# Patient Record
Sex: FEMALE | Race: WHITE | NOT HISPANIC OR LATINO | Employment: FULL TIME | URBAN - METROPOLITAN AREA
[De-identification: names, ages, dates, MRNs, and addresses within clinical notes are randomized per-mention and may not be internally consistent; named-entity substitution may affect disease eponyms.]

---

## 2017-02-03 ENCOUNTER — TRANSCRIBE ORDERS (OUTPATIENT)
Dept: LAB | Facility: CLINIC | Age: 27
End: 2017-02-03

## 2017-02-03 ENCOUNTER — APPOINTMENT (OUTPATIENT)
Dept: LAB | Facility: CLINIC | Age: 27
End: 2017-02-03
Payer: COMMERCIAL

## 2017-02-03 DIAGNOSIS — M79.7 RHEUMATISM, UNSPECIFIED AND FIBROSITIS: ICD-10-CM

## 2017-02-03 DIAGNOSIS — M79.0 RHEUMATISM, UNSPECIFIED AND FIBROSITIS: ICD-10-CM

## 2017-02-03 DIAGNOSIS — M06.4 UNSPECIFIED INFLAMMATORY POLYARTHROPATHY: ICD-10-CM

## 2017-02-03 DIAGNOSIS — R10.9 ABDOMINAL PAIN, UNSPECIFIED SITE: ICD-10-CM

## 2017-02-03 DIAGNOSIS — M54.5 LOW BACK PAIN, UNSPECIFIED BACK PAIN LATERALITY, UNSPECIFIED CHRONICITY, WITH SCIATICA PRESENCE UNSPECIFIED: Primary | ICD-10-CM

## 2017-02-03 DIAGNOSIS — M54.5 LOW BACK PAIN, UNSPECIFIED BACK PAIN LATERALITY, UNSPECIFIED CHRONICITY, WITH SCIATICA PRESENCE UNSPECIFIED: ICD-10-CM

## 2017-02-03 LAB
ALBUMIN SERPL BCP-MCNC: 4.5 G/DL (ref 3.5–5)
ALP SERPL-CCNC: 72 U/L (ref 46–116)
ALT SERPL W P-5'-P-CCNC: 44 U/L (ref 12–78)
ANION GAP SERPL CALCULATED.3IONS-SCNC: 8 MMOL/L (ref 4–13)
AST SERPL W P-5'-P-CCNC: 25 U/L (ref 5–45)
BASOPHILS # BLD AUTO: 0.01 THOUSANDS/ΜL (ref 0–0.1)
BASOPHILS NFR BLD AUTO: 0 % (ref 0–1)
BILIRUB SERPL-MCNC: 0.3 MG/DL (ref 0.2–1)
BUN SERPL-MCNC: 11 MG/DL (ref 5–25)
CALCIUM SERPL-MCNC: 9.4 MG/DL (ref 8.3–10.1)
CHLORIDE SERPL-SCNC: 102 MMOL/L (ref 100–108)
CO2 SERPL-SCNC: 27 MMOL/L (ref 21–32)
CREAT SERPL-MCNC: 0.6 MG/DL (ref 0.6–1.3)
CRP SERPL QL: 35.3 MG/L
EOSINOPHIL # BLD AUTO: 0.03 THOUSAND/ΜL (ref 0–0.61)
EOSINOPHIL NFR BLD AUTO: 1 % (ref 0–6)
ERYTHROCYTE [DISTWIDTH] IN BLOOD BY AUTOMATED COUNT: 13.3 % (ref 11.6–15.1)
ERYTHROCYTE [SEDIMENTATION RATE] IN BLOOD: 3 MM/HOUR (ref 0–20)
GFR SERPL CREATININE-BSD FRML MDRD: >60 ML/MIN/1.73SQ M
GLUCOSE SERPL-MCNC: 103 MG/DL (ref 65–140)
HCT VFR BLD AUTO: 43.3 % (ref 34.8–46.1)
HGB BLD-MCNC: 14.4 G/DL (ref 11.5–15.4)
LYMPHOCYTES # BLD AUTO: 1.43 THOUSANDS/ΜL (ref 0.6–4.47)
LYMPHOCYTES NFR BLD AUTO: 23 % (ref 14–44)
MCH RBC QN AUTO: 28.1 PG (ref 26.8–34.3)
MCHC RBC AUTO-ENTMCNC: 33.3 G/DL (ref 31.4–37.4)
MCV RBC AUTO: 84 FL (ref 82–98)
MONOCYTES # BLD AUTO: 0.59 THOUSAND/ΜL (ref 0.17–1.22)
MONOCYTES NFR BLD AUTO: 10 % (ref 4–12)
NEUTROPHILS # BLD AUTO: 4.08 THOUSANDS/ΜL (ref 1.85–7.62)
NEUTS SEG NFR BLD AUTO: 66 % (ref 43–75)
PLATELET # BLD AUTO: 237 THOUSANDS/UL (ref 149–390)
PMV BLD AUTO: 11.5 FL (ref 8.9–12.7)
POTASSIUM SERPL-SCNC: 3.7 MMOL/L (ref 3.5–5.3)
PROT SERPL-MCNC: 8 G/DL (ref 6.4–8.2)
RBC # BLD AUTO: 5.13 MILLION/UL (ref 3.81–5.12)
SODIUM SERPL-SCNC: 137 MMOL/L (ref 136–145)
WBC # BLD AUTO: 6.14 THOUSAND/UL (ref 4.31–10.16)

## 2017-02-03 PROCEDURE — 85652 RBC SED RATE AUTOMATED: CPT

## 2017-02-03 PROCEDURE — 36415 COLL VENOUS BLD VENIPUNCTURE: CPT

## 2017-02-03 PROCEDURE — 80053 COMPREHEN METABOLIC PANEL: CPT

## 2017-02-03 PROCEDURE — 86140 C-REACTIVE PROTEIN: CPT

## 2017-02-03 PROCEDURE — 85025 COMPLETE CBC W/AUTO DIFF WBC: CPT

## 2017-02-25 ENCOUNTER — APPOINTMENT (OUTPATIENT)
Dept: LAB | Facility: CLINIC | Age: 27
End: 2017-02-25
Payer: COMMERCIAL

## 2017-02-25 DIAGNOSIS — M06.4 UNSPECIFIED INFLAMMATORY POLYARTHROPATHY: ICD-10-CM

## 2017-02-25 DIAGNOSIS — R10.9 ABDOMINAL PAIN, UNSPECIFIED SITE: ICD-10-CM

## 2017-02-25 DIAGNOSIS — M79.0 RHEUMATISM, UNSPECIFIED AND FIBROSITIS: ICD-10-CM

## 2017-02-25 DIAGNOSIS — M79.7 RHEUMATISM, UNSPECIFIED AND FIBROSITIS: ICD-10-CM

## 2017-02-25 DIAGNOSIS — M54.5 LOW BACK PAIN, UNSPECIFIED BACK PAIN LATERALITY, UNSPECIFIED CHRONICITY, WITH SCIATICA PRESENCE UNSPECIFIED: ICD-10-CM

## 2017-02-25 LAB — CRP SERPL QL: <3 MG/L

## 2017-02-25 PROCEDURE — 86140 C-REACTIVE PROTEIN: CPT

## 2017-02-25 PROCEDURE — 36415 COLL VENOUS BLD VENIPUNCTURE: CPT

## 2017-03-06 ENCOUNTER — ALLSCRIPTS OFFICE VISIT (OUTPATIENT)
Dept: OTHER | Facility: OTHER | Age: 27
End: 2017-03-06

## 2017-03-09 ENCOUNTER — TRANSCRIBE ORDERS (OUTPATIENT)
Dept: ADMINISTRATIVE | Facility: HOSPITAL | Age: 27
End: 2017-03-09

## 2017-03-09 DIAGNOSIS — R20.2 PARESTHESIA: Primary | ICD-10-CM

## 2017-04-24 ENCOUNTER — ALLSCRIPTS OFFICE VISIT (OUTPATIENT)
Dept: OTHER | Facility: OTHER | Age: 27
End: 2017-04-24

## 2017-04-28 ENCOUNTER — ALLSCRIPTS OFFICE VISIT (OUTPATIENT)
Dept: OTHER | Facility: OTHER | Age: 27
End: 2017-04-28

## 2017-04-28 DIAGNOSIS — F32.1 MAJOR DEPRESSIVE DISORDER, SINGLE EPISODE, MODERATE (HCC): ICD-10-CM

## 2017-05-01 ENCOUNTER — GENERIC CONVERSION - ENCOUNTER (OUTPATIENT)
Dept: OTHER | Facility: OTHER | Age: 27
End: 2017-05-01

## 2017-05-03 ENCOUNTER — GENERIC CONVERSION - ENCOUNTER (OUTPATIENT)
Dept: OTHER | Facility: OTHER | Age: 27
End: 2017-05-03

## 2017-05-03 ENCOUNTER — TRANSCRIBE ORDERS (OUTPATIENT)
Dept: LAB | Facility: CLINIC | Age: 27
End: 2017-05-03

## 2017-05-03 ENCOUNTER — APPOINTMENT (OUTPATIENT)
Dept: LAB | Facility: CLINIC | Age: 27
End: 2017-05-03
Payer: COMMERCIAL

## 2017-05-03 ENCOUNTER — TRANSCRIBE ORDERS (OUTPATIENT)
Dept: ADMINISTRATIVE | Facility: HOSPITAL | Age: 27
End: 2017-05-03

## 2017-05-03 DIAGNOSIS — M79.0 RHEUMATISM, UNSPECIFIED AND FIBROSITIS: ICD-10-CM

## 2017-05-03 DIAGNOSIS — M89.8X1 PAIN OF RIGHT SCAPULA: Primary | ICD-10-CM

## 2017-05-03 DIAGNOSIS — M06.4 INFLAMMATORY POLYARTHROPATHY (HCC): Primary | ICD-10-CM

## 2017-05-03 DIAGNOSIS — F32.1 MAJOR DEPRESSIVE DISORDER, SINGLE EPISODE, MODERATE (HCC): ICD-10-CM

## 2017-05-03 DIAGNOSIS — M79.7 RHEUMATISM, UNSPECIFIED AND FIBROSITIS: ICD-10-CM

## 2017-05-03 DIAGNOSIS — M06.4 INFLAMMATORY POLYARTHROPATHY (HCC): ICD-10-CM

## 2017-05-03 DIAGNOSIS — R10.0 ACUTE ABDOMINAL PAIN SYNDROME: ICD-10-CM

## 2017-05-03 DIAGNOSIS — M54.5 LOW BACK PAIN, UNSPECIFIED BACK PAIN LATERALITY, UNSPECIFIED CHRONICITY, WITH SCIATICA PRESENCE UNSPECIFIED: ICD-10-CM

## 2017-05-03 LAB
ALBUMIN SERPL BCP-MCNC: 4 G/DL (ref 3.5–5)
ALP SERPL-CCNC: 61 U/L (ref 46–116)
ALT SERPL W P-5'-P-CCNC: 22 U/L (ref 12–78)
ANION GAP SERPL CALCULATED.3IONS-SCNC: 8 MMOL/L (ref 4–13)
AST SERPL W P-5'-P-CCNC: 15 U/L (ref 5–45)
BASOPHILS # BLD AUTO: 0.03 THOUSANDS/ΜL (ref 0–0.1)
BASOPHILS NFR BLD AUTO: 0 % (ref 0–1)
BILIRUB SERPL-MCNC: 0.3 MG/DL (ref 0.2–1)
BUN SERPL-MCNC: 10 MG/DL (ref 5–25)
CALCIUM SERPL-MCNC: 8.8 MG/DL (ref 8.3–10.1)
CHLORIDE SERPL-SCNC: 103 MMOL/L (ref 100–108)
CO2 SERPL-SCNC: 28 MMOL/L (ref 21–32)
CREAT SERPL-MCNC: 0.53 MG/DL (ref 0.6–1.3)
CRP SERPL QL: <3 MG/L
EOSINOPHIL # BLD AUTO: 0.04 THOUSAND/ΜL (ref 0–0.61)
EOSINOPHIL NFR BLD AUTO: 0 % (ref 0–6)
ERYTHROCYTE [DISTWIDTH] IN BLOOD BY AUTOMATED COUNT: 14.3 % (ref 11.6–15.1)
ERYTHROCYTE [SEDIMENTATION RATE] IN BLOOD: 1 MM/HOUR (ref 0–20)
GFR SERPL CREATININE-BSD FRML MDRD: >60 ML/MIN/1.73SQ M
GLUCOSE P FAST SERPL-MCNC: 90 MG/DL (ref 65–99)
HCT VFR BLD AUTO: 44.2 % (ref 34.8–46.1)
HGB BLD-MCNC: 14.6 G/DL (ref 11.5–15.4)
LYMPHOCYTES # BLD AUTO: 3.75 THOUSANDS/ΜL (ref 0.6–4.47)
LYMPHOCYTES NFR BLD AUTO: 28 % (ref 14–44)
MCH RBC QN AUTO: 28 PG (ref 26.8–34.3)
MCHC RBC AUTO-ENTMCNC: 33 G/DL (ref 31.4–37.4)
MCV RBC AUTO: 85 FL (ref 82–98)
MONOCYTES # BLD AUTO: 1.01 THOUSAND/ΜL (ref 0.17–1.22)
MONOCYTES NFR BLD AUTO: 8 % (ref 4–12)
NEUTROPHILS # BLD AUTO: 8.39 THOUSANDS/ΜL (ref 1.85–7.62)
NEUTS SEG NFR BLD AUTO: 64 % (ref 43–75)
PLATELET # BLD AUTO: 308 THOUSANDS/UL (ref 149–390)
PMV BLD AUTO: 11.6 FL (ref 8.9–12.7)
POTASSIUM SERPL-SCNC: 4.3 MMOL/L (ref 3.5–5.3)
PROT SERPL-MCNC: 7.3 G/DL (ref 6.4–8.2)
RBC # BLD AUTO: 5.22 MILLION/UL (ref 3.81–5.12)
SODIUM SERPL-SCNC: 139 MMOL/L (ref 136–145)
TSH SERPL DL<=0.05 MIU/L-ACNC: 1.17 UIU/ML (ref 0.36–3.74)
WBC # BLD AUTO: 13.22 THOUSAND/UL (ref 4.31–10.16)

## 2017-05-03 PROCEDURE — 36415 COLL VENOUS BLD VENIPUNCTURE: CPT

## 2017-05-03 PROCEDURE — 85652 RBC SED RATE AUTOMATED: CPT

## 2017-05-03 PROCEDURE — 80053 COMPREHEN METABOLIC PANEL: CPT

## 2017-05-03 PROCEDURE — 85025 COMPLETE CBC W/AUTO DIFF WBC: CPT

## 2017-05-03 PROCEDURE — 84443 ASSAY THYROID STIM HORMONE: CPT

## 2017-05-03 PROCEDURE — 86140 C-REACTIVE PROTEIN: CPT

## 2017-05-18 ENCOUNTER — HOSPITAL ENCOUNTER (OUTPATIENT)
Dept: NUCLEAR MEDICINE | Facility: HOSPITAL | Age: 27
Discharge: HOME | End: 2017-05-18
Attending: PHYSICAL MEDICINE & REHABILITATION
Payer: OTHER MISCELLANEOUS

## 2017-05-18 DIAGNOSIS — M89.8X1 PAIN OF RIGHT SCAPULA: ICD-10-CM

## 2017-05-18 PROCEDURE — A9503 TC99M MEDRONATE: HCPCS

## 2017-05-18 PROCEDURE — 78320 HB BONE IMAGING (3D): CPT

## 2017-05-23 ENCOUNTER — GENERIC CONVERSION - ENCOUNTER (OUTPATIENT)
Dept: OTHER | Facility: OTHER | Age: 27
End: 2017-05-23

## 2017-05-30 ENCOUNTER — ALLSCRIPTS OFFICE VISIT (OUTPATIENT)
Dept: OTHER | Facility: OTHER | Age: 27
End: 2017-05-30

## 2017-06-13 ENCOUNTER — ALLSCRIPTS OFFICE VISIT (OUTPATIENT)
Dept: OTHER | Facility: OTHER | Age: 27
End: 2017-06-13

## 2017-06-20 ENCOUNTER — GENERIC CONVERSION - ENCOUNTER (OUTPATIENT)
Dept: OTHER | Facility: OTHER | Age: 27
End: 2017-06-20

## 2017-06-20 ENCOUNTER — TRANSCRIBE ORDERS (OUTPATIENT)
Dept: ADMINISTRATIVE | Facility: HOSPITAL | Age: 27
End: 2017-06-20

## 2017-06-20 DIAGNOSIS — M06.4 INFLAMMATORY POLYARTHRITIS (HCC): Primary | ICD-10-CM

## 2017-06-21 ENCOUNTER — HOSPITAL ENCOUNTER (OUTPATIENT)
Dept: RADIOLOGY | Facility: HOSPITAL | Age: 27
Discharge: HOME | End: 2017-06-21
Payer: COMMERCIAL

## 2017-06-21 ENCOUNTER — TRANSCRIBE ORDERS (OUTPATIENT)
Dept: ADMINISTRATIVE | Facility: HOSPITAL | Age: 27
End: 2017-06-21

## 2017-06-21 DIAGNOSIS — M79.7 FIBROMYALGIA: ICD-10-CM

## 2017-06-21 DIAGNOSIS — M06.4 INFLAMMATORY POLYARTHROPATHY (HCC): Primary | ICD-10-CM

## 2017-06-21 PROCEDURE — 73130 X-RAY EXAM OF HAND: CPT

## 2017-06-21 PROCEDURE — 73110 X-RAY EXAM OF WRIST: CPT

## 2017-06-21 PROCEDURE — 73630 X-RAY EXAM OF FOOT: CPT

## 2017-06-23 ENCOUNTER — HOSPITAL ENCOUNTER (OUTPATIENT)
Dept: ULTRASOUND IMAGING | Facility: HOSPITAL | Age: 27
Discharge: HOME | End: 2017-06-23
Payer: COMMERCIAL

## 2017-06-23 DIAGNOSIS — M06.4 INFLAMMATORY POLYARTHRITIS (HCC): ICD-10-CM

## 2017-06-23 PROCEDURE — 76536 US EXAM OF HEAD AND NECK: CPT

## 2017-07-05 ENCOUNTER — APPOINTMENT (OUTPATIENT)
Dept: LAB | Facility: CLINIC | Age: 27
End: 2017-07-05
Payer: COMMERCIAL

## 2017-07-05 DIAGNOSIS — M06.4 INFLAMMATORY POLYARTHROPATHY (HCC): ICD-10-CM

## 2017-07-05 LAB
BASOPHILS # BLD AUTO: 0.02 THOUSANDS/ΜL (ref 0–0.1)
BASOPHILS NFR BLD AUTO: 0 % (ref 0–1)
C3 SERPL-MCNC: 100 MG/DL (ref 90–180)
C4 SERPL-MCNC: 25 MG/DL (ref 10–40)
CRP SERPL QL: 3.5 MG/L
EOSINOPHIL # BLD AUTO: 0.07 THOUSAND/ΜL (ref 0–0.61)
EOSINOPHIL NFR BLD AUTO: 1 % (ref 0–6)
ERYTHROCYTE [DISTWIDTH] IN BLOOD BY AUTOMATED COUNT: 13 % (ref 11.6–15.1)
ERYTHROCYTE [SEDIMENTATION RATE] IN BLOOD: 7 MM/HOUR (ref 0–20)
HCT VFR BLD AUTO: 38.5 % (ref 34.8–46.1)
HGB BLD-MCNC: 12.9 G/DL (ref 11.5–15.4)
LYMPHOCYTES # BLD AUTO: 2.7 THOUSANDS/ΜL (ref 0.6–4.47)
LYMPHOCYTES NFR BLD AUTO: 31 % (ref 14–44)
MCH RBC QN AUTO: 28.4 PG (ref 26.8–34.3)
MCHC RBC AUTO-ENTMCNC: 33.5 G/DL (ref 31.4–37.4)
MCV RBC AUTO: 85 FL (ref 82–98)
MONOCYTES # BLD AUTO: 0.51 THOUSAND/ΜL (ref 0.17–1.22)
MONOCYTES NFR BLD AUTO: 6 % (ref 4–12)
NEUTROPHILS # BLD AUTO: 5.45 THOUSANDS/ΜL (ref 1.85–7.62)
NEUTS SEG NFR BLD AUTO: 62 % (ref 43–75)
PLATELET # BLD AUTO: 232 THOUSANDS/UL (ref 149–390)
PMV BLD AUTO: 11.6 FL (ref 8.9–12.7)
RBC # BLD AUTO: 4.54 MILLION/UL (ref 3.81–5.12)
T4 FREE SERPL-MCNC: 0.81 NG/DL (ref 0.76–1.46)
TSH SERPL DL<=0.05 MIU/L-ACNC: 1.34 UIU/ML (ref 0.36–3.74)
WBC # BLD AUTO: 8.75 THOUSAND/UL (ref 4.31–10.16)

## 2017-07-05 PROCEDURE — 86200 CCP ANTIBODY: CPT

## 2017-07-05 PROCEDURE — 84443 ASSAY THYROID STIM HORMONE: CPT

## 2017-07-05 PROCEDURE — 86225 DNA ANTIBODY NATIVE: CPT

## 2017-07-05 PROCEDURE — 80074 ACUTE HEPATITIS PANEL: CPT

## 2017-07-05 PROCEDURE — 86235 NUCLEAR ANTIGEN ANTIBODY: CPT

## 2017-07-05 PROCEDURE — 85025 COMPLETE CBC W/AUTO DIFF WBC: CPT

## 2017-07-05 PROCEDURE — 36415 COLL VENOUS BLD VENIPUNCTURE: CPT

## 2017-07-05 PROCEDURE — 86038 ANTINUCLEAR ANTIBODIES: CPT

## 2017-07-05 PROCEDURE — 86376 MICROSOMAL ANTIBODY EACH: CPT

## 2017-07-05 PROCEDURE — 81374 HLA I TYPING 1 ANTIGEN LR: CPT

## 2017-07-05 PROCEDURE — 86430 RHEUMATOID FACTOR TEST QUAL: CPT

## 2017-07-05 PROCEDURE — 86160 COMPLEMENT ANTIGEN: CPT

## 2017-07-05 PROCEDURE — 85652 RBC SED RATE AUTOMATED: CPT

## 2017-07-05 PROCEDURE — 86140 C-REACTIVE PROTEIN: CPT

## 2017-07-05 PROCEDURE — 86800 THYROGLOBULIN ANTIBODY: CPT

## 2017-07-05 PROCEDURE — 84439 ASSAY OF FREE THYROXINE: CPT

## 2017-07-05 PROCEDURE — 82164 ANGIOTENSIN I ENZYME TEST: CPT

## 2017-07-06 LAB
ACE SERPL-CCNC: 34 U/L (ref 14–82)
DSDNA AB SER-ACNC: <1 IU/ML (ref 0–9)
ENA RNP AB SER-ACNC: <0.2 AI (ref 0–0.9)
ENA SM AB SER-ACNC: <0.2 AI (ref 0–0.9)
ENA SS-A AB SER-ACNC: <0.2 AI (ref 0–0.9)
ENA SS-B AB SER-ACNC: <0.2 AI (ref 0–0.9)
HAV IGM SER QL: NORMAL
HBV CORE IGM SER QL: NORMAL
HBV SURFACE AG SER QL: NORMAL
HCV AB SER QL: NORMAL
RHEUMATOID FACT SER QL LA: NEGATIVE
THYROGLOB AB SERPL-ACNC: <1 IU/ML (ref 0–0.9)
THYROPEROXIDASE AB SERPL-ACNC: 14 IU/ML (ref 0–34)

## 2017-07-07 LAB
CCP IGA+IGG SERPL IA-ACNC: 9 UNITS (ref 0–19)
RYE IGE QN: NEGATIVE

## 2017-07-11 LAB — HLA-B27 QL NAA+PROBE: NEGATIVE

## 2017-07-20 ENCOUNTER — ALLSCRIPTS OFFICE VISIT (OUTPATIENT)
Dept: OTHER | Facility: OTHER | Age: 27
End: 2017-07-20

## 2017-07-20 DIAGNOSIS — N39.3 STRESS INCONTINENCE: ICD-10-CM

## 2017-07-20 DIAGNOSIS — M54.12 RADICULOPATHY OF CERVICAL REGION: ICD-10-CM

## 2017-07-20 DIAGNOSIS — N83.201 CYST OF RIGHT OVARY: ICD-10-CM

## 2017-07-20 DIAGNOSIS — R10.9 ABDOMINAL PAIN: ICD-10-CM

## 2017-07-20 DIAGNOSIS — R20.2 PARESTHESIA OF SKIN: ICD-10-CM

## 2017-07-24 ENCOUNTER — ALLSCRIPTS OFFICE VISIT (OUTPATIENT)
Dept: OTHER | Facility: OTHER | Age: 27
End: 2017-07-24

## 2017-07-24 ENCOUNTER — TRANSCRIBE ORDERS (OUTPATIENT)
Dept: ADMINISTRATIVE | Facility: HOSPITAL | Age: 27
End: 2017-07-24

## 2017-07-24 DIAGNOSIS — M54.12 BRACHIAL NEURITIS OR RADICULITIS NOS: Primary | ICD-10-CM

## 2017-07-31 ENCOUNTER — TRANSCRIBE ORDERS (OUTPATIENT)
Dept: LAB | Facility: CLINIC | Age: 27
End: 2017-07-31

## 2017-07-31 ENCOUNTER — APPOINTMENT (OUTPATIENT)
Dept: LAB | Facility: CLINIC | Age: 27
End: 2017-07-31
Payer: COMMERCIAL

## 2017-07-31 DIAGNOSIS — M06.4 INFLAMMATORY POLYARTHROPATHY (HCC): ICD-10-CM

## 2017-07-31 DIAGNOSIS — M06.4 INFLAMMATORY POLYARTHROPATHY (HCC): Primary | ICD-10-CM

## 2017-07-31 LAB
CRP SERPL QL: 3.8 MG/L
ERYTHROCYTE [SEDIMENTATION RATE] IN BLOOD: 3 MM/HOUR (ref 0–20)

## 2017-07-31 PROCEDURE — 85652 RBC SED RATE AUTOMATED: CPT

## 2017-07-31 PROCEDURE — 84165 PROTEIN E-PHORESIS SERUM: CPT

## 2017-07-31 PROCEDURE — 86140 C-REACTIVE PROTEIN: CPT

## 2017-07-31 PROCEDURE — 36415 COLL VENOUS BLD VENIPUNCTURE: CPT

## 2017-08-03 LAB
ALBUMIN SERPL ELPH-MCNC: 4.15 G/DL (ref 3.5–5)
ALBUMIN SERPL ELPH-MCNC: 63.8 % (ref 52–65)
ALPHA1 GLOB SERPL ELPH-MCNC: 0.29 G/DL (ref 0.1–0.4)
ALPHA1 GLOB SERPL ELPH-MCNC: 4.5 % (ref 2.5–5)
ALPHA2 GLOB SERPL ELPH-MCNC: 0.57 G/DL (ref 0.4–1.2)
ALPHA2 GLOB SERPL ELPH-MCNC: 8.8 % (ref 7–13)
BETA GLOB ABNORMAL SERPL ELPH-MCNC: 0.47 G/DL (ref 0.4–0.8)
BETA1 GLOB SERPL ELPH-MCNC: 7.3 % (ref 5–13)
BETA2 GLOB SERPL ELPH-MCNC: 4.9 % (ref 2–8)
BETA2+GAMMA GLOB SERPL ELPH-MCNC: 0.32 G/DL (ref 0.2–0.5)
GAMMA GLOB ABNORMAL SERPL ELPH-MCNC: 0.7 G/DL (ref 0.5–1.6)
GAMMA GLOB SERPL ELPH-MCNC: 10.7 % (ref 12–22)
IGG/ALB SER: 1.76 {RATIO} (ref 1.1–1.8)
PROT PATTERN SERPL ELPH-IMP: ABNORMAL
PROT SERPL-MCNC: 6.5 G/DL (ref 6.4–8.2)

## 2017-08-09 ENCOUNTER — HOSPITAL ENCOUNTER (OUTPATIENT)
Dept: MRI IMAGING | Facility: HOSPITAL | Age: 27
Discharge: HOME | End: 2017-08-09
Attending: PSYCHIATRY & NEUROLOGY
Payer: OTHER MISCELLANEOUS

## 2017-08-09 DIAGNOSIS — M54.12 BRACHIAL NEURITIS OR RADICULITIS NOS: ICD-10-CM

## 2017-08-09 PROCEDURE — 72141 MRI NECK SPINE W/O DYE: CPT

## 2017-08-10 ENCOUNTER — HOSPITAL ENCOUNTER (EMERGENCY)
Facility: HOSPITAL | Age: 27
Discharge: HOME | End: 2017-08-11
Attending: EMERGENCY MEDICINE | Admitting: EMERGENCY MEDICINE
Payer: COMMERCIAL

## 2017-08-10 ENCOUNTER — APPOINTMENT (OUTPATIENT)
Dept: LAB | Facility: CLINIC | Age: 27
End: 2017-08-10
Payer: COMMERCIAL

## 2017-08-10 ENCOUNTER — ALLSCRIPTS OFFICE VISIT (OUTPATIENT)
Dept: OTHER | Facility: OTHER | Age: 27
End: 2017-08-10

## 2017-08-10 ENCOUNTER — TRANSCRIBE ORDERS (OUTPATIENT)
Dept: ADMINISTRATIVE | Facility: HOSPITAL | Age: 27
End: 2017-08-10

## 2017-08-10 DIAGNOSIS — R10.9 ABDOMINAL PAIN: ICD-10-CM

## 2017-08-10 DIAGNOSIS — N83.209 RUPTURED OVARIAN CYST: Primary | ICD-10-CM

## 2017-08-10 DIAGNOSIS — R10.31 RIGHT LOWER QUADRANT PAIN: Primary | ICD-10-CM

## 2017-08-10 DIAGNOSIS — R19.7 ACUTE DIARRHEA: ICD-10-CM

## 2017-08-10 LAB
ALBUMIN SERPL BCP-MCNC: 3.7 G/DL (ref 3.5–5)
ALP SERPL-CCNC: 60 U/L (ref 46–116)
ALT SERPL W P-5'-P-CCNC: 31 U/L (ref 12–78)
ANION GAP SERPL CALCULATED.3IONS-SCNC: 7 MMOL/L (ref 4–13)
AST SERPL W P-5'-P-CCNC: 20 U/L (ref 5–45)
BACTERIA UR QL AUTO: ABNORMAL /HPF
BASOPHILS # BLD AUTO: 0.02 THOUSANDS/ΜL (ref 0–0.1)
BASOPHILS NFR BLD AUTO: 0 % (ref 0–1)
BILIRUB SERPL-MCNC: 0.3 MG/DL (ref 0.2–1)
BILIRUB UR QL STRIP: NEGATIVE
BUN SERPL-MCNC: 12 MG/DL (ref 5–25)
CALCIUM SERPL-MCNC: 8.6 MG/DL (ref 8.3–10.1)
CHLORIDE SERPL-SCNC: 105 MMOL/L (ref 100–108)
CLARITY UR: CLEAR
CO2 SERPL-SCNC: 28 MMOL/L (ref 21–32)
COLOR UR: YELLOW
CREAT SERPL-MCNC: 0.57 MG/DL (ref 0.6–1.3)
EOSINOPHIL # BLD AUTO: 0.06 THOUSAND/ΜL (ref 0–0.61)
EOSINOPHIL NFR BLD AUTO: 1 % (ref 0–6)
ERYTHROCYTE [DISTWIDTH] IN BLOOD BY AUTOMATED COUNT: 12.9 % (ref 11.6–15.1)
GFR SERPL CREATININE-BSD FRML MDRD: 128 ML/MIN/1.73SQ M
GLUCOSE SERPL-MCNC: 96 MG/DL (ref 65–140)
GLUCOSE UR STRIP-MCNC: NEGATIVE MG/DL
HCG UR QL: NEGATIVE
HCT VFR BLD AUTO: 35.9 % (ref 34.8–46.1)
HGB BLD-MCNC: 11.8 G/DL (ref 11.5–15.4)
HGB UR QL STRIP.AUTO: ABNORMAL
KETONES UR STRIP-MCNC: NEGATIVE MG/DL
LEUKOCYTE ESTERASE UR QL STRIP: NEGATIVE
LYMPHOCYTES # BLD AUTO: 1.57 THOUSANDS/ΜL (ref 0.6–4.47)
LYMPHOCYTES NFR BLD AUTO: 22 % (ref 14–44)
MCH RBC QN AUTO: 28 PG (ref 26.8–34.3)
MCHC RBC AUTO-ENTMCNC: 32.9 G/DL (ref 31.4–37.4)
MCV RBC AUTO: 85 FL (ref 82–98)
MONOCYTES # BLD AUTO: 0.64 THOUSAND/ΜL (ref 0.17–1.22)
MONOCYTES NFR BLD AUTO: 9 % (ref 4–12)
NEUTROPHILS # BLD AUTO: 4.99 THOUSANDS/ΜL (ref 1.85–7.62)
NEUTS SEG NFR BLD AUTO: 68 % (ref 43–75)
NITRITE UR QL STRIP: NEGATIVE
NON-SQ EPI CELLS URNS QL MICRO: ABNORMAL /HPF
PH UR STRIP.AUTO: 5.5 [PH] (ref 4.5–8)
PLATELET # BLD AUTO: 215 THOUSANDS/UL (ref 149–390)
PMV BLD AUTO: 11.2 FL (ref 8.9–12.7)
POTASSIUM SERPL-SCNC: 4 MMOL/L (ref 3.5–5.3)
PROT SERPL-MCNC: 6.9 G/DL (ref 6.4–8.2)
PROT UR STRIP-MCNC: NEGATIVE MG/DL
RBC # BLD AUTO: 4.21 MILLION/UL (ref 3.81–5.12)
RBC #/AREA URNS AUTO: ABNORMAL /HPF
SODIUM SERPL-SCNC: 140 MMOL/L (ref 136–145)
SP GR UR STRIP.AUTO: 1.01 (ref 1–1.03)
UROBILINOGEN UR QL STRIP.AUTO: 0.2 E.U./DL
WBC # BLD AUTO: 7.28 THOUSAND/UL (ref 4.31–10.16)
WBC #/AREA URNS AUTO: ABNORMAL /HPF

## 2017-08-10 PROCEDURE — 80053 COMPREHEN METABOLIC PANEL: CPT

## 2017-08-10 PROCEDURE — 96375 TX/PRO/DX INJ NEW DRUG ADDON: CPT

## 2017-08-10 PROCEDURE — 85025 COMPLETE CBC W/AUTO DIFF WBC: CPT

## 2017-08-10 PROCEDURE — 81001 URINALYSIS AUTO W/SCOPE: CPT

## 2017-08-10 PROCEDURE — 36415 COLL VENOUS BLD VENIPUNCTURE: CPT

## 2017-08-10 PROCEDURE — 96374 THER/PROPH/DIAG INJ IV PUSH: CPT

## 2017-08-10 PROCEDURE — 96361 HYDRATE IV INFUSION ADD-ON: CPT

## 2017-08-10 PROCEDURE — 81025 URINE PREGNANCY TEST: CPT | Performed by: EMERGENCY MEDICINE

## 2017-08-10 RX ORDER — PREGABALIN 150 MG/1
50 CAPSULE ORAL 2 TIMES DAILY
COMMUNITY
End: 2018-06-29 | Stop reason: SDUPTHER

## 2017-08-10 RX ORDER — ONDANSETRON 2 MG/ML
4 INJECTION INTRAMUSCULAR; INTRAVENOUS ONCE
Status: COMPLETED | OUTPATIENT
Start: 2017-08-10 | End: 2017-08-10

## 2017-08-10 RX ORDER — BUPROPION HYDROCHLORIDE 150 MG/1
150 TABLET ORAL DAILY
COMMUNITY
End: 2018-02-12

## 2017-08-10 RX ORDER — MORPHINE SULFATE 4 MG/ML
4 INJECTION, SOLUTION INTRAMUSCULAR; INTRAVENOUS ONCE
Status: COMPLETED | OUTPATIENT
Start: 2017-08-10 | End: 2017-08-10

## 2017-08-10 RX ORDER — ERGOCALCIFEROL 1.25 MG/1
50000 CAPSULE ORAL WEEKLY
COMMUNITY
End: 2019-03-04

## 2017-08-10 RX ADMIN — ONDANSETRON 4 MG: 2 INJECTION INTRAMUSCULAR; INTRAVENOUS at 23:56

## 2017-08-10 RX ADMIN — SODIUM CHLORIDE 1000 ML: 0.9 INJECTION, SOLUTION INTRAVENOUS at 23:56

## 2017-08-10 RX ADMIN — MORPHINE SULFATE 4 MG: 4 INJECTION, SOLUTION INTRAMUSCULAR; INTRAVENOUS at 23:59

## 2017-08-11 ENCOUNTER — APPOINTMENT (EMERGENCY)
Dept: CT IMAGING | Facility: HOSPITAL | Age: 27
End: 2017-08-11
Payer: COMMERCIAL

## 2017-08-11 VITALS
SYSTOLIC BLOOD PRESSURE: 110 MMHG | OXYGEN SATURATION: 99 % | HEART RATE: 76 BPM | WEIGHT: 185 LBS | TEMPERATURE: 97.9 F | BODY MASS INDEX: 31.58 KG/M2 | RESPIRATION RATE: 16 BRPM | DIASTOLIC BLOOD PRESSURE: 78 MMHG | HEIGHT: 64 IN

## 2017-08-11 PROCEDURE — 96361 HYDRATE IV INFUSION ADD-ON: CPT

## 2017-08-11 PROCEDURE — 96375 TX/PRO/DX INJ NEW DRUG ADDON: CPT

## 2017-08-11 PROCEDURE — 74177 CT ABD & PELVIS W/CONTRAST: CPT

## 2017-08-11 PROCEDURE — 99284 EMERGENCY DEPT VISIT MOD MDM: CPT

## 2017-08-11 RX ORDER — KETOROLAC TROMETHAMINE 30 MG/ML
15 INJECTION, SOLUTION INTRAMUSCULAR; INTRAVENOUS ONCE
Status: COMPLETED | OUTPATIENT
Start: 2017-08-11 | End: 2017-08-11

## 2017-08-11 RX ORDER — IBUPROFEN 800 MG/1
800 TABLET ORAL EVERY 8 HOURS PRN
Qty: 21 TABLET | Refills: 0 | Status: SHIPPED | OUTPATIENT
Start: 2017-08-11 | End: 2018-03-14

## 2017-08-11 RX ORDER — HYDROCODONE BITARTRATE AND ACETAMINOPHEN 5; 325 MG/1; MG/1
1 TABLET ORAL EVERY 6 HOURS PRN
Qty: 15 TABLET | Refills: 0 | Status: SHIPPED | OUTPATIENT
Start: 2017-08-11 | End: 2017-08-21

## 2017-08-11 RX ADMIN — KETOROLAC TROMETHAMINE 15 MG: 30 INJECTION, SOLUTION INTRAMUSCULAR at 01:34

## 2017-08-11 RX ADMIN — IOHEXOL 100 ML: 350 INJECTION, SOLUTION INTRAVENOUS at 00:19

## 2017-08-14 ENCOUNTER — ALLSCRIPTS OFFICE VISIT (OUTPATIENT)
Dept: OTHER | Facility: OTHER | Age: 27
End: 2017-08-14

## 2017-08-17 ENCOUNTER — GENERIC CONVERSION - ENCOUNTER (OUTPATIENT)
Dept: OTHER | Facility: OTHER | Age: 27
End: 2017-08-17

## 2017-08-17 ENCOUNTER — HOSPITAL ENCOUNTER (OUTPATIENT)
Dept: NEUROLOGY | Facility: CLINIC | Age: 27
Discharge: HOME | End: 2017-08-17
Payer: OTHER MISCELLANEOUS

## 2017-08-17 DIAGNOSIS — R20.2 PARESTHESIAS: ICD-10-CM

## 2017-08-17 DIAGNOSIS — M54.12 BRACHIAL NEURITIS OR RADICULITIS NOS: ICD-10-CM

## 2017-08-17 PROCEDURE — 95886 MUSC TEST DONE W/N TEST COMP: CPT

## 2017-08-17 PROCEDURE — 95911 NRV CNDJ TEST 9-10 STUDIES: CPT

## 2017-08-18 ENCOUNTER — HOSPITAL ENCOUNTER (OUTPATIENT)
Dept: ULTRASOUND IMAGING | Facility: HOSPITAL | Age: 27
Discharge: HOME | End: 2017-08-18
Attending: OBSTETRICS & GYNECOLOGY
Payer: COMMERCIAL

## 2017-08-18 DIAGNOSIS — N83.201 CYST OF RIGHT OVARY: ICD-10-CM

## 2017-08-18 PROCEDURE — 76856 US EXAM PELVIC COMPLETE: CPT

## 2017-08-18 PROCEDURE — 76830 TRANSVAGINAL US NON-OB: CPT

## 2017-08-22 ENCOUNTER — APPOINTMENT (OUTPATIENT)
Dept: PHYSICAL THERAPY | Facility: CLINIC | Age: 27
End: 2017-08-22
Payer: OTHER MISCELLANEOUS

## 2017-08-22 DIAGNOSIS — R20.2 PARESTHESIA OF SKIN: ICD-10-CM

## 2017-08-22 DIAGNOSIS — M54.12 RADICULOPATHY OF CERVICAL REGION: ICD-10-CM

## 2017-08-22 PROCEDURE — 97110 THERAPEUTIC EXERCISES: CPT

## 2017-08-22 PROCEDURE — 97162 PT EVAL MOD COMPLEX 30 MIN: CPT

## 2017-08-23 ENCOUNTER — GENERIC CONVERSION - ENCOUNTER (OUTPATIENT)
Dept: OTHER | Facility: OTHER | Age: 27
End: 2017-08-23

## 2017-08-24 ENCOUNTER — ALLSCRIPTS OFFICE VISIT (OUTPATIENT)
Dept: OTHER | Facility: OTHER | Age: 27
End: 2017-08-24

## 2017-08-29 ENCOUNTER — APPOINTMENT (OUTPATIENT)
Dept: PHYSICAL THERAPY | Facility: CLINIC | Age: 27
End: 2017-08-29
Payer: OTHER MISCELLANEOUS

## 2017-08-29 PROCEDURE — 97010 HOT OR COLD PACKS THERAPY: CPT

## 2017-08-29 PROCEDURE — 97110 THERAPEUTIC EXERCISES: CPT

## 2017-08-30 ENCOUNTER — GENERIC CONVERSION - ENCOUNTER (OUTPATIENT)
Dept: OTHER | Facility: OTHER | Age: 27
End: 2017-08-30

## 2017-09-01 DIAGNOSIS — R31.29 OTHER MICROSCOPIC HEMATURIA: ICD-10-CM

## 2017-09-12 ENCOUNTER — APPOINTMENT (OUTPATIENT)
Dept: PHYSICAL THERAPY | Facility: CLINIC | Age: 27
End: 2017-09-12
Payer: OTHER MISCELLANEOUS

## 2017-09-12 PROCEDURE — 97110 THERAPEUTIC EXERCISES: CPT

## 2017-09-12 PROCEDURE — 97010 HOT OR COLD PACKS THERAPY: CPT

## 2017-09-19 ENCOUNTER — APPOINTMENT (OUTPATIENT)
Dept: PHYSICAL THERAPY | Facility: CLINIC | Age: 27
End: 2017-09-19
Payer: OTHER MISCELLANEOUS

## 2017-09-19 PROCEDURE — 97010 HOT OR COLD PACKS THERAPY: CPT

## 2017-09-19 PROCEDURE — G8991 OTHER PT/OT GOAL STATUS: HCPCS

## 2017-09-19 PROCEDURE — 97014 ELECTRIC STIMULATION THERAPY: CPT

## 2017-09-19 PROCEDURE — 97110 THERAPEUTIC EXERCISES: CPT

## 2017-09-19 PROCEDURE — G8990 OTHER PT/OT CURRENT STATUS: HCPCS

## 2017-09-20 ENCOUNTER — GENERIC CONVERSION - ENCOUNTER (OUTPATIENT)
Dept: OTHER | Facility: OTHER | Age: 27
End: 2017-09-20

## 2017-09-21 ENCOUNTER — APPOINTMENT (OUTPATIENT)
Dept: PHYSICAL THERAPY | Facility: CLINIC | Age: 27
End: 2017-09-21
Payer: OTHER MISCELLANEOUS

## 2017-09-25 ENCOUNTER — ALLSCRIPTS OFFICE VISIT (OUTPATIENT)
Dept: OTHER | Facility: OTHER | Age: 27
End: 2017-09-25

## 2017-09-26 ENCOUNTER — GENERIC CONVERSION - ENCOUNTER (OUTPATIENT)
Dept: OTHER | Facility: OTHER | Age: 27
End: 2017-09-26

## 2017-09-26 ENCOUNTER — APPOINTMENT (OUTPATIENT)
Dept: PHYSICAL THERAPY | Facility: CLINIC | Age: 27
End: 2017-09-26
Payer: OTHER MISCELLANEOUS

## 2017-09-26 PROCEDURE — 97014 ELECTRIC STIMULATION THERAPY: CPT

## 2017-09-26 PROCEDURE — 97010 HOT OR COLD PACKS THERAPY: CPT

## 2017-09-26 PROCEDURE — 97110 THERAPEUTIC EXERCISES: CPT

## 2017-09-28 ENCOUNTER — APPOINTMENT (OUTPATIENT)
Dept: PHYSICAL THERAPY | Facility: CLINIC | Age: 27
End: 2017-09-28
Payer: OTHER MISCELLANEOUS

## 2017-09-28 PROCEDURE — 97110 THERAPEUTIC EXERCISES: CPT

## 2017-09-29 ENCOUNTER — ALLSCRIPTS OFFICE VISIT (OUTPATIENT)
Dept: OTHER | Facility: OTHER | Age: 27
End: 2017-09-29

## 2017-10-01 ENCOUNTER — GENERIC CONVERSION - ENCOUNTER (OUTPATIENT)
Dept: OTHER | Facility: OTHER | Age: 27
End: 2017-10-01

## 2017-10-02 ENCOUNTER — APPOINTMENT (OUTPATIENT)
Dept: PHYSICAL THERAPY | Facility: CLINIC | Age: 27
End: 2017-10-02
Payer: OTHER MISCELLANEOUS

## 2017-10-02 PROCEDURE — 97110 THERAPEUTIC EXERCISES: CPT

## 2017-10-03 ENCOUNTER — APPOINTMENT (OUTPATIENT)
Dept: PHYSICAL THERAPY | Facility: CLINIC | Age: 27
End: 2017-10-03
Payer: OTHER MISCELLANEOUS

## 2017-10-03 PROCEDURE — 97110 THERAPEUTIC EXERCISES: CPT

## 2017-10-03 PROCEDURE — 97010 HOT OR COLD PACKS THERAPY: CPT

## 2017-10-09 ENCOUNTER — ALLSCRIPTS OFFICE VISIT (OUTPATIENT)
Dept: OTHER | Facility: OTHER | Age: 27
End: 2017-10-09

## 2017-10-09 ENCOUNTER — APPOINTMENT (OUTPATIENT)
Dept: PHYSICAL THERAPY | Facility: CLINIC | Age: 27
End: 2017-10-09
Payer: OTHER MISCELLANEOUS

## 2017-10-09 ENCOUNTER — GENERIC CONVERSION - ENCOUNTER (OUTPATIENT)
Dept: OTHER | Facility: OTHER | Age: 27
End: 2017-10-09

## 2017-10-09 PROCEDURE — 97110 THERAPEUTIC EXERCISES: CPT

## 2017-10-10 ENCOUNTER — APPOINTMENT (OUTPATIENT)
Dept: PHYSICAL THERAPY | Facility: CLINIC | Age: 27
End: 2017-10-10
Payer: OTHER MISCELLANEOUS

## 2017-10-10 ENCOUNTER — GENERIC CONVERSION - ENCOUNTER (OUTPATIENT)
Dept: OTHER | Facility: OTHER | Age: 27
End: 2017-10-10

## 2017-10-10 PROCEDURE — 97110 THERAPEUTIC EXERCISES: CPT

## 2017-10-16 ENCOUNTER — APPOINTMENT (OUTPATIENT)
Dept: PHYSICAL THERAPY | Facility: CLINIC | Age: 27
End: 2017-10-16
Payer: OTHER MISCELLANEOUS

## 2017-10-16 PROCEDURE — 97110 THERAPEUTIC EXERCISES: CPT

## 2017-10-17 ENCOUNTER — APPOINTMENT (OUTPATIENT)
Dept: PHYSICAL THERAPY | Facility: CLINIC | Age: 27
End: 2017-10-17
Payer: OTHER MISCELLANEOUS

## 2017-10-17 PROCEDURE — 97110 THERAPEUTIC EXERCISES: CPT

## 2017-10-17 PROCEDURE — G8992 OTHER PT/OT  D/C STATUS: HCPCS

## 2017-10-17 PROCEDURE — G8991 OTHER PT/OT GOAL STATUS: HCPCS

## 2017-10-18 ENCOUNTER — GENERIC CONVERSION - ENCOUNTER (OUTPATIENT)
Dept: OTHER | Facility: OTHER | Age: 27
End: 2017-10-18

## 2017-10-23 ENCOUNTER — ALLSCRIPTS OFFICE VISIT (OUTPATIENT)
Dept: OTHER | Facility: OTHER | Age: 27
End: 2017-10-23

## 2017-10-24 ENCOUNTER — GENERIC CONVERSION - ENCOUNTER (OUTPATIENT)
Dept: OTHER | Facility: OTHER | Age: 27
End: 2017-10-24

## 2017-10-24 ENCOUNTER — APPOINTMENT (OUTPATIENT)
Dept: PHYSICAL THERAPY | Facility: CLINIC | Age: 27
End: 2017-10-24
Payer: OTHER MISCELLANEOUS

## 2017-10-26 ENCOUNTER — GENERIC CONVERSION - ENCOUNTER (OUTPATIENT)
Dept: OTHER | Facility: OTHER | Age: 27
End: 2017-10-26
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
## 2017-10-30 ENCOUNTER — APPOINTMENT (OUTPATIENT)
Dept: PHYSICAL THERAPY | Facility: CLINIC | Age: 27
End: 2017-10-30
Payer: OTHER MISCELLANEOUS

## 2017-10-31 ENCOUNTER — APPOINTMENT (OUTPATIENT)
Dept: PHYSICAL THERAPY | Facility: CLINIC | Age: 27
End: 2017-10-31
Payer: OTHER MISCELLANEOUS

## 2017-11-03 ENCOUNTER — GENERIC CONVERSION - ENCOUNTER (OUTPATIENT)
Dept: OTHER | Facility: OTHER | Age: 27
End: 2017-11-03

## 2017-11-06 ENCOUNTER — ALLSCRIPTS OFFICE VISIT (OUTPATIENT)
Dept: OTHER | Facility: OTHER | Age: 27
End: 2017-11-06

## 2017-11-20 ENCOUNTER — ALLSCRIPTS OFFICE VISIT (OUTPATIENT)
Dept: OTHER | Facility: OTHER | Age: 27
End: 2017-11-20

## 2017-11-21 ENCOUNTER — ALLSCRIPTS OFFICE VISIT (OUTPATIENT)
Dept: OTHER | Facility: OTHER | Age: 27
End: 2017-11-21

## 2017-11-22 NOTE — PROGRESS NOTES
Assessment    1  Sinus headache (784 0) (R51)   2  Feeling tired (780 79) (R53 83)   3  Moderate depressive episode (296 22) (F32 1)    Plan  Moderate depressive episode    · BuPROPion HCl ER (XL) 150 MG Oral Tablet Extended Release 24 Hour; TAKE 1TABLET BY MOUTH EVERY DAY  Myofascial pain    · From  Lyrica 150 MG Oral Capsule TAKE 1 CAPSULE TWICE DAILY To [id number] MG Oral Capsule TAKE ONE CAPSULE IN MORNING AND TWO CAPSULES INEVENING  Sinus headache    · Azelastine HCl - 0 1 % Nasal Solution; USE 1 SPRAY IN EACH NOSTRIL TWICEDAILY    Discussion/Summary    1  REDUCE BUPROPION TO 150MG ONCE A DAYDRINK MORE FLUIDS THROUGHOUT THE DAYMELATONIN 2-3 MG AT BEDTIME FOR SLEEPRETURN IN 3 WEEKSGO BACK AND SEE NEUROLOGISTLYRICA REFILLED ONLY UNTIL SEEING NEW RHEUMATOLOGISTADD 2-3 CUPS OF FLUIDS PER DAY  The patient was counseled regarding instructions for management,-- patient and family education,-- impressions,-- risks and benefits of treatment options        Provider Comments  Provider Comments:   treat symptoms and get adequate rest, hydration  given should sx not improve -> call PCP or neuro office or on-call provider or if worsening/new sx develop ->go to ER      Chief Complaint  PT COMPLAINS OF SINUS PAIN AND PRESSURE THAT GOES INTO BOTH EARS X 2 DAYS      History of Present Illness  HPI: 32 y/o F here with c/o as above  been having sinus pressure/headache for last 2 daysabove - denies skipping meals or poor PO intakeand studying master's degree  neurology for migraines and medications adjustedmaxalt for migraine headaches with relief, but this sx does not feel like migrainesleeping well at night, waking up in middle of nightfall or head injuryto lose weight recently, seeing new rheumatologist in 2 mos and needs refill of lyrica which she re-started  mildly positive with dizziness/similar sx on changing positionsany other complaints   Hospital Based Practices Required Assessment:   Readiness To Learn: Receptive-- and-- sleepy  Barriers To Learning: except as above  Education Completed: disease/condition,-- medications,-- further treatment/follow-up-- and-- treatment/procedure  Teaching Method: verbal-- and-- written  Person Taught: patient  Evaluation Of Learning: verbalized/demonstrated understanding      Review of Systems   Constitutional: no fever--   The patient presents with complaints of moderate feeling tired  ENT: no sore throat-- and-- no nasal discharge  Cardiovascular: no chest pain  Respiratory: no shortness of breath  Gastrointestinal: no abdominal pain  Genitourinary: no dysuria  Musculoskeletal: no arthralgias  Integumentary: no rashes  Neurological: headache-- and-- dizziness, but-- as noted in HPI  ROS reviewed  Active Problems    1  Abdominal pain (789 00) (R10 9)   2  Acute bacterial conjunctivitis of both eyes (372 03) (H10 33)   3  Adalimumab (Humira) long-term use (V58 69) (Z79 899)   4  Ankylosing spondylitis (720 0) (M45 9)   5  History of Birth Control   6  Cervical radiculopathy (723 4) (M54 12)   7  Concussion (850 9) (S06 0X9A)   8  Encounter to discuss test results (V65 49) (Z71 89)   9  Extensor tendinitis of foot (727 06) (M77 50)   10  Fatigue (780 79) (R53 83)   11  Headache, unspecified headache type (784 0) (R51)   12  Methotrexate, long term, current use (V58 69) (Z79 899)   13  Microscopic hematuria (599 72) (R31 29)   14  Migraine headache (346 90) (G43 909)   15  Moderate depressive episode (296 22) (F32 1)   16  Mood disorder (296 90) (F39)   17  Myofascial pain (729 1) (M79 1)   18  History of Need for HPV vaccination (V04 89) (Z23)   19  History of Need for prophylactic vaccination and inoculation against influenza (V04 81)  (Z23)   20  Numbness and tingling (782 0) (R20 0,R20 2)   21  Paresthesias (782 0) (R20 2)   22  Psoriasis (696 1) (L40 9)   23  Psoriatic arthropathy (696 0) (L40 50)   24   Rheumatoid arthritis of multiple sites with negative rheumatoid factor (714 0) (M06 09)   25  Right ankle pain (719 47) (M25 571)   26  Right ovarian cyst (620 2) (N83 201)   27  Screening for STD (sexually transmitted disease) (V74 5) (Z11 3)   28  HORTENCIA (stress urinary incontinence, female) (625 6) (N39 3)   29  Vitamin D deficiency (268 9) (E55 9)   30  Well female exam with routine gynecological exam (V72 31) (Z01 419)    Past Medical History  Active Problems And Past Medical History Reviewed: The active problems and past medical history were reviewed and updated today  Social History     · Alcohol Use (History)   · 1-2 drinks every week   · History of Birth Control   · History of Currently In School   · graduated 2015   · Daily Coffee Consumption (1  Cups/Day)   · Employed   · Teacher   · Never smoker   · No drug use   · Denied: History of Tobacco Use  The social history was reviewed and updated today  Current Meds   1  BuPROPion HCl ER (XL) 300 MG Oral Tablet Extended Release 24 Hour; TAKE ONE TABLET BY MOUTH ONCE DAILY AS DIRECTED; Therapy: 69KJX2770 to (Evaluate:20Nov2017)  Requested for: 21Sep2017; Last Rx:21Sep2017 Ordered   2  Lidocaine 5 % External Patch; 3 to affected area  12 hr on/ 12 hr off , max 3 per day; Therapy: 86KMD2027 to (Evaluate:20Jan2018)  Requested for: 10Bbm9789; Last Rx:94Avl5408 Ordered   3  Lyrica 150 MG Oral Capsule; TAKE 1 CAPSULE TWICE DAILY; Therapy: 98JHL4277 to (Evaluate:39Fhv9479); Last Rx:17Jun2016 Ordered   4  Mirena (52 MG) 20 MCG/24HR Intrauterine Intrauterine Device; Therapy: (Recorded:10Aug2016) to Recorded   5  Prochlorperazine Maleate 10 MG Oral Tablet; take 1 tablet by mouth every 8 hours as needed for nausea; Therapy: 25TVI1285 to (Evaluate:54Onp0392)  Requested for: 04HFR3049; Last Rx:68Msa7745 Ordered   6  Rizatriptan Benzoate 10 MG Oral Tablet; TAKE 1 TABLET AT ONSET OF HEADACHE  MAY REPEAT EVERY 2 HOURS AS NEEDED  MAXIMUM 3 TABLETS IN 24 HOURS;  Therapy: 13UUN9098 to [phone])  Requested for: 26AKR8308; Last Rx: 12NKK9504 Ordered   7  Topiramate 25 MG Oral Tablet; 2 po  bid; Therapy: 11DGP4299 to (Victorina Valentin)  Requested for: 24HOO1902; Last Rx:03Nov2017 Ordered    The medication list was reviewed and updated today  Allergies  1  No Known Drug Allergies    2  Other    Vitals   ** Printed in Appendix #1 below  Physical Exam   Constitutional  General appearance: Abnormal   overweight-- and-- acutely exhausted  Head and Face  Palpation of the face and sinuses: Abnormal   Examination of the Sinuses: right frontal tenderness-- and-- left frontal tenderness, but-- non-tender right maxillary-- and-- non-tender left maxillary  Eyes  Pupils and irises: Equal, round, reactive to light  Ears, Nose, Mouth, and Throat  Otoscopic examination: Tympanic membranes translucent with normal light reflex  Canals patent without erythema  Nasal mucosa, septum, and turbinates: Normal without edema or erythema  Oropharynx: Normal with no erythema, edema, exudate or lesions  Pulmonary  Respiratory effort: No increased work of breathing or signs of respiratory distress  Auscultation of lungs: Clear to auscultation  Cardiovascular  Auscultation of heart: Normal rate and rhythm, normal S1 and S2, no murmurs  Examination of extremities for edema and/or varicosities: Normal    Abdomen  Abdomen: Non-tender, no masses  Lymphatic  Palpation of lymph nodes in neck: No lymphadenopathy  Psychiatric  Judgment and insight: Normal    Mood and affect: Normal   Mood and Affect: calm  -- & falling asleep during appt        Future Appointments    Date/Time Provider Specialty Site   12/04/2017 05:00 PM Alia Sims [facility] Psychiatry [facility]   12/12/2017 05:00 PM Alia Sims LCSW Psychiatry [facility]   12/19/2017 05:00 PM Alia Sims [facility]   01/09/2018 04:00 PM Alia Sims [facility] Psychiatry [facility] 2018 05:00 PM FILIPE Garcia Psychiatry [facility]   2018 12:00 PM Syed Low [facility] Psychiatry [facility]   2018 05:00 PM Syed Low, [facility] Psychiatry [facility]   2018 05:00 PM Syed Low, [facility] Psychiatry [facility]   01/15/2018 11:15 AM Michel Patton [facility] Neurology [facility]  KALYN   2017 10:00 AM Emy Arvizu DO Psychiatry [facility]   2017 11:15 AM Deysi Iyer MD Pain Management [facility] SPINE   2017 04:30 PM Emeka Soto DO Internal Medicine [facility] INTERNAL MED       Signatures   Electronically signed by : Jason Mckinney DO; 2017 10:09PM EST                       (Author)    Appendix #1  Patient: Alessio Clements; : 1990; MRN: [id number]   Recorded: 14YGE4492 06:44PM Recorded: 06Wkg0579 06:43PM Recorded: 70DKH5177 06:01PM   Temperature    97 7 F   Heart Rate 82 88 76 80   Respiration    16   Systolic 176, RUE, Standing 104, RUE, Sitting 116, RUE, Supine 660   Diastolic 80, RUE, Standing 80, RUE, Sitting 78, RUE, Supine 80   Height    5 ft 4 in   Weight    177 lb 4 oz   BMI Calculated    30 43   BSA Calculated    1 86   O2 Saturation    98

## 2017-12-04 ENCOUNTER — ALLSCRIPTS OFFICE VISIT (OUTPATIENT)
Dept: OTHER | Facility: OTHER | Age: 27
End: 2017-12-04

## 2017-12-11 ENCOUNTER — ALLSCRIPTS OFFICE VISIT (OUTPATIENT)
Dept: OTHER | Facility: OTHER | Age: 27
End: 2017-12-11

## 2017-12-19 ENCOUNTER — ALLSCRIPTS OFFICE VISIT (OUTPATIENT)
Dept: OTHER | Facility: OTHER | Age: 27
End: 2017-12-19

## 2017-12-27 ENCOUNTER — GENERIC CONVERSION - ENCOUNTER (OUTPATIENT)
Dept: OTHER | Facility: OTHER | Age: 27
End: 2017-12-27

## 2017-12-28 ENCOUNTER — GENERIC CONVERSION - ENCOUNTER (OUTPATIENT)
Dept: OTHER | Facility: OTHER | Age: 27
End: 2017-12-28

## 2018-01-02 ENCOUNTER — ALLSCRIPTS OFFICE VISIT (OUTPATIENT)
Dept: OTHER | Facility: OTHER | Age: 28
End: 2018-01-02

## 2018-01-02 ENCOUNTER — TRANSCRIBE ORDERS (OUTPATIENT)
Dept: LAB | Facility: AMBULARY SURGERY CENTER | Age: 28
End: 2018-01-02

## 2018-01-02 ENCOUNTER — APPOINTMENT (OUTPATIENT)
Dept: LAB | Facility: AMBULARY SURGERY CENTER | Age: 28
End: 2018-01-02
Attending: OBSTETRICS & GYNECOLOGY
Payer: COMMERCIAL

## 2018-01-02 DIAGNOSIS — Z20.828 CONTACT WITH AND (SUSPECTED) EXPOSURE TO OTHER VIRAL COMMUNICABLE DISEASES: ICD-10-CM

## 2018-01-02 DIAGNOSIS — G43.709 CHRONIC MIGRAINE WITHOUT AURA WITHOUT STATUS MIGRAINOSUS, NOT INTRACTABLE: ICD-10-CM

## 2018-01-02 DIAGNOSIS — S06.0X9A CONCUSSION WITH LOSS OF CONSCIOUSNESS: ICD-10-CM

## 2018-01-02 DIAGNOSIS — M25.511 PAIN IN RIGHT SHOULDER: ICD-10-CM

## 2018-01-02 PROCEDURE — 36415 COLL VENOUS BLD VENIPUNCTURE: CPT

## 2018-01-02 PROCEDURE — 86695 HERPES SIMPLEX TYPE 1 TEST: CPT

## 2018-01-02 PROCEDURE — 86694 HERPES SIMPLEX NES ANTBDY: CPT

## 2018-01-02 PROCEDURE — 86696 HERPES SIMPLEX TYPE 2 TEST: CPT

## 2018-01-03 ENCOUNTER — GENERIC CONVERSION - ENCOUNTER (OUTPATIENT)
Dept: OTHER | Facility: OTHER | Age: 28
End: 2018-01-03

## 2018-01-03 LAB
HSV1 IGG SER IA-ACNC: <0.91 INDEX (ref 0–0.9)
HSV1+2 IGM SER IA-ACNC: <0.91 RATIO (ref 0–0.9)
HSV2 IGG SER IA-ACNC: <0.91 INDEX (ref 0–0.9)

## 2018-01-05 ENCOUNTER — GENERIC CONVERSION - ENCOUNTER (OUTPATIENT)
Dept: OTHER | Facility: OTHER | Age: 28
End: 2018-01-05

## 2018-01-05 ENCOUNTER — TRANSCRIBE ORDERS (OUTPATIENT)
Dept: ADMINISTRATIVE | Facility: HOSPITAL | Age: 28
End: 2018-01-05

## 2018-01-05 DIAGNOSIS — S49.91XA SHOULDER INJURY, RIGHT, INITIAL ENCOUNTER: Primary | ICD-10-CM

## 2018-01-09 ENCOUNTER — ALLSCRIPTS OFFICE VISIT (OUTPATIENT)
Dept: OTHER | Facility: OTHER | Age: 28
End: 2018-01-09

## 2018-01-09 NOTE — PSYCH
Progress Note  Psychotherapy Provided [facility]: Individual Psychotherapy 50 minutes provided today  Goals addressed in session:   Addressed goal 1 - 3 of initial plan    D: Met with Catalina ALATORRE; 'It's been a rough two weeks ' Yelitza Galeano explained she is crying frequently sometimes 'for no reason'  Session focused upon discussion with boyfriend; intimacy, becoming triggered during relations, communication with Weston Keen has worked at times, and others he 'jokes'  Further discussion regarding memories of father, historic trauma and use of mindfulness exercises  Denied SI     A: Yelitza Galeano presented as focused and engaged though she also utilized frequent laughing as a defense mechanism while talking about difficult and uncomfortable topics  Demonstrating progress in that she is choosing to bring these difficult topics for exploration  P: Continue individual therapy  Ongoing discussion regarding grief and relationships  Pain Scale and Suicide Risk [facility]: Current Pain Assessment: no pain   Current suicide risk is low   Behavioral Health Treatment Plan [facility]: Diagnosis and Treatment Plan explained to patient, patient relates understanding diagnosis and is agreeable to Treatment Plan            Signatures   Electronically signed by : Alta Howard LCSW; Nov 6 2017  5:57PM EST                       (Author)

## 2018-01-10 NOTE — PSYCH
Progress Note  Psychotherapy Provided [facility]: Individual Psychotherapy 50 minutes provided today  Goals addressed in session:   D: Met with Gabby Lovelace individually  ROS; "feeling very emotional'  States sleeping is very poor  This has been occurring for past 3-4 months  Session focused upon completion of initial treatment plan  Long term goals will focus upon grief, historic trauma and self esteem  Additional discussion regarding disagreement with boyfriend, triggers turning into a panic attack and finding a PCP 'whom she can trust and feel they listen to her ' Denied SI      A: Gabby Lovelace presented with appropriate mood and affect during session  Her frequent mood swings are cause judgemental thoughts of herself as her frustration tolerance becomes low and she ''gets angry inside and cries'  P: Continue individual therapy  Begin to process treatment goals  Pain Scale and Suicide Risk [facility]: Current Pain Assessment: moderate to severe   On a scale of 0 to 10, the patient rates current pain at 5   Current suicide risk is low   Behavioral Health Treatment Plan [address] [place] [address] [address]: Diagnosis and Treatment Plan explained to patient, patient relates understanding diagnosis and is agreeable to Treatment Plan  Results/Data  PHQ-9 Adult Depression Screening [id number] 04:54PM Hector Pedraza     Test Name Result Flag Reference   PHQ-9 Adult Depression Score 12     Over the last two weeks, how often have you been bothered by any of the following problems?   Little interest or pleasure in doing things: Several days - 1  Feeling down, depressed, or hopeless: More than half the days - 2  Trouble falling or staying asleep, or sleeping too much: Nearly every day - 3  Feeling tired or having little energy: Nearly every day - 3  Poor appetite or over eating: Not at all - 0  Feeling bad about yourself - or that you are a failure or have let yourself or your family down: More than half the days - 2  Trouble concentrating on things, such as reading the newspaper or watching television: Several days - 1  Moving or speaking so slowly that other people could have noticed  Or the opposite -  being so fidgety or restless that you have been moving around a lot more than usual: Not at all - 0  Thoughts that you would be better off dead, or of hurting yourself in some way: Not at all - 0   PHQ-9 Adult Depression Screening Positive     PHQ-9 Difficulty Level Extremely difficult     PHQ-9 Severity Moderate Depression         Assessment    1   Moderate depressive episode (296 22) (F32 1)    Signatures   Electronically signed by : Adamaris Briscoe LCSW; Aug 24 2017  5:05PM EST                       (Author)    Electronically signed by : Adamaris Briscoe LCSW; Aug 24 2017  5:06PM EST                       (Author)

## 2018-01-11 NOTE — PSYCH
Progress Note  Psychotherapy Provided [facility]: Individual Psychotherapy 50 minutes provided today  Goals addressed in session:   Addressed goal 1 of initial plan    D: Met with [facility] individually  ROS; 'not great  I'm kind of blah ' Session focused upon a recent lantern festival that may have triggered thoughts related to father's death as she sent one up in memory of him  Further discussion regarding current relationship, school responsibilities and changes where boyfriend may be attending school several hours away  Denied current SI     A: [facility] presented as focused and engaged during session  Her affect was appropriate and she was able to begin to sit with her feelings more as we discussed her father's death  Demonstrating progress in that she is maintaining a positive attitude as often as positive  P: Continue individual therapy  Ongoing discussion regarding grief and relationships  Pain Scale and Suicide Risk [facility]: Current Pain Assessment: no pain   Current suicide risk is low   Behavioral Health Treatment Plan [facility]: Diagnosis and Treatment Plan explained to patient, patient relates understanding diagnosis and is agreeable to Treatment Plan  Results/Data  PHQ-9 Adult Depression Screening [id number] 05:48PM [facility]     Test Name Result Flag Reference   PHQ-9 Adult Depression Score 9     Over the last two weeks, how often have you been bothered by any of the following problems?   Little interest or pleasure in doing things: Several days - 1  Feeling down, depressed, or hopeless: Several days - 1  Trouble falling or staying asleep, or sleeping too much: More than half the days - 2  Feeling tired or having little energy: More than half the days - 2  Poor appetite or over eating: Not at all - 0  Feeling bad about yourself - or that you are a failure or have let yourself or your family down: Several days - 1  Trouble concentrating on things, such as reading the newspaper or watching television: More than half the days - 2  Moving or speaking so slowly that other people could have noticed  Or the opposite -  being so fidgety or restless that you have been moving around a lot more than usual: Not at all - 0  Thoughts that you would be better off dead, or of hurting yourself in some way: Not at all - 0   PHQ-9 Adult Depression Screening Negative     PHQ-9 Difficulty Level Somewhat difficult     PHQ-9 Severity Mild Depression         Assessment    1   Moderate depressive episode (296 22) (F32 1)    Signatures   Electronically signed by : Elmira Castro LCSW; Oct 10 2017  4:11PM EST                       (Author)

## 2018-01-12 VITALS
OXYGEN SATURATION: 98 % | HEART RATE: 86 BPM | SYSTOLIC BLOOD PRESSURE: 110 MMHG | HEIGHT: 64 IN | RESPIRATION RATE: 18 BRPM | WEIGHT: 183.38 LBS | BODY MASS INDEX: 31.31 KG/M2 | TEMPERATURE: 97.7 F | DIASTOLIC BLOOD PRESSURE: 70 MMHG

## 2018-01-12 VITALS
WEIGHT: 187 LBS | DIASTOLIC BLOOD PRESSURE: 60 MMHG | SYSTOLIC BLOOD PRESSURE: 114 MMHG | BODY MASS INDEX: 31.92 KG/M2 | HEART RATE: 90 BPM | RESPIRATION RATE: 18 BRPM | HEIGHT: 64 IN
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
## 2018-01-12 NOTE — PSYCH
Behavioral Health Outpatient Intake    Referred By: DR CLAIRE  Intake Questions: there are no developmental disabilities  the patient does not have a hearing impairment  the patient does not have an ICM or CTT  patient is not taking injectable psychiatric medications  Employment: The patient is employed full time at [employer]  Emergency Contact Information:   Emergency Contact: LINUS Hernandez   Relationship to Patient: MOTHER   Phone Number: [phone]   Previous Psychiatric Treatment: She has not been previously seen by a psychiatrist     She has not been previously seen by a therapist     History: no  service  She has not had combat service  She was not activated into federal active duty as a member of the [employer] or [employer]  Insurance Subscriber: ELLY   Primary Insurance: [employer]         Presenting Problem (in patient's words): DEPRESSION  Substance Abuse: NONE  Previous Treatment: The patient has not been seen here in the past      Accepted as Patient   TERESA LINDA 6/13/17 @ 9AM     Primary Care Physician: DR Savanna Romero   Electronically signed by : Elizabeth Beach, ; May  1 2017  9:58AM EST                       (Author)

## 2018-01-13 VITALS
DIASTOLIC BLOOD PRESSURE: 76 MMHG | HEART RATE: 84 BPM | SYSTOLIC BLOOD PRESSURE: 114 MMHG | HEIGHT: 64 IN | WEIGHT: 184 LBS | BODY MASS INDEX: 31.41 KG/M2

## 2018-01-13 VITALS
OXYGEN SATURATION: 97 % | BODY MASS INDEX: 30.73 KG/M2 | DIASTOLIC BLOOD PRESSURE: 72 MMHG | SYSTOLIC BLOOD PRESSURE: 118 MMHG | RESPIRATION RATE: 16 BRPM | HEART RATE: 80 BPM | TEMPERATURE: 97.2 F | HEIGHT: 64 IN | WEIGHT: 180 LBS

## 2018-01-13 VITALS
BODY MASS INDEX: 31.76 KG/M2 | SYSTOLIC BLOOD PRESSURE: 114 MMHG | HEIGHT: 64 IN | WEIGHT: 186 LBS | DIASTOLIC BLOOD PRESSURE: 64 MMHG | HEART RATE: 93 BPM

## 2018-01-13 VITALS
RESPIRATION RATE: 18 BRPM | BODY MASS INDEX: 31.67 KG/M2 | HEART RATE: 76 BPM | TEMPERATURE: 97.8 F | DIASTOLIC BLOOD PRESSURE: 70 MMHG | HEIGHT: 64 IN | SYSTOLIC BLOOD PRESSURE: 108 MMHG | WEIGHT: 185.5 LBS | OXYGEN SATURATION: 97 %

## 2018-01-13 NOTE — RESULT NOTES
Verified Results  (1) THIN PREP PAP WITH IMAGING [id number] 98:19YU Omar Johnston     Test Name Result Flag Reference   LAB AP CASE REPORT (Report)     Gynecologic Cytology Report            Case: [id number]                  Authorizing Provider: Jeronimo Gale DO     Collected:      07/14/2016 1132        First Screen:     PAMELA Horan Received:      07/18/2016 1132        Specimen:  LIQUID-BASED PAP, SCREENING, Cervix   LAB AP GYN PRIMARY INTERPRETATION      Negative for intraepithelial lesion or malignancy  Electronically signed by PAMELA Horan on 7/21/2016 at 2:35 PM   LAB AP GYN SPECIMEN ADEQUACY      Satisfactory for evaluation  Endocervical/transformation zone component present  LAB AP GYN ADDITIONAL INFORMATION (Report)     [employer] FDA approved ,  and ThinPrep Imaging System are   utilized with strict adherence to the 's instruction manual to   prepare gynecologic and non-gynecologic cytology specimens for the   production of ThinPrep slides as well as for gynecologic ThinPrep imaging  These processes have been validated by our laboratory and/or by the     The Pap test is not a diagnostic procedure and should not be used as the   sole means to detect cervical cancer  It is only a screening procedure to   aid in the detection of cervical cancer and its precursors  Both   false-negative and false-positive results have been experienced  Your   patient's test result should be interpreted in this context together with   the history and clinical findings

## 2018-01-14 VITALS
BODY MASS INDEX: 29.13 KG/M2 | WEIGHT: 170.63 LBS | SYSTOLIC BLOOD PRESSURE: 120 MMHG | HEIGHT: 64 IN | DIASTOLIC BLOOD PRESSURE: 81 MMHG | HEART RATE: 76 BPM

## 2018-01-14 VITALS
HEIGHT: 64 IN | SYSTOLIC BLOOD PRESSURE: 104 MMHG | WEIGHT: 187 LBS | BODY MASS INDEX: 31.92 KG/M2 | RESPIRATION RATE: 16 BRPM | HEART RATE: 80 BPM | DIASTOLIC BLOOD PRESSURE: 72 MMHG

## 2018-01-14 VITALS
HEART RATE: 61 BPM | SYSTOLIC BLOOD PRESSURE: 117 MMHG | BODY MASS INDEX: 30.73 KG/M2 | DIASTOLIC BLOOD PRESSURE: 80 MMHG | WEIGHT: 180 LBS | HEIGHT: 64 IN

## 2018-01-14 VITALS
SYSTOLIC BLOOD PRESSURE: 100 MMHG | RESPIRATION RATE: 16 BRPM | HEIGHT: 64 IN | TEMPERATURE: 97.7 F | HEART RATE: 82 BPM | OXYGEN SATURATION: 98 % | DIASTOLIC BLOOD PRESSURE: 80 MMHG | BODY MASS INDEX: 30.26 KG/M2 | WEIGHT: 177.25 LBS

## 2018-01-14 NOTE — RESULT NOTES
Verified Results  (1) HERPES I/II ANTIBODIES, IGG [id number] 05:58PM DianaernieDiana     Test Name Result Flag Reference   HSV I BENIGNO IGG <0 91 index  0 00 - 0 90   Negative        <0 91                                   Equivocal 0 91 - 1 09                                   Positive        >1 09   Note: Negative indicates no antibodies detected to   HSV-1  Equivocal may suggest early infection  If   clinically appropriate, retest at later date  Positive   indicates antibodies detected to HSV-1    HSV II BENIGNO IGG <0 91 index  0 00 - 0 90   Negative        <0 91                                   Equivocal 0 91 - 1 09                                   Positive        >1 09   Note: Negative indicates no antibodies detected to   HSV-2  Equivocal may suggest early infection  If   clinically appropriate, retest at later date  Positive   indicates antibodies detected to HSV-2    Performed at:  [facility] [place] [address]  [id number]  : Royce Rodríguez MD, Phone:  [phone]       Signatures   Electronically signed by : SILVESTRE Bermudez ; Sep  1 2016  3:07PM EST                       (Author)

## 2018-01-15 ENCOUNTER — GENERIC CONVERSION - ENCOUNTER (OUTPATIENT)
Dept: OTHER | Facility: OTHER | Age: 28
End: 2018-01-15

## 2018-01-15 ENCOUNTER — HOSPITAL ENCOUNTER (OUTPATIENT)
Dept: RADIOLOGY | Age: 28
Discharge: HOME | End: 2018-01-15
Attending: ANESTHESIOLOGY
Payer: OTHER MISCELLANEOUS

## 2018-01-15 ENCOUNTER — ALLSCRIPTS OFFICE VISIT (OUTPATIENT)
Dept: OTHER | Facility: OTHER | Age: 28
End: 2018-01-15

## 2018-01-15 DIAGNOSIS — S49.91XA SHOULDER INJURY, RIGHT, INITIAL ENCOUNTER: ICD-10-CM

## 2018-01-15 PROCEDURE — 73221 MRI JOINT UPR EXTREM W/O DYE: CPT

## 2018-01-15 NOTE — RESULT NOTES
Verified Results  (1) TSH WITH FT4 REFLEX [id number] 09:08AM Mitzi SANCHEZ Order Number: [id number]     Test Name Result Flag Reference   TSH 1 174 uIU/mL  0 358-3 740   Patients undergoing fluorescein dye angiography may retain small amounts of fluorescein in the body for 48-72 hours post procedure  Samples containing fluorescein can produce falsely depressed TSH values  If the patient had this procedure,a specimen should be resubmitted post fluorescein clearance            The recommended reference ranges for TSH during pregnancy are as follows:  First trimester 0 1 to 2 5 uIU/mL  Second trimester  0 2 to 3 0 uIU/mL  Third trimester 0 3 to 3 0 uIU/m       Signatures   Electronically signed by : Andres Burgos DO; May  3 2017 10:36AM EST                       (Author)

## 2018-01-15 NOTE — PSYCH
Treatment Plan Tracking    #1 Treatment Plan not completed within required time limits due to: Client did not schedule an appointment within 15 days of initial assessment            Signatures   Electronically signed by : Aleshia Dave LCSW; Jun 13 2017  1:07PM EST                       (Author)

## 2018-01-15 NOTE — MISCELLANEOUS
Message   Recorded as Task   Date: 08/17/2017 04:08 PM, Created By: Jeanne Boles   Task Name: Follow Up   Assigned To: Ton Martines   Regarding Patient: Torres Hernandez, Status: Active   Comment:    Zeinab Carballo - 17 Aug 2017 4:08 PM     TASK CREATED  Caller: DORON, Parent; Other  MOM WANTS TO SWITCH TO A [address] RHEUMATOLOGIST - DR DAWSON [facility]  THEY SAID PT  WILL NEED A DOCTOR TO DOCTOR REFERRAL          Plan  Adalimumab (Humira) long-term use, Ankylosing spondylitis, Methotrexate, long term,  current use, Psoriatic arthropathy    · 3 - Denise Delcid DO  (Rheumatology) Co-Management  32 y/o F requests referral for  inflammatory arthritis    evaluate and treat  Status: Hold For - Scheduling  Requested for: [id number]  are Referring to a non- Preferred Provider : Patient refused suggestion for      recommended provider  Care Summary provided  : Yes    Signatures   Electronically signed by : Maryjo Hernandez DO; Aug 17 2017  8:50PM EST                       (Author)

## 2018-01-16 ENCOUNTER — GENERIC CONVERSION - ENCOUNTER (OUTPATIENT)
Dept: OTHER | Facility: OTHER | Age: 28
End: 2018-01-16
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
## 2018-01-16 NOTE — MISCELLANEOUS
Message   Recorded as Task   Date: 10/25/2017 08:03 AM, Created By: System   Task Name: Schedule Appointment   Assigned To: [facility],Team   Regarding Patient: Ina Shaffer, Status: In Progress   Comment:    System - 25 Oct 2017 8:03 AM     Preferred Communication: Mail  Ordering Site: [facility] [place]    Referred To: Esther Pacheco MD, Han LYNN  (Pain Management)  To Be Done: 25 Oct 2017   Radha Casey - 25 Oct 2017 8:25 AM     TASK REASSIGNED: Previously Assigned To Pretty Heller  please contact the patient for a consult   Maritza Villaseñor - 25 Oct 2017 1:36 PM     TASK EDITED  [facility]  PT RETURNED CALL --  CONTACTED -- Nereyda Clifford  APPT SCHEDULED WITH DR Srinivasan Ha  INTAKE UPDATED AND SENT TO [place] FOR DR Srinivasan Ha  NP PAPERWORK SENT TO PT  Maritza Villaseñor - 25 Oct 2017 1:36 PM     TASK COMPLETED   Cortney Bee - 25 Oct 2017 1:37 PM     TASK IN PROGRESS        Active Problems    1  Abdominal pain (789 00) (R10 9)   2  Acute bacterial conjunctivitis of both eyes (372 03) (H10 33)   3  Adalimumab (Humira) long-term use (V58 69) (Z79 899)   4  Ankylosing spondylitis (720 0) (M45 9)   5  History of Birth Control   6  Cervical radiculopathy (723 4) (M54 12)   7  Concussion (850 9) (S06 0X9A)   8  Encounter to discuss test results (V65 49) (Z71 89)   9  Extensor tendinitis of foot (727 06) (M77 50)   10  Fatigue (780 79) (R53 83)   11  Headache, unspecified headache type (784 0) (R51)   12  Methotrexate, long term, current use (V58 69) (Z79 899)   13  Microscopic hematuria (599 72) (R31 29)   14  Migraine headache (346 90) (G43 909)   15  Moderate depressive episode (296 22) (F32 1)   16  Myofascial pain (729 1) (M79 1)   17  History of Need for HPV vaccination (V04 89) (Z23)   18  History of Need for prophylactic vaccination and inoculation against influenza (V04 81)    (Z23)   19  Numbness and tingling (782 0) (R20 0,R20 2)   20   Paresthesias (782 0) (R20 2) 21  Psoriasis (696 1) (L40 9)   22  Psoriatic arthropathy (696 0) (L40 50)   23  Rheumatoid arthritis of multiple sites with negative rheumatoid factor (714 0) (M06 09)   24  Right ankle pain (719 47) (M25 571)   25  Right ovarian cyst (620 2) (N83 201)   26  Screening for STD (sexually transmitted disease) (V74 5) (Z11 3)   27  HORTENCIA (stress urinary incontinence, female) (625 6) (N39 3)   28  Vitamin D deficiency (268 9) (E55 9)   29  Well female exam with routine gynecological exam (V72 31) (Z01 419)    Current Meds   1  BuPROPion HCl ER (XL) 300 MG Oral Tablet Extended Release 24 Hour; TAKE ONE   TABLET BY MOUTH ONCE DAILY AS DIRECTED; Therapy: 74QHX2310 to (Evaluate:20Nov2017)  Requested for: 21Sep2017; Last   Rx:21Sep2017 Ordered   2  Ciprofloxacin HCl - 0 3 % Ophthalmic Solution; INSTILL 1 DROP IN BOTH EYES EVERY   4 HOURS DAILY; Therapy: 83LXN4151 to (Complete:30Oct2017)  Requested for: 49FBH8960; Last   Rx:20Oct2017 Ordered   3  Lidocaine 5 % External Patch; 3 to affected area  12 hr on/ 12 hr off , max 3 per   day; Therapy: 11WCG5851 to (Evaluate:20Jan2018)  Requested for: 52Gsp8158; Last   Rx:32Yor8725 Ordered   4  Lyrica 150 MG Oral Capsule; TAKE 1 CAPSULE TWICE DAILY; Therapy: 39AQZ2158 to (Evaluate:34Xuk8239); Last Rx:17Jun2016 Ordered   5  Mirena (52 MG) 20 MCG/24HR Intrauterine Intrauterine Device; Therapy: (Recorded:10Aug2016) to Recorded   6  Prochlorperazine Maleate 10 MG Oral Tablet; take 1 tablet by mouth every 8 hours as   needed for nausea; Therapy: 21UKF4233 to (Evaluate:96Rlc1305)  Requested for: 34IMV7674; Last   Rx:29Sep2017 Ordered   7  Rizatriptan Benzoate 10 MG Oral Tablet; TAKE 1 TABLET AT ONSET OF HEADACHE  MAY REPEAT EVERY 2 HOURS AS NEEDED  MAXIMUM 3 TABLETS IN 24 HOURS; Therapy: 02NJA2726 to ([facility]:41CCW5646)  Requested for: 40EOR8615; Last   Rx:35Bgx5099 Ordered   8  Topiramate 25 MG Oral Tablet[facility];    Therapy: 38AJK2972 to (Alan Wagner)  Requested for: 91GJK0051; Last   Rx:92Pmt4709 Ordered    Allergies    1  No Known Drug Allergies    2  Other    Signatures   Electronically signed by :  Tessy Valenzuela, ; Oct 26 2017  2:05PM EST                       (Author)

## 2018-01-16 NOTE — PROGRESS NOTES
Assessment    1  Ankylosing spondylitis (720 0) (M45 9)   2  Psoriasis (696 1) (L40 9)   3  Psoriatic arthropathy (696 0) (L40 50)   4  Rheumatoid arthritis of multiple sites with negative rheumatoid factor (714 0) (M06 09)   5  Vitamin D deficiency (268 9) (E55 9)   6  Myofascial pain (729 1) (M79 1)   7  Adalimumab (Humira) long-term use (V58 69) (Z79 899)   8  Methotrexate, long term, current use (V58 69) (Z79 899)    Plan    1  Nabumetone 750 MG Oral Tablet    2  TraMADol HCl - 50 MG Oral Tablet; Take 1 to 2 tablets every 4 to 6 hours prn pain   3  Follow-up PRN Evaluation and Treatment  Follow-up  Status: Complete  Done:   17IWA8166    4  Humira Pen 40 MG/0 8ML Subcutaneous Pen-injector Kit   5  Methotrexate 2 5 MG Oral Tablet   6  Lyrica 75 MG Oral Capsule; Take 1 capsule twice daily   7  Call [phone] if: The pain seems worse ; Status:Complete;   Done: 82Jyt5515   8  Call [phone] if: The symptoms seem worse ; Status:Complete;   Done:   97Thn3167   9  Call [phone] if: You have questions or concerns about your problem ;   Status:Complete;   Done: 61EFM7728    Discussion/Summary    Ms Lynda Champion has been feeling about the same since her last evaluation  She has not had any appreciable difference in her pain since increasing the methotrexate  She complains of pain in her bilateral elbows, which is constant  She also reports intermittent arthralgias in her lower back, hips, knees, and ankles  She has noted swelling in her ankles, knees, and elbows  She was evaluated by dermatology for the rash on her forearms, and they thought it might be due to possible dry skin  She does report some mild morning stiffness for 5-15 minutes in duration, as well as jelling throughout the day  She also reports difficulty sleeping, nonrestorative sleep, and fatigue which seems to be worsening   She would like to discontinue the methotrexate and the Humira at this point, as she does not find that they are helpful for her discomfort  She is also interested in discontinuing the nabumetone as well  On exam, there is mild synovitis of the PIPs of her hands, there is significant synovitis and tenderness of the bilateral elbows  There is also mild synovitis of the bilateral ankles  There is significant paraspinal spasm in the lumbar spine area  Review of laboratory studies revealed a normal CBC, CMP, and ESR  CRP was elevated at 7 2, which was increased from a prior level  TSH was within normal limits  At this time, her ankylosing spondylitis appears active and uncontrolled despite methotrexate and Humira  She does have some clinical features suggestive of a seronegative rheumatoid arthritis versus a psoriatic arthritis as well  We did discuss several treatment options, and we have opted to discontinue the Humira and the methotrexate at this time  I will also discontinue the nabumetone  She will continue the Lyrica, and I have provided her with a prescription for tramadol to use as needed for severe pain  She is interested in researching the possible cost of [place], [place] and [place]  We will perform and benefits investigation for each of these medications, and she will decide which one she wants to pursue based on the out-of-pocket cost  Based on her decision with regards to biologic therapy, we will schedule a follow-up after her decision is made  She will call in the interim if there are any questions or concerns  Counseling  Rheumatology Counseling Documentation: The patient and patient's family was counseled regarding diagnostic results, instructions for management, impressions and risks and benefits of treatment options  Chief Complaint  F/U AS   Patient is here today for follow up of chronic conditions described in HPI  History of Present Illness  Pt  returns for F/U for AS  Feeling about the same since the last visit  c/o pain in elbows, lower back, hips, knees, and ankles  Elbow pain is constant   Lower back pain is there most of the time  No additional pain medications taken  + swelling in ankles, knees, and elbows  + AM stiffness x 5-15 minutes  + gelling throughout day  + difficulty sleeping due to pain  + non-restorative sleep  + fatigue -> worsening since last visit  RAPID3: 16 8/30      Review of Systems    Constitutional: fatigue, but no fever, no recent weight gain, no chills, no recent weight loss and no anorexia  HEENT: dryness of the eyes, but no blurred vision, no double vision, no amaurosis fugax, no eye pain, no erythema eye(s), no dryness mouth, no mouth sores, not feeling congested and no sore throat  Cardiovascular: no chest pain or pressure, no dyspnea on exertion and no swelling in the arms or legs  Respiratory: no unusual or persistent cough, no shortness of breath and no pleurisy  Gastrointestinal: constipation, but no abdominal pain, no vomiting, no heartburn, no melena and no BRBPR    The patient presents with complaints of occasional episodes of nausea  The patient presents with complaints of diarrhea (intermittent with constipation)   Genitourinary: no foamy urine, but no dysuria and no hematuria  Musculoskeletal: as noted in HPI  Integumentary no rash, no Raynaud's, no alopecia, no nail changes and no photosensitivity  Endocrine no polyuria and no polydipsia  Hematologic/Lymphatic: a tendency for easy bruising, but no unusual bleeding  Neurological: headache, tingling and weakness, but no vertigo or dizziness  Psychiatric: insomnia and non-restorative sleep  Active Problems    1  Adalimumab (Humira) long-term use (V58 69) (Z79 899)   2  Ankylosing spondylitis (720 0) (M45 9)   3  Birth Control   4  Contraception management (V25 9) (Z30 9)   5  Extensor tendinitis of foot (727 06) (M77 50)   6  Fatigue (780 79) (R53 83)   7  Methotrexate, long term, current use (V58 69) (Z79 899)   8  Myofascial pain (729 1) (M79 1)   9   History of Need for HPV vaccination (V04 89) (Z23)   10  History of Need for prophylactic vaccination and inoculation against influenza (V04 81)    (Z23)   11  Paresthesias (782 0) (R20 2)   12  Psoriasis (696 1) (L40 9)   13  Right ankle pain (719 47) (M25 571)   14  Screening for STD (sexually transmitted disease) (V74 5) (Z11 3)   15  Vitamin D deficiency (268 9) (E55 9)    Past Medical History    1  History of Exposure to influenza (V01 79) (Z20 828)   2  History of Lateral epicondylitis, unspecified laterality (726 32) (M77 10)   3  History of Need for HPV vaccination (V04 89) (Z23)   4  History of Need for prophylactic vaccination and inoculation against influenza (V04 81)   (Z23)   5  History of Right ankle sprain (845 00) (S93 401A)   6  History of Spondylitis (720 9) (M46 90)    The active problems and past medical history were reviewed and updated today  Surgical History    1  History of Breast Surgery Reduction Procedure Bilateral   2  History of Jaw Surgery    The surgical history was reviewed and updated today  Family History    1  Family history of osteoporosis (V17 81) (Z82 62)   2  Family history of Fibromyalgia   3  Family history of Rheumatoid Arthritis   4  Family history of Ulcerative Colitis    5  Family history of Adenocarcinoma    6  Denied: Family history of Crohn's disease   7  Denied: Family history of psoriatic arthritis   8  Denied: Family history of rheumatoid arthritis   9  Denied: Family history of systemic lupus erythematosus   10  Family history of Hypertension (V17 49)   11  Family history of Peptic Ulcer   12  Family history of Reported Family History Of Kidney Disease    The family history was reviewed and updated today  Social History    · Alcohol Use (History)   · Birth Control   · History of Currently In School   · Daily Coffee Consumption (1  Cups/Day)   · Employed   · Never smoker   · No drug use   · Denied: History of Tobacco Use  The social history was reviewed and updated today   The social history was reviewed and is unchanged  Current Meds   1  Ergocalciferol 77201 UNIT Oral Capsule; TAKE 1 CAPSULE ONCE WEEKLY; Therapy: 91Kby1082 to (Evaluate:2016); Last Rx:20Uey2990 Ordered   2  Folic Acid 1 MG Oral Tablet; Take 1 tablet daily; Therapy: (Recorded:53Oia9958) to Recorded   3  Humira Pen 40 MG/0 8ML Subcutaneous Pen-injector Kit; Inject 1 pen SC Q other week   as directed; Therapy: 72Hdj9246 to (Evaluate:2016); Last Rx:55Bqg9072 Ordered   4  Kariva 0 15-0 02/0 01 MG () Oral Tablet; Take 1 tablet daily; Therapy: 88IOV4967 to Recorded   5  Kimidess 0 15-0 02/0 01 MG () Oral Tablet; TAKE ONE TABLET BY MOUTH ONCE   DAILY; Therapy: 94CBZ5629 to (Evaluate:2016); Last Rx:04Ukw7989 Ordered   6  Lyrica 75 MG Oral Capsule; Take 1 capsule twice daily; Therapy: 78OOY9722 to (Evaluate:2016); Last Rx:46Asc2651 Ordered   7  Methotrexate 2 5 MG Oral Tablet; TAKE 8 TABLET Weekly  Requested for: 93BXH5371;   Last Rx:12Vdv1757; Status: ACTIVE - Renewal Denied Ordered   8  Nabumetone 750 MG Oral Tablet; TAKE 1 TABLET Twice daily PRN PAIN WITH FOOD; Therapy: 14EIU1264 to (Evaluate:48Guj0442)  Requested for: 39VQJ0753; Last   Rx:45Dhg8234 Ordered    The medication list was reviewed and updated today  Immunizations  DTP/DTaP --- Maria Isabel Gayk: 56DBY8779 (4M); Ktay [id number] Haus: [id number] Brownie: [id number]   Hepatitis B --- Maria Isabel Gayk: 79QSP7906 (11M); Katy Hoppin86RHS7375 (14M); [facility]: 98HGV6808 (18M)   HPV --- Maria Isabeladrianne Nash: 94VUN1695WexhhErnestina Segal: 60JSO5648; [facility]: 75DLF7398   Influenza --- Series1: 61JEA6339   Meningococcal --- Series1: 22IKJ9424 (18Y); Katy Hoppin24VVA5571   MMR --- Maria Isabeladrianne Rashidk: 81BKN5014 (18M); Katy Hoppin49UNW7848 Edel Bless)   Polio --- Maria Isabeladrianne Rashidk: 91AIM2555 (4M); Katy Hoppin57ZEF2584 (8M); Felix Giraldo: 54YOJ1961 (21M)   PPD --- Maria Isabel Rashidk: 69ODB3334 (2Y); Katy Hoppin18FBP9883 (18Y); Felix Giraldo: 05HQO8132   Td/DT --- Maria Isabel Rashidk: 64DDG6496 (8Y);  Series2: 45JLK9798 (17Y)   Varicella --- Andrew Cristobal: 33VTG6417 (5Y)     Allergies    1  No Known Drug Allergies    Physical Exam    Constitutional   General appearance: Abnormal   overweight  Eyes   Conjunctiva and lids: No swelling, erythema or discharge  Pupils and irises: Equal, round and reactive to light  Ears, Nose, Mouth, and Throat   External inspection of ears and nose: Normal     Oropharynx: Normal with no erythema, edema, exudate lesions, or ulcers  Pulmonary   Respiratory effort: No increased work of breathing or signs of respiratory distress  Auscultation of lungs: Clear to auscultation  Cardiovascular   Auscultation of heart: Normal rate and rhythm, normal S1 and S2, without murmurs  Examination of extremities for edema and/or varicosities: Normal     Lymphatic   Palpation of lymph nodes in neck: No lymphadenopathy  Psychiatric   Orientation to person, place, and time: Normal     Mood and affect: Normal         Right elbow tenderness and swelling  Left Elbow tenderness and swelling  Right ankle tenderness  Right hip tenderness  Left ankle tenderness  Left hip tenderness  Skin - Skin and subcutaneous tissue: Abnormal  Clinical impression: Psoriasis on both forearms  Neurologic - Sensation: Normal      The patient has tenderness in all PIP and DIP joints of the right hand  The patient has tenderness in all PIP and DIP joints of the left hand  The patient has swelling of all PIP joints of the left hand  Results/Data  Results   (1) CBC/PLT/DIFF 31KPU2058 05:27PM Eladia Shruthi    Order Number: [id number]     Order Number: [id number]     Test Name Result Flag Reference   WBC COUNT 8 37 Thousand/uL  4 31-10 16   RBC COUNT 4 31 Million/uL  3 81-5 12   HEMOGLOBIN 11 9 g/dL  11 5-15 4   HEMATOCRIT 37 0 %  34 8-46  1   MCV 86 fL  82-98   MCH 27 6 pg  26 8-34 3   MCHC 32 2 g/dL  31 4-37 4   RDW 13 7 %  11 6-15 1   MPV 11 6 fL  8 9-12 7   PLATELET COUNT 042 Thousands/uL  149-390   NEUTROPHILS RELATIVE PERCENT 48 %  43-75   LYMPHOCYTES RELATIVE PERCENT 43 %  14-44   MONOCYTES RELATIVE PERCENT 8 %  4-12   EOSINOPHILS RELATIVE PERCENT 1 %  0-6   BASOPHILS RELATIVE PERCENT 0 %  0-1   NEUTROPHILS ABSOLUTE COUNT 3 98 Thousands/µL  1 85-7 62   LYMPHOCYTES ABSOLUTE COUNT 3 62 Thousands/µL  0 60-4 47   MONOCYTES ABSOLUTE COUNT 0 69 Thousand/µL  0 17-1 22   EOSINOPHILS ABSOLUTE COUNT 0 06 Thousand/µL  0 00-0 61   BASOPHILS ABSOLUTE COUNT 0 02 Thousands/µL  0 00-0 10     (1) COMPREHENSIVE METABOLIC PANEL 31QEP3783 05:01HQ [employer] Order Number: [id number]      National Kidney Disease Education Program recommendations are as follows:  GFR calculation is accurate only with a steady state creatinine  Chronic Kidney disease less than 60 ml/min/1 73 sq  meters  Kidney failure less than 15 ml/min/1 73 sq  meters  Test Name Result Flag Reference   GLUCOSE,RANDM 116 mg/dL     If the patient is fasting, the ADA then defines impaired fasting glucose as > 100 mg/dL and diabetes as > or equal to 123 mg/dL     SODIUM 139 mmol/L  136-145   POTASSIUM 3 6 mmol/L  3 5-5 3   CHLORIDE 103 mmol/L  100-108   CARBON DIOXIDE 26 mmol/L  21-32   ANION GAP (CALC) 10 mmol/L  4-13   BLOOD UREA NITROGEN 11 mg/dL  5-25   CREATININE 0 58 mg/dL L 0 60-1 30   Standardized to IDMS reference method   CALCIUM 8 9 mg/dL  8 3-10 1   BILI, TOTAL 0 36 mg/dL  0 20-1 00   ALK PHOSPHATAS 39 U/L L    ALT (SGPT) 25 U/L  12-78   AST(SGOT) 19 U/L  5-45   ALBUMIN 3 8 g/dL  3 5-5 0   TOTAL PROTEIN 7 3 g/dL  6 4-8 2   eGFR Non-African American      >60 0 ml/min/1 73sq m     (1) C-REACTIVE PROTEIN 91LAK9485 05:27PM [employer] Order Number: [id number]     Test Name Result Flag Reference   C-REACT PROTEIN 7 2 mg/L H <3 0     (1) SED RATE 21QJB9464 05:27PM [employer] Order Number: [id number]     Test Name Result Flag Reference   SED RATE 6 mm/hour  0-20     (1) TSH WITH FT4 REFLEX 59QOH0165 04:20PM Alverto Spivey Test Name Result Flag Reference   TSH, 3RD GEN W/REFLEX TO FT4 1 754 uIU/ML  0 358-3 740   *Patients undergoing fluorescein dye angiography may retain small  amounts of fluorescein in the body for 48-72 hours post procedure  Samples containing fluorescein can produce falsely depressed TSH   values  If the patient had this procedure, a specimen should be  resubmitted post fluorescein clearance  Signatures   Electronically signed by : Andi Rodriguez DO;  Apr 7 2016  9:21PM EST                       (Author)

## 2018-01-16 NOTE — PSYCH
Progress Note  Psychotherapy Provided [facility]: Individual Psychotherapy 50 minutes provided today  Goals addressed in session:   Addressed goal 1 of initial plan    D: Met with Aashish Romero individually  ROS; 'I have my good and bad days'  Expressed feelings periods of stress at work however concentrates upon 'staying positive' as she enjoys her work  Session focused upon Catalina's father's death; his illness, hospice and how Unayanique Zuletaal has addressed her grief  Stated she often has memories of dad and will think 'I want my dad' when she is upset or experiencing anger  Since last session, Aashish Songal has 'dug her nails in her hand' due to feeling angry about herself  Denied current SI     A: Kiramili Romero presented as focused and engaged during session  He affect was appropriate but I feel her frequent giggles was a defense mechanism as we discussed her father's death  Demonstrating progress in that she is maintaining a positive attitude as often as positive  P: Continue individual therapy  Ongoing discussion regarding grief and relationships  Pain Scale and Suicide Risk [facility]: Current Pain Assessment: no pain   Current suicide risk is low   Behavioral Health Treatment Plan [facility]: Diagnosis and Treatment Plan explained to patient, patient relates understanding diagnosis and is agreeable to Treatment Plan  Assessment    1   Moderate depressive episode (296 22) (F32 1)    Signatures   Electronically signed by : Laly Esquivel LCSW; Sep 25 2017  6:09PM EST                       (Author)

## 2018-01-17 NOTE — RESULT NOTES
Verified Results  * US PELVIS COMPLETE [facility] [place] AND TRANSVAGINAL) 14YJF2217 03:53PM Vangie Nesbitt Order Number: [id number]    - Patient Instructions: To schedule this appointment, please contact Central Scheduling at [phone]  Test Name Result Flag Reference   US PELVIS COMPLETE (TRANSABDOMINAL AND TRANSVAGINAL) (Report)     PELVIC ULTRASOUND, COMPLETE     INDICATION: Follow-up of right ovarian cyst           COMPARISON: CT of the abdomen/pelvis dated 8/11/2017  TECHNIQUE:  Transabdominal pelvic ultrasound was performed in sagittal and transverse planes with a curvilinear transducer  Additional transvaginal imaging was performed to better evaluate the endometrium and ovaries  Imaging included volumetric    sweeps as well as traditional still imaging technique  FINDINGS:     UTERUS:   The uterus is anteflexed in position, measuring 8 3 cm  Contour and echotexture appear normal      The cervix shows no suspicious abnormality  ENDOMETRIUM:    Appropriately positioned IUD at the fundus largely diffuse endometrial stripe  No thickening, though  OVARIES/ADNEXA:   Right ovary: 15 mL (normal is 3 to 20 mL)   3 cm cystic lesion with areas of lacelike internal septum patient's and hypoechoic nodular eccentric focus most likely retracting clot  No color flow demonstrated within the lesion  Doppler flow within normal limits  Normal arterial waveform  Left ovary: 7 mL   No suspicious left ovarian abnormality  Doppler flow within normal limits  Normal arterial waveform  No suspicious adnexal mass or loculated collections  Trace free fluid around the right ovary  IMPRESSION:      1  Right cystic lesion is almost certainly a hemorrhagic cyst  If the patient has persistent right pelvic pain then repeat ultrasound in no sooner than 6 weeks can be considered to document resolution  2  Normal left ovary       3  Appropriately positioned IUD in the normal-appearing uterus         Workstation performed: [id number]     Signed by:   Silvestre Howard MD   8/21/17
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
## 2018-01-22 VITALS
WEIGHT: 177.5 LBS | HEART RATE: 88 BPM | BODY MASS INDEX: 30.3 KG/M2 | RESPIRATION RATE: 16 BRPM | SYSTOLIC BLOOD PRESSURE: 112 MMHG | DIASTOLIC BLOOD PRESSURE: 82 MMHG | HEIGHT: 64 IN

## 2018-01-22 VITALS
BODY MASS INDEX: 31.76 KG/M2 | SYSTOLIC BLOOD PRESSURE: 112 MMHG | DIASTOLIC BLOOD PRESSURE: 72 MMHG | WEIGHT: 186 LBS | HEIGHT: 64 IN | HEART RATE: 81 BPM

## 2018-01-23 VITALS
WEIGHT: 185 LBS | HEART RATE: 71 BPM | HEIGHT: 64 IN | DIASTOLIC BLOOD PRESSURE: 64 MMHG | SYSTOLIC BLOOD PRESSURE: 110 MMHG | OXYGEN SATURATION: 98 % | BODY MASS INDEX: 31.58 KG/M2

## 2018-01-23 VITALS
SYSTOLIC BLOOD PRESSURE: 112 MMHG | BODY MASS INDEX: 31.24 KG/M2 | DIASTOLIC BLOOD PRESSURE: 82 MMHG | WEIGHT: 183 LBS | HEIGHT: 64 IN | HEART RATE: 78 BPM

## 2018-01-23 VITALS
SYSTOLIC BLOOD PRESSURE: 113 MMHG | BODY MASS INDEX: 30.38 KG/M2 | WEIGHT: 177 LBS | DIASTOLIC BLOOD PRESSURE: 78 MMHG | RESPIRATION RATE: 16 BRPM | HEART RATE: 69 BPM

## 2018-01-23 NOTE — PSYCH
Progress Note  Psychotherapy Provided [facility]: Individual Psychotherapy 50 minutes provided today  Goals addressed in session:   Addressed goal 1 - 3 of initial plan    D: Met with Catalina individually  ROS; 'a lot is going on ' Session focused upon symptoms of depression slightly increasing, medical matter that caused her anxiety and a change in DX for another medical issue  Discussed Catalina's urges to purge however she was able to distract from both times  Contributing factors of recent news triggering body image and self esteem  Denied current SIB or SI     A: Remington Cardenas presented with slightly depressed mood and constricted affect  Topics discussed today were difficult therefore presentation was seen as appropriate  P: Continue individual therapy  Ongoing discussion regarding body image, medical test results and urges to purge  Pain Scale and Suicide Risk [facility]: Current Pain Assessment: moderate to severe   On a scale of 0 to 10, the patient rates current pain at 6   Current suicide risk is low   Behavioral Health Treatment Plan [facility]: Diagnosis and Treatment Plan explained to patient, patient relates understanding diagnosis and is agreeable to Treatment Plan  Assessment    1  Eating disorder, unspecified (307 50) (F50 9)   2  SHIRLEY (generalized anxiety disorder) (300 02) (F41 1)   3  PTSD (post-traumatic stress disorder) (309 81) (F43 10)   4  Self-injurious behavior (300 9) (F48 9)   5   Moderate episode of recurrent major depressive disorder (296 32) (F33 1)    Signatures   Electronically signed by : Rivka Bradford LCSW; Clement 10 2018  8:19AM EST                       (Author)
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
## 2018-01-23 NOTE — MISCELLANEOUS
Message   Recorded as Task   Date: 12/27/2017 05:21 PM, Created By: [employer]   Task Name: Follow Up   Assigned To: Venancio Colon   Regarding Patient: Janis Jaime, Status: Active   CommentFelicitas Harden - 27 Dec 2017 5:21 PM     TASK CREATED  Caller: Self; General Medical Question; [phone] (Home); [phone] (Work)    Patient left message on nurse line  Would like a call back regarding herpes testing  Nick De Luna - 28 Dec 2017 10:26 AM     TASK EDITED  Voicemailbox is full unable to leave a message  Nick De Luna - 28 Dec 2017 11:32 AM     TASK EDITED  Boyfriend has herpes and had an outbreak they had unprotected intercourse  Pt states she now has two sores in the vaginal area and is concerned that she may have herpes  She has pain with urinating in these area  Pt advised to schedule first available appointment with provider on 1/2  I will routing to provider for further recommendations until that appointment  Nick De Luna - 28 Dec 2017 1:14 PM     TASK EDITED  Pt has appt 7/5/10 with Dr Andria Velasquez   Routing to provider for prescription prior to appointment  Natahsa Price - 28 Dec 2017 2:56 PM     TASK REPLIED TO: Previously Assigned To Natasha Price               rx for valtrex 1gm q12h x 10 days sent to pharmacy        Active Problems    1  Abdominal pain (789 00) (R10 9)   2  Acute bacterial conjunctivitis of both eyes (372 03) (H10 33)   3  Adalimumab (Humira) long-term use (V58 69) (Z79 899)   4  Ankylosing spondylitis (720 0) (M45 9)   5  History of Birth Control   6  Cervical radiculopathy (723 4) (M54 12)   7  Concussion (850 9) (S06 0X9A)   8  Eating disorder, unspecified (307 50) (F50 9)   9  Encounter to discuss test results (V65 49) (Z71 89)   10  Extensor tendinitis of foot (727 06) (M77 50)   11  Fatigue (780 79) (R53 83)   12  Feeling tired (780 79) (R53 83)   13  Fibromyalgia (729 1) (M79 7)   14   SHIRLEY (generalized anxiety disorder) (300 02) (F41 1)   15  Headache, unspecified headache type (784 0) (R51)   16  Herpes simplex infection (054 9) (B00 9)   17  Methotrexate, long term, current use (V58 69) (Z79 899)   18  Microscopic hematuria (599 72) (R31 29)   19  Migraine headache (346 90) (G43 909)   20  Moderate episode of recurrent major depressive disorder (296 32) (F33 1)   21  Myofascial pain (729 1) (M79 1)   22  History of Need for HPV vaccination (V04 89) (Z23)   23  History of Need for prophylactic vaccination and inoculation against influenza (V04 81)    (Z23)   24  Numbness and tingling (782 0) (R20 0,R20 2)   25  Paresthesias (782 0) (R20 2)   26  Psoriasis (696 1) (L40 9)   27  Psoriatic arthropathy (696 0) (L40 50)   28  PTSD (post-traumatic stress disorder) (309 81) (F43 10)   29  Rheumatoid arthritis of multiple sites with negative rheumatoid factor (714 0) (M06 09)   30  Right ankle pain (719 47) (M25 571)   31  Right ovarian cyst (620 2) (N83 201)   32  Screening for STD (sexually transmitted disease) (V74 5) (Z11 3)   33  Self-injurious behavior (300 9) (F48 9)   34  Sinus headache (784 0) (R51)   35  HORTENCIA (stress urinary incontinence, female) (625 6) (N39 3)   36  Vitamin D deficiency (268 9) (E55 9)   37  Well female exam with routine gynecological exam (V72 31) (Z01 419)    Current Meds   1  Azelastine HCl - 0 1 % Nasal Solution; USE 1 SPRAY IN EACH NOSTRIL TWICE DAILY; Therapy: 84PNN9223 to (Last Rx:21Nov2017)  Requested for: 21Nov2017 Ordered   2  FLUoxetine HCl - 20 MG Oral Capsule (PROzac); take 1 capsule daily  After 2 weeks,   increase to 2 capsules daily; Therapy: 34KBL8517 to (Last Rx:11Dec2017)  Requested for: 11Dec2017 Ordered   3  Lidocaine 5 % External Patch; 3 to affected area  12 hr on/ 12 hr off , max 3 per   day; Therapy: 92GWN8040 to (Evaluate:20Jan2018)  Requested for: 01Lrn7177; Last   Rx:28Nqj4743 Ordered   4   Lyrica 75 MG Oral Capsule; TAKE 1 CAPSULE IN MORNING (IN ADDITION TO 75MG IN   AM AND 150MG IN PM); Therapy: 24SSM0091 to (Evaluate:27Mar2018); Last Rx:88Kwq5554 Ordered   5  Lyrica 75 MG Oral Capsule; TAKE ONE CAPSULE IN MORNING AND TWO CAPSULES   IN EVENING; Therapy: 08ENR4944 to (Evaluate:80Ctk3148); Last Rx:21Nov2017 Ordered   6  Mirena (52 MG) 20 MCG/24HR Intrauterine Intrauterine Device; Therapy: (Recorded:10Aug2016) to Recorded   7  Prochlorperazine Maleate 10 MG Oral Tablet; take 1 tablet by mouth every 8 hours as   needed for nausea; Therapy: 37UMF2145 to (Evaluate:62Sha1121)  Requested for: 29VKH3260; Last   Rx:10Igf0346 Ordered   8  Rizatriptan Benzoate 10 MG Oral Tablet; TAKE 1 TABLET AT ONSET OF HEADACHE  MAY REPEAT EVERY 2 HOURS AS NEEDED  MAXIMUM 3 TABLETS IN 24 HOURS; Therapy: 45AYY8485 to Jackie Urias)  Requested for: ; Last   Rx:59Zpo9594; Status: ACTIVE - Renewal Denied Ordered   9  Topiramate 25 MG Oral Tablet; 2 po  bid; Therapy: 63LSX5496 to (Evaluate:18Jun2018)  Requested for: 26Oyo5306; Last   Rx:80Odv5720 Ordered   10  ValACYclovir HCl - 1 GM Oral Tablet; TAKE 1 TABLET EVERY 12 HOURS DAILY; Therapy: 74JWP7863 to ([phone])  Requested for: 94Sfo3552; Last    Rx:37Mfi5685 Ordered    Allergies    1  No Known Drug Allergies    2   Other    Signatures   Electronically signed by : Samuel Wintres DO; Dec 28 5677  3:03PM EST                       (Author)

## 2018-01-23 NOTE — MISCELLANEOUS
Message   Recorded as Task   Date: 12/27/2017 10:51 AM, Created By: Estrellita Lemus   Task Name: Follow Up   Assigned To: Abisai Lamas   Regarding Patient: Thomas Campo, Status: Active   Comment:    Vidya Ma - 27 Dec 2017 10:51 AM     TASK CREATED  Caller: onelia/mom; General Medical Question; [phone]  pts mother called  pt saw a rheumatologist, dr Arnaldo Lemon, at [facility], who will treat her for her autoimmune disease but not her fibromyalgia  they said she should continue with pcp for fibromyalgia treatment  pt is on 225 mg of lyrica daily but she is not responding to that dose  they want an increase to 300mg daily  [facility]  they just want a prescription for 75 mg tabs to supplement the 225 mg tabs they already have at home  call [phone] to advise  Ton Martines - 27 Dec 2017 5:54 PM     TASK REPLIED TO: Previously Assigned To [facility],Team  sent rx [facility] 75mg [facility]   then rx will be written as:    take 2 (75mg) tablets twice a day    thanks        Plan  Fibromyalgia    · Lyrica 75 MG Oral Capsule; TAKE 1 CAPSULE IN MORNING (IN ADDITION TO  75MG IN AM AND 150MG IN PM)    Signatures   Electronically signed by Wilfredo Anderson DO; Dec 27 2017  5:56PM EST                       (Author)

## 2018-01-23 NOTE — PSYCH
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, health concerns etc  Tim Strange feels Yevgenyi Jane has been very supportive with acerbating of health issues arising from previous concussion  Discussed mood fluctuations ranging from anger, tearfulness and isolation due to anxiety  Hypomanic states, impulsiveness etc  denied  Denied SIB or self induced vomiting since previous session  Denied SI     A: Tim Strange presented with euthymic mood and constricted affect  Her father's recent Anniversary was a difficult time for her  This in addition to medical concerns and high stress classroom as a teacher, can explain more frequent mood fluctuations than normal      P: Continue individual therapy  Ongoing discussion regarding grief and relationships, self destructive behaviors  Pain Scale and Suicide Risk [facility]: Current Pain Assessment: no pain   Current suicide risk is low   Behavioral Health Treatment Plan [address] [place] [address] [address]: Diagnosis and Treatment Plan explained to patient, patient relates understanding diagnosis and is agreeable to Treatment Plan  Results/Data  PHQ-9 Adult Depression Screening 27GYM9037 05:45PM Theron Wei     Test Name Result Flag Reference   PHQ-9 Adult Depression Score 17     Over the last two weeks, how often have you been bothered by any of the following problems?   Little interest or pleasure in doing things: Nearly every day - 3  Feeling down, depressed, or hopeless: Nearly every day - 3  Trouble falling or staying asleep, or sleeping too much: Nearly every day - 3  Feeling tired or having little energy: Nearly every day - 3  Poor appetite or over eating: Not at all - 0  Feeling bad about yourself - or that you are a failure or have let yourself or your family down: More than half the days - 2  Trouble concentrating on things, such as reading the newspaper or watching television: More than half the days - 2  Moving or speaking so slowly that other people could have noticed  Or the opposite -  being so fidgety or restless that you have been moving around a lot more than usual: Not at all - 0  Thoughts that you would be better off dead, or of hurting yourself in some way: Several days - 1   PHQ-9 Adult Depression Screening Positive     PHQ-9 Difficulty Level Very difficult     PHQ-9 Severity      Moderately Severe Depression       Assessment    1  Self-injurious behavior (300 9) (F48 9)   2  Moderate depressive episode (296 22) (F32 1)   3   Mood disorder (296 90) (F39)    Signatures   Electronically signed by : Alexa Sparrow LCSW; Dec  5 2017  1:07PM EST                       (Author)

## 2018-01-23 NOTE — RESULT NOTES
Verified Results  (1) HERPES I/II ANTIBODIES, IGG 86NMS3292 18:77LA Daniel Quintanilla Order Number: [id number]     Test Name Result Flag Reference   HSV I BENIGNO IGG <0 91 index  0 00 - 0 90   Negative        <0 91                                   Equivocal 0 91 - 1 09                                   Positive        >1 09   Note: Negative indicates no antibodies detected to   HSV-1  Equivocal may suggest early infection  If   clinically appropriate, retest at later date  Positive   indicates antibodies detected to HSV-1    HSV II BENIGNO IGG <0 91 index  0 00 - 0 90   Negative        <0 91                                   Equivocal 0 91 - 1 09                                   Positive        >1 09   Note: Negative indicates no antibodies detected to   HSV-2  Equivocal may suggest early infection  If   clinically appropriate, retest at later date  Positive   indicates antibodies detected to HSV-2  Performed at:  [address] [place] [address]  [id number]  : Yolanda Villalobos MD, Phone:  [phone]       Plan  Exposure to herpes simplex virus (HSV)    · (1) HSV TYPING; Status:Active;  Requested IVW:93FED4508;

## 2018-01-23 NOTE — MISCELLANEOUS
Message     Recorded as Task   Date: 01/03/2018 02:45 PM, Created By: Angy Myles   Task Name: Follow Up   Assigned To: Magalycara Adair   Regarding Patient: Brice Walker, Status: Active   Comment:    Angy Rhein - 03 Jan 2018 2:45 PM     TASK CREATED  Herpes titers are negative  Would recommend repeat in 3 to 6 months due to recent exposure   Angy Myles - 03 Jan 2018 2:46 PM     TASK EDITED  order placed  Can be done in 6 wks actually  Cabrera Borrero - 56 Jan 2018 4:45 PM     TASK EDITED  Phone call to patient  Reviewed results herpes titer are negative  Recommend repeat can be done 6 wk do not wait longer than 6 months pt verbalized understanding  Active Problems    1  Abdominal pain (789 00) (R10 9)   2  Acute bacterial conjunctivitis of both eyes (372 03) (H10 33)   3  Adalimumab (Humira) long-term use (V58 69) (Z79 899)   4  Ankylosing spondylitis (720 0) (M45 9)   5  History of Birth Control   6  Cervical radiculopathy (723 4) (M54 12)   7  Concussion (850 9) (S06 0X9A)   8  Eating disorder, unspecified (307 50) (F50 9)   9  Encounter to discuss test results (V65 49) (Z71 89)   10  Exposure to herpes simplex virus (HSV) (V01 79) (Z20 828)   11  Extensor tendinitis of foot (727 06) (M77 50)   12  Fatigue (780 79) (R53 83)   13  Feeling tired (780 79) (R53 83)   14  Fibromyalgia (729 1) (M79 7)   15  SHIRLEY (generalized anxiety disorder) (300 02) (F41 1)   16  Headache, unspecified headache type (784 0) (R51)   17  Herpes simplex infection (054 9) (B00 9)   18  Methotrexate, long term, current use (V58 69) (Z79 899)   19  Microscopic hematuria (599 72) (R31 29)   20  Migraine headache (346 90) (G43 909)   21  Moderate episode of recurrent major depressive disorder (296 32) (F33 1)   22  Myofascial pain (729 1) (M79 1)   23  History of Need for HPV vaccination (V04 89) (Z23)   24   History of Need for prophylactic vaccination and inoculation against influenza (V04 81) (Z23)   25  Numbness and tingling (782 0) (R20 0,R20 2)   26  Paresthesias (782 0) (R20 2)   27  Psoriasis (696 1) (L40 9)   28  Psoriatic arthropathy (696 0) (L40 50)   29  PTSD (post-traumatic stress disorder) (309 81) (F43 10)   30  Rheumatoid arthritis of multiple sites with negative rheumatoid factor (714 0) (M06 09)   31  Right ankle pain (719 47) (M25 571)   32  Right ovarian cyst (620 2) (N83 201)   33  Screening for STD (sexually transmitted disease) (V74 5) (Z11 3)   34  Self-injurious behavior (300 9) (F48 9)   35  Sinus headache (784 0) (R51)   36  HORTENCIA (stress urinary incontinence, female) (625 6) (N39 3)   37  Vitamin D deficiency (268 9) (E55 9)   38  Well female exam with routine gynecological exam (V72 31) (Z01 419)    Current Meds   1  Azelastine HCl - 0 1 % Nasal Solution; USE 1 SPRAY IN EACH NOSTRIL TWICE DAILY; Therapy: 94LTR5560 to (Last Rx:21Nov2017)  Requested for: 21Nov2017 Ordered   2  FLUoxetine HCl - 20 MG Oral Capsule (PROzac); take 1 capsule daily  After 2 weeks,   increase to 2 capsules daily; Therapy: 77ZYO3365 to (Last Rx:64Isn2579)  Requested for: 01Xfk5268 Ordered   3  Lidocaine 5 % External Patch; 3 to affected area  12 hr on/ 12 hr off , max 3 per   day; Therapy: 90TKV4287 to (Evaluate:20Jan2018)  Requested for: 87Qqn7461; Last   Rx:61Zse2123 Ordered   4  Lyrica 75 MG Oral Capsule; TAKE 1 CAPSULE IN MORNING (IN ADDITION TO 75MG IN   AM AND 150MG IN PM); Therapy: 61BTU3594 to (Evaluate:27Mar2018); Last Rx:28Izk7800 Ordered   5  Lyrica 75 MG Oral Capsule; TAKE ONE CAPSULE IN MORNING AND TWO CAPSULES   IN EVENING; Therapy: 07OVW3944 to (Evaluate:68Ifl1818); Last Rx:21Nov2017 Ordered   6  Mirena (52 MG) 20 MCG/24HR Intrauterine Intrauterine Device; Therapy: (Recorded:10Aug2016) to Recorded   7  Prochlorperazine Maleate 10 MG Oral Tablet; take 1 tablet by mouth every 8 hours as   needed for nausea;    Therapy: 72TIV8839 to (Evaluate:04Phq8931)  Requested for: 75EME5117; Last   Rx:49Iez6803 Ordered   8  Rizatriptan Benzoate 10 MG Oral Tablet; TAKE 1 TABLET AT ONSET OF HEADACHE  MAY REPEAT EVERY 2 HOURS AS NEEDED  MAXIMUM 3 TABLETS IN 24 HOURS; Therapy: 83REH1205 to [phone])  Requested for: N3118818; Last   Rx:05Xhn7843; Status: ACTIVE - Renewal Denied Ordered   9  Topiramate 25 MG Oral Tablet; 2 po  bid; Therapy: 74RWJ1298 to (Evaluate:18Jun2018)  Requested for: 67Vrl6663; Last   Rx:92Iaz8070 Ordered   10  ValACYclovir HCl - 1 GM Oral Tablet; TAKE 1 TABLET EVERY 12 HOURS DAILY; Therapy: 43OAN3450 to ([phone])  Requested for: 03Hsf0401; Last    Rx:37Qrr8291 Ordered   11  ValACYclovir HCl - 500 MG Oral Tablet; TAKE 1 TABLET DAILY; Therapy: 70KWT5631 to ([facility])  Requested for: 58TWQ9526; Last    Rx:02Jan2018 Ordered    Allergies    1  No Known Drug Allergies    2   Other    Signatures   Electronically signed by : Shefali Gill, ; Clement  3 2018  4:46PM EST                       (Author)

## 2018-01-23 NOTE — MISCELLANEOUS
Message   Recorded as Task   Date: 12/27/2017 04:27 PM, Created By: Kishan Hidalgo   Task Name: Call Back   Assigned To: Ton Martines   Regarding Patient: Gilbert Masters, Status: Active   Comment:    Kishan Hidalgo - 27 Dec 2017 4:27 PM     TASK CREATED  Caller: Self; General Medical Question; [phone] (Home); [phone] (Work)  Pt  thinks she might have herpes  her gyyn can't see her for awhile  Would you be able to treat her for that and do STD testing? Call pt  at [phone]     Mer Kitchen - 27 Dec 2017 5:40 PM     TASK REASSIGNED: Previously Assigned To [place] [address] - 27 Dec 2017 5:50 PM     TASK REPLIED TO: Previously Assigned To [facility],Team  she should be seen by her Gyn    should call her Gyn for a sick visit/urgent appt & they should be able to squeeze her in for an appt   Ton Martines - 27 Dec 2017 7:57 PM     TASK REASSIGNED: Previously Assigned To [facility],Team  I called and spoke to patient    see provider telephone encounter from today for details   called and spoke to patient    Delmis Singleton suspected she had herpes one time in past and was seen but was not herpes at that time  she is concerned she has herpes sx again, called her Gyn and could not get in for an expedited appt    plan - advised to go to /walk-in care for eval today or call her Gyn tmrw in AM for expedited appt/sick visit      Plan  Fibromyalgia    · Lyrica 75 MG Oral Capsule; TAKE 1 CAPSULE IN MORNING (IN ADDITION TO  75MG IN AM AND 150MG IN PM)    Signatures   Electronically signed by Sylvie Montana DO; Dec 27 2017  8:00PM EST                       (Author)

## 2018-01-23 NOTE — PSYCH
Progress Note  Psychotherapy Provided [facility]: Individual Psychotherapy 50 minutes provided today  Goals addressed in session:   Addressed goal 1 - 3 of initial plan    D: Met with Catalina ALATORRE; 'really tired lately ' Session focused upon visit with Dr Naz Waters; feels comfortable and this will help her, relationship with Beny Jiang; connection remains strong, spending !;! time together despite how she has been feeling health wise and school; finals complete and does not return to late January  Reassessment of treatment plan completed  Denied SI, SIB etc     A: Sushant Royal presented with brighter affect and mood this session  Stress of school is over for a period of time  Neuropathy in shoulder remains a struggle  Demonstrated avoidance while discussing treatment plan on grief  P: Continue individual therapy  Ongoing discussion regarding grief and relationships          Pain Scale and Suicide Risk [facility]: Current Pain Assessment: moderate to severe   On a scale of 0 to 10, the patient rates current pain at 6   Current suicide risk is low   Behavioral Health Treatment Plan [address] [place] [address] [address]: Diagnosis and Treatment Plan explained to patient, patient relates understanding diagnosis and is agreeable to Treatment Plan  Results/Data  PHQ-9 Adult Depression Screening 40Raj9554 05:48PM Simran Yancey     Test Name Result Flag Reference   PHQ-9 Adult Depression Score 9     Over the last two weeks, how often have you been bothered by any of the following problems?   Little interest or pleasure in doing things: More than half the days - 2  Feeling down, depressed, or hopeless: Several days - 1  Trouble falling or staying asleep, or sleeping too much: More than half the days - 2  Feeling tired or having little energy: More than half the days - 2  Poor appetite or over eating: Not at all - 0  Feeling bad about yourself - or that you are a failure or have let yourself or your family down: Several days - 1  Trouble concentrating on things, such as reading the newspaper or watching television: Several days - 1  Moving or speaking so slowly that other people could have noticed  Or the opposite -  being so fidgety or restless that you have been moving around a lot more than usual: Not at all - 0  Thoughts that you would be better off dead, or of hurting yourself in some way: Not at all - 0   PHQ-9 Adult Depression Screening Negative     PHQ-9 Difficulty Level Somewhat difficult     PHQ-9 Severity Mild Depression         Assessment    1  Eating disorder, unspecified (307 50) (F50 9)   2  SHIRLEY (generalized anxiety disorder) (300 02) (F41 1)   3   Moderate episode of recurrent major depressive disorder (296 32) (F33 1)    Signatures   Electronically signed by : Adamaris Briscoe LCSW; Dec 20 2017 10:55AM EST                       (Author)
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
## 2018-01-24 VITALS
TEMPERATURE: 97.9 F | WEIGHT: 182.38 LBS | DIASTOLIC BLOOD PRESSURE: 66 MMHG | HEART RATE: 70 BPM | SYSTOLIC BLOOD PRESSURE: 112 MMHG | BODY MASS INDEX: 31.14 KG/M2 | OXYGEN SATURATION: 99 % | RESPIRATION RATE: 18 BRPM | HEIGHT: 64 IN

## 2018-01-24 VITALS
RESPIRATION RATE: 16 BRPM | SYSTOLIC BLOOD PRESSURE: 118 MMHG | HEART RATE: 84 BPM | HEIGHT: 64 IN | BODY MASS INDEX: 31.58 KG/M2 | DIASTOLIC BLOOD PRESSURE: 80 MMHG | WEIGHT: 185 LBS

## 2018-01-25 ENCOUNTER — TELEPHONE (OUTPATIENT)
Dept: NEUROLOGY | Facility: CLINIC | Age: 28
End: 2018-01-25

## 2018-01-25 NOTE — TELEPHONE ENCOUNTER
[facility] left message requesting refill of lidocaine patches  per your 11/3 note, pt use lidoderm cream instead of patches  No mention in MK's 1/15 note  Please advise if ok to refill

## 2018-01-25 NOTE — TELEPHONE ENCOUNTER
The patches could work better but it may not be covered by insurance    We can fill but if it needs a prior auth , no need to fill and she can go back to using the cream

## 2018-01-27 DIAGNOSIS — M79.2 NEUROPATHIC PAIN: Primary | ICD-10-CM

## 2018-01-27 RX ORDER — LIDOCAINE 50 MG/G
3 PATCH TOPICAL DAILY
Qty: 90 PATCH | Refills: 5 | Status: SHIPPED | OUTPATIENT
Start: 2018-01-27 | End: 2018-08-14 | Stop reason: SDUPTHER

## 2018-02-02 ENCOUNTER — PROCEDURE VISIT (OUTPATIENT)
Dept: PAIN MEDICINE | Facility: CLINIC | Age: 28
End: 2018-02-02
Payer: OTHER MISCELLANEOUS

## 2018-02-02 DIAGNOSIS — G89.29 CHRONIC RIGHT SHOULDER PAIN: Primary | ICD-10-CM

## 2018-02-02 DIAGNOSIS — M25.511 CHRONIC RIGHT SHOULDER PAIN: Primary | ICD-10-CM

## 2018-02-02 PROCEDURE — 20611 DRAIN/INJ JOINT/BURSA W/US: CPT | Performed by: ANESTHESIOLOGY

## 2018-02-05 ENCOUNTER — OFFICE VISIT (OUTPATIENT)
Dept: BEHAVIORAL/MENTAL HEALTH CLINIC | Facility: CLINIC | Age: 28
End: 2018-02-05
Payer: COMMERCIAL

## 2018-02-05 DIAGNOSIS — F33.1 MAJOR DEPRESSIVE DISORDER, RECURRENT, MODERATE (HCC): Primary | ICD-10-CM

## 2018-02-05 DIAGNOSIS — F50.9 EATING DISORDER: ICD-10-CM

## 2018-02-05 DIAGNOSIS — Z72.89 SELF-INJURIOUS BEHAVIOR: ICD-10-CM

## 2018-02-05 DIAGNOSIS — F43.10 PTSD (POST-TRAUMATIC STRESS DISORDER): ICD-10-CM

## 2018-02-05 DIAGNOSIS — F41.1 GAD (GENERALIZED ANXIETY DISORDER): ICD-10-CM

## 2018-02-05 PROCEDURE — 90834 PSYTX W PT 45 MINUTES: CPT | Performed by: SOCIAL WORKER

## 2018-02-05 NOTE — PSYCH
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  Woody Herzog disclosed she has been biting herself on two occassions since last session  Practiced mindfulness skills prior to end of session  Denied  SI      A: Woody Herzog presented with  depressed mood and constricted affect  She has been experiencing PTSD symptoms and this has triggered feelings of shame and decreased self confidence  Demonstrating progress in that she is beginning to discuss trauma in greater detail        P: Continue individual therapy  Ongoing discussion regarding trauma,  Assess PTSD symptoms and self injury  Behavioral Health Treatment Plan [facility]: Diagnosis and Treatment Plan explained to Samaria Parnell relates understanding diagnosis and is agreeable to Treatment Plan   Yes

## 2018-02-12 ENCOUNTER — OFFICE VISIT (OUTPATIENT)
Dept: PSYCHIATRY | Facility: CLINIC | Age: 28
End: 2018-02-12
Payer: COMMERCIAL

## 2018-02-12 VITALS
BODY MASS INDEX: 30.73 KG/M2 | WEIGHT: 179 LBS | DIASTOLIC BLOOD PRESSURE: 73 MMHG | SYSTOLIC BLOOD PRESSURE: 106 MMHG | HEART RATE: 69 BPM

## 2018-02-12 DIAGNOSIS — Z72.89 SELF-INJURIOUS BEHAVIOR: ICD-10-CM

## 2018-02-12 DIAGNOSIS — F33.1 MODERATE EPISODE OF RECURRENT MAJOR DEPRESSIVE DISORDER (HCC): Primary | ICD-10-CM

## 2018-02-12 DIAGNOSIS — F50.9 EATING DISORDER: ICD-10-CM

## 2018-02-12 DIAGNOSIS — F43.10 PTSD (POST-TRAUMATIC STRESS DISORDER): ICD-10-CM

## 2018-02-12 DIAGNOSIS — F41.1 GAD (GENERALIZED ANXIETY DISORDER): ICD-10-CM

## 2018-02-12 PROBLEM — R10.9 ABDOMINAL PAIN: Status: ACTIVE | Noted: 2017-08-10

## 2018-02-12 PROBLEM — H10.33 ACUTE BACTERIAL CONJUNCTIVITIS OF BOTH EYES: Status: ACTIVE | Noted: 2017-10-20

## 2018-02-12 PROBLEM — S06.0X9A CONCUSSION: Status: ACTIVE | Noted: 2017-07-26

## 2018-02-12 PROBLEM — B00.9 HERPES SIMPLEX INFECTION: Status: ACTIVE | Noted: 2017-12-28

## 2018-02-12 PROBLEM — M54.12 CERVICAL RADICULOPATHY: Status: ACTIVE | Noted: 2017-07-24

## 2018-02-12 PROBLEM — N39.3 SUI (STRESS URINARY INCONTINENCE, FEMALE): Status: ACTIVE | Noted: 2017-07-20

## 2018-02-12 PROCEDURE — 99214 OFFICE O/P EST MOD 30 MIN: CPT | Performed by: PSYCHIATRY & NEUROLOGY

## 2018-02-12 RX ORDER — RIZATRIPTAN BENZOATE 10 MG/1
1 TABLET ORAL
COMMUNITY
Start: 2017-09-29 | End: 2018-05-15 | Stop reason: SDUPTHER

## 2018-02-12 RX ORDER — VALACYCLOVIR HYDROCHLORIDE 500 MG/1
500 TABLET, FILM COATED ORAL DAILY PRN
COMMUNITY
End: 2018-09-18

## 2018-02-12 RX ORDER — FLUOXETINE HYDROCHLORIDE 20 MG/1
CAPSULE ORAL
COMMUNITY
Start: 2017-12-11 | End: 2018-02-12 | Stop reason: SDUPTHER

## 2018-02-12 RX ORDER — FLUOXETINE HYDROCHLORIDE 40 MG/1
CAPSULE ORAL
Qty: 60 CAPSULE | Refills: 2 | Status: SHIPPED | OUTPATIENT
Start: 2018-02-12 | End: 2018-03-14
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
## 2018-02-13 ENCOUNTER — TELEPHONE (OUTPATIENT)
Dept: OBGYN CLINIC | Facility: HOSPITAL | Age: 28
End: 2018-02-13

## 2018-02-14 ENCOUNTER — TELEPHONE (OUTPATIENT)
Dept: PAIN MEDICINE | Facility: MEDICAL CENTER | Age: 28
End: 2018-02-14

## 2018-02-14 NOTE — TELEPHONE ENCOUNTER
Pt called back stating she received message that excuse note was faxed to her but she never received fax  Fax # verified  Please refax

## 2018-02-14 NOTE — TELEPHONE ENCOUNTER
[address] [facility]- patient called stating a letter was supposed to be faxed to [phone]   stated she is not sure if the letter was sent   but she has yet to rec'e letter   Any questions c/b [phone]

## 2018-02-14 NOTE — TELEPHONE ENCOUNTER
Left vm on number provided that the letter was faxed yest   Ivette Hancock confirmed that the letter was faxed on 2/13/18 )    Any questions pt is to call the office, office # and OH provided

## 2018-02-15 NOTE — TELEPHONE ENCOUNTER
Letter was first faxed on 2/13/18 by Geetha Gonzales and we have a confirmation paper that fax went through  At the pt's request I attempted to re-fax to the fax # provided in the task below  I attempted to fax and a voice came on saying fax couldn't be received  I went to another fax in Pat's office to re-fax, again fax would not go through, an answering machine type of message came on at end  Geetha Gonzales has sent an email to pt that was obtained through her Epic profile letting her know the fax won't go through to the fax # she provided  I also left vm on number provided informing pt that fax won't go through and to pls c/b  C/B # and OH provided

## 2018-02-15 NOTE — TELEPHONE ENCOUNTER
Ambar attempted to fax letter again and again it didn't go through so Ambar left a vm for pt that she would mail the letter to pt's home   Letter mailed to pt's home by Ambar SQUIRES

## 2018-02-19 ENCOUNTER — OFFICE VISIT (OUTPATIENT)
Dept: BEHAVIORAL/MENTAL HEALTH CLINIC | Facility: CLINIC | Age: 28
End: 2018-02-19
Payer: COMMERCIAL

## 2018-02-19 ENCOUNTER — TELEPHONE (OUTPATIENT)
Dept: OBGYN CLINIC | Facility: CLINIC | Age: 28
End: 2018-02-19

## 2018-02-19 ENCOUNTER — TELEPHONE (OUTPATIENT)
Dept: PSYCHIATRY | Facility: CLINIC | Age: 28
End: 2018-02-19

## 2018-02-19 DIAGNOSIS — F43.10 PTSD (POST-TRAUMATIC STRESS DISORDER): Primary | ICD-10-CM

## 2018-02-19 DIAGNOSIS — F33.1 MODERATE EPISODE OF RECURRENT MAJOR DEPRESSIVE DISORDER (HCC): Primary | ICD-10-CM

## 2018-02-19 DIAGNOSIS — Z72.89 SELF-INJURIOUS BEHAVIOR: ICD-10-CM

## 2018-02-19 DIAGNOSIS — F43.10 PTSD (POST-TRAUMATIC STRESS DISORDER): ICD-10-CM

## 2018-02-19 DIAGNOSIS — F50.9 EATING DISORDER: ICD-10-CM

## 2018-02-19 PROCEDURE — 90834 PSYTX W PT 45 MINUTES: CPT | Performed by: SOCIAL WORKER

## 2018-02-19 RX ORDER — PRAZOSIN HYDROCHLORIDE 1 MG/1
CAPSULE ORAL
Qty: 60 CAPSULE | Refills: 2 | Status: SHIPPED | OUTPATIENT
Start: 2018-02-19 | End: 2018-04-03 | Stop reason: SDUPTHER

## 2018-02-19 NOTE — TELEPHONE ENCOUNTER
----- Message from Oneta Agent sent at 2/19/2018 12:10 PM EST -----  Hi Dr Evelia Ventura would like to take the Prazosin  Nightmares continue to be surrounded by death and very vivid     Ashley Soni
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
## 2018-02-19 NOTE — TELEPHONE ENCOUNTER
[facility], asked that she call back when she is availalbe

## 2018-02-19 NOTE — TELEPHONE ENCOUNTER
No issues with increase with prozac  Would like to start prazosin  Nightmares intensified with therapy, triggers  P: Prazosin 1mg HS x1wk, then may increase to 2mg HS  PARQ including ortho hypotension, falls, sedation, confusion, dizziness, headaches, drug interactions, and others

## 2018-02-20 DIAGNOSIS — N89.8 VAGINAL LESION: Primary | ICD-10-CM

## 2018-02-20 NOTE — TELEPHONE ENCOUNTER
Pt called asking if something else can be added to the letter  States that they want to know a specific time frame that she will be under the doctor's care  How long she should be off of her work duties, etc  Patient also has a different fax number that the letter can be sent to, because there were issues last time  New fax is [phone], Attn: Desmond Francois  Requesting a call back at [phone]

## 2018-02-21 NOTE — TELEPHONE ENCOUNTER
Will address ongoing issues with her care at her [facility] in March   She can tell her job that she will be under our care until she gets better

## 2018-02-21 NOTE — TELEPHONE ENCOUNTER
Please advise  Abilio Galarza found a copy of the original letter you wrote if you need to review it  I see pt has pending ov on 3/7/18 with you

## 2018-02-22 NOTE — TELEPHONE ENCOUNTER
S/w the patient this am and she stated he work is requesting a modification on the note that you already gave her stating that she has restrictions and continues to be under our care until she feels better? She is requesting that we refax it to the school number she gave us previously  Her next office visit is on 3/7/18 at 36 c/ you for F/U  Please advise  Thanks

## 2018-02-23 ENCOUNTER — TELEPHONE (OUTPATIENT)
Dept: NEUROLOGY | Facility: CLINIC | Age: 28
End: 2018-02-23
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
## 2018-02-23 NOTE — TELEPHONE ENCOUNTER
That is awful  Yes this injury could worsen her headaches and she should come see me earlier if possible  If not I would recommend increasing trokendi to 200 mg if that is helpful/ or if she is still on it  Can offer steroid or depakote qhs daily for a few days to break cycle  Thanks

## 2018-02-26 ENCOUNTER — OFFICE VISIT (OUTPATIENT)
Dept: BEHAVIORAL/MENTAL HEALTH CLINIC | Facility: CLINIC | Age: 28
End: 2018-02-26
Payer: COMMERCIAL

## 2018-02-26 DIAGNOSIS — F41.1 GAD (GENERALIZED ANXIETY DISORDER): ICD-10-CM

## 2018-02-26 DIAGNOSIS — Z72.89 SELF-INJURIOUS BEHAVIOR: ICD-10-CM

## 2018-02-26 DIAGNOSIS — F50.9 EATING DISORDER: Primary | ICD-10-CM

## 2018-02-26 DIAGNOSIS — F43.10 PTSD (POST-TRAUMATIC STRESS DISORDER): ICD-10-CM

## 2018-02-26 DIAGNOSIS — F33.1 MODERATE EPISODE OF RECURRENT MAJOR DEPRESSIVE DISORDER (HCC): ICD-10-CM

## 2018-02-26 PROCEDURE — 90834 PSYTX W PT 45 MINUTES: CPT | Performed by: SOCIAL WORKER

## 2018-02-26 NOTE — PSYCH
Psychotherapy Provided: Individual Psychotherapy 50 minutes     Length of time in session: 50 minutes, follow up in 2 week    Goals addressed in session: Goal 1, Goal 2 and Goal 3      Pain:      moderate to severe    5    Current suicide risk : Low     D: Met with Catalina ALATORRE; 'I'm having a really hard time  '  Fleeting SI (thinking about death itself) without plan  Session focused upon PTSD symptoms as evidenced by hyperviligence, tearfulness, 'living in frpanic  Primary triggers include father's death, Giovanna Smack and their personal relationship  Self injury by jamming nails into hand and biting her lip  Denied vomiting  Practiced mindfulness skills prior to end of session  Denied  SI      A: Omid Rowley presented with  depressed mood and tearful affect   She has been experiencing PTSD symptoms and this has triggered feelings of shame and self blame    Demonstrating progress in that she is allowing herself to be vulnerable to express her needs both with Cosrishi and in session           P: Continue individual therapy  Ongoing discussion regarding trauma,  Assess PTSD symptoms and self injury  Behavioral Health Treatment Plan [facility]: Diagnosis and Treatment Plan explained to Bobby Rodriguez relates understanding diagnosis and is agreeable to Treatment Plan   Yes

## 2018-03-02 NOTE — TELEPHONE ENCOUNTER
Pulsating HA have subsided @this time    Pt is currently taking trokendi 150mg Qday, advised recommended increase, pt is agreeable    Pt declines steroid/depakote @this time    There are no available appts to offer pt until 3/28 (pt's current appt is 3/29) should she keep her appt as is or do you want to put her somewhere else    Please advise

## 2018-03-05 ENCOUNTER — OFFICE VISIT (OUTPATIENT)
Dept: BEHAVIORAL/MENTAL HEALTH CLINIC | Facility: CLINIC | Age: 28
End: 2018-03-05
Payer: COMMERCIAL

## 2018-03-05 DIAGNOSIS — F41.1 GAD (GENERALIZED ANXIETY DISORDER): ICD-10-CM

## 2018-03-05 DIAGNOSIS — Z72.89 SELF-INJURIOUS BEHAVIOR: ICD-10-CM

## 2018-03-05 DIAGNOSIS — F43.10 PTSD (POST-TRAUMATIC STRESS DISORDER): Primary | ICD-10-CM

## 2018-03-05 DIAGNOSIS — F33.1 MODERATE EPISODE OF RECURRENT MAJOR DEPRESSIVE DISORDER (HCC): ICD-10-CM

## 2018-03-05 PROCEDURE — 90834 PSYTX W PT 45 MINUTES: CPT | Performed by: SOCIAL WORKER

## 2018-03-05 NOTE — PSYCH
Psychotherapy Provided: Individual Psychotherapy 50 minutes     Length of time in session: 50 minutes, follow up in 2 week    Goals addressed in session: Goal 2 and Goal 3      Pain:      none    0    Current suicide risk : Low     D: Met with Catalina ALATORRE; 'I'm having a really hard time  '  Denied Si  Session focused upon Yusef moving back in at home to take a break from Inessa Hurtado  Feels he is a 'trigger'   Discussed frequent mood swings ranging from 'melt downs' to anger 'snapping' and other times 'feeling really sad' with tears coming down her face  Irritable and anxious as well  Feelings overwhelmed with work, school, medical issues and negative self judgements for feeling this way  Utilizing coping skills and expressed this has been helpful  Denied nightmares  SIB on 3 occassions (digging nails in hand)  Denied purging though urges are there at times  A: Yusef presented with anxious mood and incongruent affect  Yusef often begins to laugh and  herself when she begins to experience emotion during session  She calls it 'putting on a show' as not to look 'weak'          P: Continue individual therapy  Ongoing discussion regarding symptoms, stressors, relationship, medical concerns    [address]: Diagnosis and Treatment Plan explained to Ayla Cheng relates understanding diagnosis and is agreeable to Treatment Plan   Yes

## 2018-03-06 ENCOUNTER — TELEPHONE (OUTPATIENT)
Dept: OBGYN CLINIC | Facility: HOSPITAL | Age: 28
End: 2018-03-06

## 2018-03-06 ENCOUNTER — TELEPHONE (OUTPATIENT)
Dept: PAIN MEDICINE | Facility: MEDICAL CENTER | Age: 28
End: 2018-03-06

## 2018-03-06 DIAGNOSIS — F43.10 PTSD (POST-TRAUMATIC STRESS DISORDER): Primary | ICD-10-CM

## 2018-03-06 RX ORDER — FLUOXETINE HYDROCHLORIDE 20 MG/1
CAPSULE ORAL
Qty: 60 CAPSULE | Refills: 2 | Status: SHIPPED | OUTPATIENT
Start: 2018-03-06 | End: 2018-03-14

## 2018-03-06 NOTE — TELEPHONE ENCOUNTER
Patients mom is calling stating that she will be keeping the appt for next Wednesday at 1245      kaveh pt

## 2018-03-06 NOTE — TELEPHONE ENCOUNTER
[address] [facility]- patient called to r/s her appt  Only wanted to be seen by Q06  Patient r/s 04/11/18  Patient stated she needs a letter of accomodation sent to her job   C/b [phone]

## 2018-03-08 NOTE — TELEPHONE ENCOUNTER
S/W pt who said her appt for yest 3/7 was cx'd and has now been R/S to 3/14/18 w/ FQ   Pt said she will have this addressed at her ov next week

## 2018-03-14 ENCOUNTER — OFFICE VISIT (OUTPATIENT)
Dept: PAIN MEDICINE | Facility: CLINIC | Age: 28
End: 2018-03-14
Payer: OTHER MISCELLANEOUS

## 2018-03-14 VITALS
TEMPERATURE: 98.1 F | WEIGHT: 176 LBS | DIASTOLIC BLOOD PRESSURE: 72 MMHG | BODY MASS INDEX: 30.05 KG/M2 | HEART RATE: 82 BPM | HEIGHT: 64 IN | SYSTOLIC BLOOD PRESSURE: 106 MMHG

## 2018-03-14 DIAGNOSIS — M77.8 RIGHT SHOULDER TENDONITIS: Primary | ICD-10-CM

## 2018-03-14 PROCEDURE — 99214 OFFICE O/P EST MOD 30 MIN: CPT | Performed by: ANESTHESIOLOGY

## 2018-03-14 RX ORDER — FLUOXETINE HYDROCHLORIDE 90 MG/1
80 CAPSULE, DELAYED RELEASE PELLETS ORAL
COMMUNITY
End: 2018-04-03

## 2018-03-14 NOTE — LETTER
March 14, 2018     Patient: Yvonne Deluna   YOB: 1990   Date of Visit: 3/14/2018       To Whom it May Concern:    Yvonne Deluna is under my professional care  She was seen in my office on 3/14/2018  She may return to work but please continue to avoid TACT 2 physical restraints for the next 3 months until she is re-evaluated  If you have any questions or concerns, please don't hesitate to call           Sincerely,          Rhina Carranza MD        CC: No Recipients

## 2018-03-14 NOTE — PROGRESS NOTES
Assessment:  1  Right shoulder tendonitis        Plan:  Unfortunately, the patient had a setback with the re-injury at work  At this time, I have renewed her work restrictions and I would like her to do another round of physical therapy which she was amenable to  She will follow back up in 3 months with our nurse practitioner for re-evaluation  If symptoms worsen in the interim, I advised her that she can contact the office and we can repeat the shoulder injection  My impressions and treatment recommendations were discussed in detail with the patient who verbalized understanding and had no further questions  Discharge instructions were provided  I personally saw and examined the patient and I agree with the above discussed plan of care  Orders Placed This Encounter   Procedures    Ambulatory referral to Physical Therapy     Standing Status:   Future     Standing Expiration Date:   9/14/2018     Referral Priority:   Routine     Referral Type:   Physical Therapy     Referral Reason:   Specialty Services Required     Requested Specialty:   Physical Therapy     Number of Visits Requested:   1     Expiration Date:   3/14/2019     New Medications Ordered This Visit   Medications    FLUoxetine (PROzac) 90 MG DR capsule     Sig: Take 90 mg by mouth every 7 days       History of Present Illness:  Larry Vallejo is a 33 y o  female who presents for a follow up office visit in regards to Shoulder Pain (right)  The patient has a history of right shoulder tendinitis and was previously seen last month for a right shoulder injection  She reports 1 week of relief following the procedure but then 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  She continues with constant pain in the right shoulder described to be burning, sharp, throbbing, shooting with numbness and paresthesias       I have personally reviewed and/or updated the patient's past medical history, past surgical history, family history, social history, current medications, allergies, and vital signs today  Review of Systems   Respiratory: Negative for shortness of breath  Cardiovascular: Negative for chest pain  Gastrointestinal: Negative for constipation, diarrhea, nausea and vomiting  Musculoskeletal: Positive for joint swelling  Negative for arthralgias, gait problem and myalgias  Skin: Negative for rash  Neurological: Negative for dizziness, seizures and weakness  All other systems reviewed and are negative  Patient Active Problem List   Diagnosis    Neuropathic pain    Right shoulder pain    Moderate episode of recurrent major depressive disorder ([facility])    Eating disorder    Self-injurious behavior    PTSD (post-traumatic stress disorder)    SHIRLEY (generalized anxiety disorder)    Abdominal pain    Acute bacterial conjunctivitis of both eyes    Ankylosing spondylitis ([facility])    Cervical radiculopathy    Chronic migraine    Concussion    Extensor tendinitis of foot    Fatigue    Fibromyalgia    Herpes simplex infection    Microscopic hematuria    Myofascial pain    Numbness and tingling    Rheumatoid arthritis of multiple sites with negative rheumatoid factor ([facility] )    Right ovarian cyst    HORTENCIA (stress urinary incontinence, female)    Vitamin D deficiency       Past Medical History:   Diagnosis Date    [facility]     Depression     Fibromyalgia        Past Surgical History:   Procedure Laterality Date    MANDIBLE SURGERY      REDUCTION MAMMAPLASTY         No family history on file  Social History     Occupational History    Not on file       Social History Main Topics    Smoking status: Never Smoker    Smokeless tobacco: Not on file    Alcohol use No    Drug use: No    Sexual activity: Not on file       Current Outpatient Prescriptions on File Prior to Visit   Medication Sig    ergocalciferol (VITAMIN D2) 50,000 units Take 50,000 Units by mouth once a week  levonorgestrel (MIRENA) 20 MCG/24HR IUD 1 each by Intrauterine route once    lidocaine (LIDODERM) 5 % Place 3 patches on the skin daily Remove & Discard patch within 12 hours or as directed by Md  Can use up to 3 patches daily    prazosin (MINIPRESS) 1 mg capsule Take 1mg nightly  After 1 week may increase to 2mg nightly   pregabalin (LYRICA) 150 mg capsule Take 50 mg by mouth 2 (two) times a day    rizatriptan (MAXALT) 10 MG tablet Take 1 tablet by mouth    Tofacitinib Citrate (XELJANZ XR PO) Take by mouth    Topiramate ER (TROKENDI XR) 100 MG CP24 Take 150 mg by mouth    valACYclovir (VALTREX) 500 mg tablet Take 500 mg by mouth daily as needed    [DISCONTINUED] FLUoxetine (PROzac) 20 mg capsule TAKE 1 CAPSULE DAILY  AFTER 2 WEEKS, INCREASE TO 2 CAPSULES DAILY    [DISCONTINUED] FLUoxetine (PROzac) 40 MG capsule Take 60mg (40mg and 20mg capsule) daily  For 2 weeks, then increase to 80mg daily   [DISCONTINUED] ibuprofen (MOTRIN) 800 mg tablet Take 1 tablet by mouth every 8 (eight) hours as needed for mild pain or moderate pain (take with food) for up to 10 days     No current facility-administered medications on file prior to visit  No Known Allergies    Physical Exam:    /72   Pulse 82   Temp 98 1 °F (36 7 °C) (Oral)   Ht 5' 4" (1 626 m)   Wt 79 8 kg (176 lb)   BMI 30 21 kg/m²     Constitutional:normal, well developed, well nourished, alert, in no distress and non-toxic and no overt pain behavior    Eyes:anicteric  HEENT:grossly intact  Neck:supple, symmetric, trachea midline and no masses   Pulmonary:even and unlabored  Cardiovascular:No edema or pitting edema present  Skin:Normal without rashes or lesions and well hydrated  Psychiatric:Mood and affect appropriate  Neurologic:Cranial Nerves II-XII grossly intact  Musculoskeletal:normal     Shoulder Exam  Appearance:  Normal with no swelling or bruising and no obvious joint deformity  Palpation/Tenderness:  Right bicipital tenderness  Active Range of Motion:  Flexion: No limitation, Extension: No limitation, Abduction: No limitation, External Rotation: No limitation and Internal Rotation: No limitation  Special Tests:  Empty Can Test:  positive    Imaging    MRI RIGHT SHOULDER (1/15/2018)     INDICATION:  S49  91XA: Unspecified injury of right shoulder and upper arm, initial encounter  History taken directly from the electronic ordering system  Shoulder injury and pain     COMPARISON:  None      TECHNIQUE:   The following MR sequences were obtained of the right shoulder: Localizer, axial GRE/PD fat sat, oblique coronal T2 fat sat, oblique sagittal T1/T2 fat sat  Images were acquired on a 1 5 Annalise unit  Gadolinium was not used      FINDINGS:     SUBCUTANEOUS TISSUES: Normal     JOINT EFFUSION: None      ACROMION PROCESS: Normal      ROTATOR CUFF: Supraspinatus and infraspinatus insertional tendinosis with an anterior leading edge undersurface supraspinatus tendon tear measuring up to 10 mm in transverse dimension with tiny full-thickness component anteriorly    No significant tendon   retraction or fatty muscular infiltration      SUBACROMIAL/SUBDELTOID BURSA: Minimal bursal fluid evident       LONG HEAD OF BICEPS TENDON: There is mild tendinosis without tear      GLENOID LABRUM: Intact      GLENOHUMERAL JOINT: Intact      ACROMIOCLAVICULAR JOINT:  Normal      BONE MARROW SIGNAL: Normal

## 2018-03-15 ENCOUNTER — TELEPHONE (OUTPATIENT)
Dept: OBGYN CLINIC | Facility: HOSPITAL | Age: 28
End: 2018-03-15

## 2018-03-15 NOTE — TELEPHONE ENCOUNTER
Caller: patient  Call back number: [phone]  Fax number: [phone] attention to patient    Patient called stating a letter of accommodation was written  She states her employer wants it to state that she will not do TACT2 instead of she will avoid it   Please fax

## 2018-03-16 NOTE — TELEPHONE ENCOUNTER
Please advise, pt requesting a new work note stating that "she will not do TACT12" instead of the current wording saying she will "avoid" it

## 2018-03-26 ENCOUNTER — EVALUATION (OUTPATIENT)
Dept: PHYSICAL THERAPY | Facility: CLINIC | Age: 28
End: 2018-03-26
Payer: OTHER MISCELLANEOUS

## 2018-03-26 DIAGNOSIS — M77.8 RIGHT SHOULDER TENDONITIS: Primary | ICD-10-CM

## 2018-03-26 PROCEDURE — G8991 OTHER PT/OT GOAL STATUS: HCPCS | Performed by: PHYSICAL THERAPIST

## 2018-03-26 PROCEDURE — G8990 OTHER PT/OT CURRENT STATUS: HCPCS | Performed by: PHYSICAL THERAPIST

## 2018-03-26 PROCEDURE — 97110 THERAPEUTIC EXERCISES: CPT | Performed by: PHYSICAL THERAPIST

## 2018-03-26 PROCEDURE — 97162 PT EVAL MOD COMPLEX 30 MIN: CPT | Performed by: PHYSICAL THERAPIST

## 2018-03-26 NOTE — PROGRESS NOTES
PT Evaluation     Today's date: 3/26/2018  Patient name: Rashard Morris  : 1990  MRN: [id number]  Referring provider: Oliver Mohan MD  Dx:   Encounter Diagnosis     ICD-10-CM    1  Right shoulder tendonitis M75 [id number] Ambulatory referral to Physical Therapy                  Assessment  Impairments: abnormal or restricted ROM, impaired physical strength and pain with function    Assessment details: Patient presents with signs and symptoms consistent with referring diagnosis  Positive prognostic indicators include: Motivated to improve Negative prognostic indicators include: long standing pain, prior therapy did not resolve pain  She presents with: pain, decreased: ROM, strength, hypomobility and  functional capacity  She has consistent thoracic tightness as well as cx soft tissue restrictions  She requires skilled PT to address these deficits and restore maximal functional capacity  Thank you for this pleasant referral        Understanding of Dx/Px/POC: good   Prognosis: good    Goals  ST-6 weeks  1  Patient to be independent with HEP  2   Decrease pain at least 2 subjective levels  LT-12 weeks  1    Patient to voice comfort with self management of condition  2   75% or > decreased pain  3   75% or > decreased functional deficits  4   Normalize AROM of all deficit planes  5   Normalize strength  6   Functional Status Score:   7  Patient to voice understanding of activities/positions to avoid        Plan  Patient would benefit from: skilled PT  Referral necessary: No  Planned modality interventions: cryotherapy  Planned therapy interventions: IADL retraining, joint mobilization, manual therapy, motor coordination training, neuromuscular re-education, patient education, postural training, self care, strengthening, stretching, therapeutic activities, therapeutic exercise, home exercise program, flexibility, ADL training, balance and body mechanics training  Frequency: 2x week  Duration in weeks: 6  Treatment plan discussed with: patient        Subjective Evaluation    History of Present Illness  Date of onset: 2016  Mechanism of injury: Chief Complaint:  R scapular pain    History:Pt initially 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  She works as an autistic support aide  Past treatment consisted of therapy at this facility without full resolution, injections and medication  She had an MRI demonstrating minor supra/infraspinatus tearing and tendonosis  Sympoms have been unchanged recently  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           PMH: Ra, previously (incorreclty  Dx as Ankylosing Spondylitis)     Aggravating factors: movements involving overhead or forward reaching, vacuuming, washing dishes, driving, retraining students, carrying, lifting, school work (posture related)    Relieving factor: lidocaine patches, TENS, heat    Functional Deficits: house cleaning, typing/studying (postural)    Patient Goals  Improve tightness t/o R neck/UT region  Quality of life: good    Pain  At best pain rating: 3  At worst pain ratin  Location: R scapular region      Diagnostic Tests  MRI studies: abnormal  Treatments  Previous treatment: physical therapy and medication  Patient Goals  Patient goals for therapy: increased motion  Patient goal: Decrease tightness        Objective     Postural Observations  Seated posture: fair  Standing posture: fair  Correction of posture: has no consistent effect        Palpation     Additional Palpation Details  Increased tension t/o Cx paraspinals, Levator, UT on R  Hypomobile t/o Thoracic spine    Neurological Testing     Additional Neurological Details  Decreased LT in R midscapular region; remainder of NM exam (-)    Active Range of Motion   Cervical/Thoracic Spine   Cervical  Subcranial protraction: WFL   Flexion: WFL  Extension: Neck active extension: Min     Left lateral flexion: 25 degrees   Right lateral flexion: WFL  Left rotation: 52 degrees   Right rotation: [facility]    Thoracic   Flexion: WFL  Extension: Active thoracic extension: Mod    Left lateral flexion: Active left thoracic lateral flexion: Min  Right lateral flexion: Active right thoracic lateral flexion: Min  Left rotation: Active left thoracic rotation: Min  Right rotation: Active right thoracic rotation: Min       Additional Active Range of Motion Details  B Shoulder MMT 4/5 in OH planes    Tests     Additional Tests Details  (-) Shoulder Special testing for impingement  (-) Lag Signs  (-) Cx Compression  (-) Cx stability testing  B UE AROM WFL          Precautions: RA- No Traction, Neck Manipulation    Daily Treatment Diary     Manual  3/26            Prone PA mobs T3-T8             IASTM R Scap/Lev/UT                                                        Exercise Diary  3/26            HEP 10'            Mid Back Str             Levator Str             UT Str             Cx Retraction             R Mid Back Str             Cat/Cammel                                                                                                                                                                                          Modalities              MHP/TENS prn

## 2018-03-29 ENCOUNTER — OFFICE VISIT (OUTPATIENT)
Dept: NEUROLOGY | Facility: CLINIC | Age: 28
End: 2018-03-29
Payer: OTHER MISCELLANEOUS

## 2018-03-29 ENCOUNTER — OFFICE VISIT (OUTPATIENT)
Dept: BEHAVIORAL/MENTAL HEALTH CLINIC | Facility: CLINIC | Age: 28
End: 2018-03-29
Payer: COMMERCIAL

## 2018-03-29 VITALS
HEIGHT: 64 IN | DIASTOLIC BLOOD PRESSURE: 70 MMHG | BODY MASS INDEX: 30.05 KG/M2 | WEIGHT: 176 LBS | HEART RATE: 77 BPM | SYSTOLIC BLOOD PRESSURE: 104 MMHG

## 2018-03-29 DIAGNOSIS — F33.1 MODERATE EPISODE OF RECURRENT MAJOR DEPRESSIVE DISORDER (HCC): ICD-10-CM

## 2018-03-29 DIAGNOSIS — S09.90XD TRAUMATIC INJURY OF HEAD, SUBSEQUENT ENCOUNTER: ICD-10-CM

## 2018-03-29 DIAGNOSIS — F41.1 GAD (GENERALIZED ANXIETY DISORDER): ICD-10-CM

## 2018-03-29 DIAGNOSIS — Z72.89 SELF-INJURIOUS BEHAVIOR: ICD-10-CM

## 2018-03-29 DIAGNOSIS — F43.10 PTSD (POST-TRAUMATIC STRESS DISORDER): ICD-10-CM

## 2018-03-29 DIAGNOSIS — F50.9 EATING DISORDER: Primary | ICD-10-CM

## 2018-03-29 DIAGNOSIS — IMO0002 CHRONIC MIGRAINE: Primary | ICD-10-CM

## 2018-03-29 PROCEDURE — 90834 PSYTX W PT 45 MINUTES: CPT | Performed by: SOCIAL WORKER

## 2018-03-29 PROCEDURE — 99214 OFFICE O/P EST MOD 30 MIN: CPT | Performed by: PHYSICIAN ASSISTANT

## 2018-03-29 PROCEDURE — 96372 THER/PROPH/DIAG INJ SC/IM: CPT | Performed by: PHYSICIAN ASSISTANT

## 2018-03-29 RX ORDER — KETOROLAC TROMETHAMINE 30 MG/ML
60 INJECTION, SOLUTION INTRAMUSCULAR; INTRAVENOUS ONCE
Status: COMPLETED | OUTPATIENT
Start: 2018-03-29 | End: 2018-03-29

## 2018-03-29 RX ORDER — DIVALPROEX SODIUM 250 MG/1
TABLET, DELAYED RELEASE ORAL
Qty: 6 TABLET | Refills: 2 | Status: SHIPPED | OUTPATIENT
Start: 2018-03-29 | End: 2018-06-11

## 2018-03-29 RX ADMIN — KETOROLAC TROMETHAMINE 60 MG: 30 INJECTION, SOLUTION INTRAMUSCULAR; INTRAVENOUS at 12:52

## 2018-03-29 NOTE — LETTER
March 29, 2018     Patient: Jacinto Whyte   YOB: 1990   Date of Visit: 3/29/2018       To Whom it May Concern:    Jacinto Whyte is under my professional care  She was seen in my office on 3/29/2018  She may return to work on 4/9/18  She should take a week off considering her migraine headaches are worse after another head injury which occurred at work  She will continue to work with this office for treatment and management of headaches  If you have any questions or concerns, please don't hesitate to call           Sincerely,          Kary Cheatham PA-C        CC: Keith Prince, DO
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
## 2018-03-29 NOTE — PSYCH
Psychotherapy Provided: Individual Psychotherapy 50 minutes     Length of time in session: 50 minutes, follow up in 2 week    Goals addressed in session: Goal 1, Goal 2 and Goal 3      Pain:      none    0    Current suicide risk : Low     D: Met with Catalina brizuela  ROS; 'things are not good  '  Experiencing fleeting SI  5/7 a week  Acknowledged anxiety attacks 2/7 a week  Biting x3, purging x1, pulling out hair x1  Denied nightmares since last session  Dion Dumont focused upon Bri Pham remaining back home  Talking with Leilani Pittswater yet they had a disagreement where behaviors above were exhibited  Discussed taking time off from work until next school year; pros and cons discussed including [employer]  Discussed self care, relaxation skills and grounding techniques when dissociation occurs  A: Bri Pham presented with flatten affect, depressed and anxious mood  Bri Pham is beginning to see she is experiencing decreased functioning, hypervigilance and frequent mood fluctuations  Fleeting SI continues       P: Continue individual therapy  Ongoing discussion regarding symptoms, stressors, relationship, medical concerns    [address]: Diagnosis and Treatment Plan explained to Piyush Méndez relates understanding diagnosis and is agreeable to Treatment Plan   Yes

## 2018-03-30 ENCOUNTER — TELEPHONE (OUTPATIENT)
Dept: BEHAVIORAL HEALTH UNIT | Facility: HOSPITAL | Age: 28
End: 2018-03-30

## 2018-03-30 ENCOUNTER — TELEPHONE (OUTPATIENT)
Dept: PSYCHIATRY | Facility: CLINIC | Age: 28
End: 2018-03-30

## 2018-03-30 NOTE — TELEPHONE ENCOUNTER
Pt called stating that you called her stating that you have a opening on April 3 @ 3pm  I dont see anything is open would you like for me to open that time? ?

## 2018-04-02 ENCOUNTER — OFFICE VISIT (OUTPATIENT)
Dept: BEHAVIORAL/MENTAL HEALTH CLINIC | Facility: CLINIC | Age: 28
End: 2018-04-02
Payer: COMMERCIAL

## 2018-04-02 DIAGNOSIS — F41.1 GAD (GENERALIZED ANXIETY DISORDER): ICD-10-CM

## 2018-04-02 DIAGNOSIS — F33.1 MODERATE EPISODE OF RECURRENT MAJOR DEPRESSIVE DISORDER (HCC): ICD-10-CM

## 2018-04-02 DIAGNOSIS — F50.9 EATING DISORDER: Primary | ICD-10-CM

## 2018-04-02 DIAGNOSIS — F43.10 PTSD (POST-TRAUMATIC STRESS DISORDER): ICD-10-CM

## 2018-04-02 DIAGNOSIS — Z72.89 SELF-INJURIOUS BEHAVIOR: ICD-10-CM

## 2018-04-02 PROCEDURE — 90834 PSYTX W PT 45 MINUTES: CPT | Performed by: SOCIAL WORKER
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
## 2018-04-03 ENCOUNTER — OFFICE VISIT (OUTPATIENT)
Dept: PSYCHIATRY | Facility: CLINIC | Age: 28
End: 2018-04-03
Payer: COMMERCIAL

## 2018-04-03 VITALS
HEIGHT: 64 IN | SYSTOLIC BLOOD PRESSURE: 108 MMHG | DIASTOLIC BLOOD PRESSURE: 74 MMHG | WEIGHT: 175 LBS | BODY MASS INDEX: 29.88 KG/M2 | HEART RATE: 71 BPM | RESPIRATION RATE: 14 BRPM

## 2018-04-03 DIAGNOSIS — F43.10 PTSD (POST-TRAUMATIC STRESS DISORDER): Primary | ICD-10-CM

## 2018-04-03 DIAGNOSIS — F50.9 EATING DISORDER: ICD-10-CM

## 2018-04-03 DIAGNOSIS — F33.1 MODERATE EPISODE OF RECURRENT MAJOR DEPRESSIVE DISORDER (HCC): ICD-10-CM

## 2018-04-03 DIAGNOSIS — F41.1 GAD (GENERALIZED ANXIETY DISORDER): ICD-10-CM

## 2018-04-03 PROCEDURE — 99214 OFFICE O/P EST MOD 30 MIN: CPT | Performed by: PSYCHIATRY & NEUROLOGY

## 2018-04-03 RX ORDER — METHOCARBAMOL 750 MG/1
750 TABLET, FILM COATED ORAL
COMMUNITY
End: 2018-06-11

## 2018-04-03 RX ORDER — PRAZOSIN HYDROCHLORIDE 2 MG/1
CAPSULE ORAL
Qty: 90 CAPSULE | Refills: 1 | Status: SHIPPED | OUTPATIENT
Start: 2018-04-03 | End: 2018-05-21

## 2018-04-03 RX ORDER — FLUOXETINE HYDROCHLORIDE 20 MG/1
CAPSULE ORAL
Qty: 60 CAPSULE | Refills: 1 | Status: SHIPPED | OUTPATIENT
Start: 2018-04-03 | End: 2018-04-03 | Stop reason: SDUPTHER

## 2018-04-03 RX ORDER — FLUOXETINE HYDROCHLORIDE 20 MG/1
CAPSULE ORAL
Qty: 60 CAPSULE | Refills: 0 | Status: SHIPPED | OUTPATIENT
Start: 2018-04-03 | End: 2018-05-22

## 2018-04-04 ENCOUNTER — TELEPHONE (OUTPATIENT)
Dept: NEUROLOGY | Facility: CLINIC | Age: 28
End: 2018-04-04

## 2018-04-04 ENCOUNTER — OFFICE VISIT (OUTPATIENT)
Dept: PHYSICAL THERAPY | Facility: CLINIC | Age: 28
End: 2018-04-04
Payer: OTHER MISCELLANEOUS

## 2018-04-04 DIAGNOSIS — M77.8 RIGHT SHOULDER TENDONITIS: Primary | ICD-10-CM

## 2018-04-04 PROCEDURE — 97110 THERAPEUTIC EXERCISES: CPT | Performed by: PHYSICAL THERAPIST

## 2018-04-04 PROCEDURE — 97112 NEUROMUSCULAR REEDUCATION: CPT | Performed by: PHYSICAL THERAPIST

## 2018-04-04 PROCEDURE — 97140 MANUAL THERAPY 1/> REGIONS: CPT | Performed by: PHYSICAL THERAPIST

## 2018-04-04 NOTE — LETTER
April 5, 2018     Patient: Kushal Pelletier   YOB: 1990   Date of Visit: 4/4/2018       To Whom It May Concern: It is my medical opinion that Kushal Pelletier should remain out of work until 4/16/18  If you have any questions or concerns, please don't hesitate to call           Sincerely,        Anneliese Obregon PA-C    CC: Kushal Pelletier

## 2018-04-04 NOTE — LETTER
April 9, 2018     Patient: Vishnu Dubose   YOB: 1990   Date of Visit: 4/4/2018       To Whom It May Concern: It is my medical opinion that Vishnu Dubose should be excused from work from Monday 4/9/18 through Friday, 4/20/18  If you have any questions or concerns, please don't hesitate to call           Sincerely,          Antoine Bhatt PA-C    CC: No Recipients

## 2018-04-04 NOTE — TELEPHONE ENCOUNTER
PREFER 1 week, but if she is still having headaches can give 2 weeks but no more than that  Prefer she get fmla    I forget what she said about that and if she has fmla or not
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
## 2018-04-04 NOTE — PROGRESS NOTES
Daily Note     Today's date: 2018  Patient name: Js Rust  : 1990  MRN: [id number]  Referring provider: Feliz Mazariegos MD  Dx:   Encounter Diagnosis     ICD-10-CM    1  Right shoulder tendonitis M75 81                   Subjective: She states she fell on Friday while slipping over a shoe and she has a fracture on her proximal radius  She will be seeing an MD and she would like to come here for OT  Objective: See treatment diary below      Assessment: Tolerated treatment well  Patient exhibited good technique with therapeutic exercises She had pain with chin tucks and held on WB exercises due to L arm  Plan: Continue per plan of care     Precautions: RA- No Traction, Neck Manipulation    Daily Treatment Diary     Manual  3/26 4/4           Prone PA mobs T3-T8  5 min           IASTM R Scap/Lev/UT  5 min                                                      Exercise Diary  3/26 4/4           HEP 10'            Mid Back Str (thoracic ext over chair)  10x :05           Levator Str  :20 x4           UT Str  :20 x4           Cx Retraction  20            R Mid Back Str             Cat/Camel             Supine chin tucks  20x                                                                                                                                                                           Modalities              MHP/TENS prn  10 MHP

## 2018-04-05 ENCOUNTER — TELEPHONE (OUTPATIENT)
Dept: NEUROLOGY | Facility: CLINIC | Age: 28
End: 2018-04-05

## 2018-04-05 NOTE — TELEPHONE ENCOUNTER
I told her not to work this week so she could get better  I don't understand why she would go back if she was having sxs  Thanks

## 2018-04-05 NOTE — TELEPHONE ENCOUNTER
Pt called back and states that she forgot to mention that she has been working this week and would like letter to state she is to remain out from work 4/9-4/20  Please advise

## 2018-04-05 NOTE — TELEPHONE ENCOUNTER
Patient states she's still having daily headaches and is requesting letter for 2 weeks  Unable to do FMLA as this is under workers comp      Please fax letter to [phone]

## 2018-04-09 ENCOUNTER — OFFICE VISIT (OUTPATIENT)
Dept: PHYSICAL THERAPY | Facility: CLINIC | Age: 28
End: 2018-04-09
Payer: OTHER MISCELLANEOUS

## 2018-04-09 DIAGNOSIS — M77.8 RIGHT SHOULDER TENDONITIS: Primary | ICD-10-CM

## 2018-04-09 PROCEDURE — 97110 THERAPEUTIC EXERCISES: CPT | Performed by: PHYSICAL THERAPIST

## 2018-04-09 PROCEDURE — 97112 NEUROMUSCULAR REEDUCATION: CPT | Performed by: PHYSICAL THERAPIST

## 2018-04-09 PROCEDURE — 97140 MANUAL THERAPY 1/> REGIONS: CPT | Performed by: PHYSICAL THERAPIST

## 2018-04-09 NOTE — TELEPHONE ENCOUNTER
Patient called in checking status of letter  Clarified with her why she went back to work, states she didn't have the letter  Questioning if she can have this letter updated with dates listed previously, April 9-20  States she needs this as soon as possible

## 2018-04-09 NOTE — PROGRESS NOTES
Daily Note     Today's date: 2018  Patient name: Merry Fernández  : 1990  MRN: [id number]  Referring provider: Jocelyne Huynh MD  Dx:   Encounter Diagnosis     ICD-10-CM    1  Right shoulder tendonitis M75 81                   Subjective: Pt wearing sling on L shoilder; sees Dr Candi Carmichael next week  Objective: See treatment diary below      Assessment: Limited ability to perform some TE and advance some of desired program due to L UE injury       Plan: Continue per plan of care     Precautions: RA- No Traction, Neck Manipulation    Daily Treatment Diary     Manual  3/26 4/4 4/9          Prone PA mobs T3-T8  5 min 5'          IASTM R Scap/Lev/UT  5 min 5'                                                     Exercise Diary  3/26 4/4 4/9          HEP 10'            Mid Back Str (thoracic ext over chair)  10x :05 10x :05          Levator Str  :20 x4 :20 6x          UT Str  :20 x4 :20 6x          Cx Retraction  20  20x          R Mid Back Str   Vs wall :10/10x          Cat/Camel   Mod :10/10x          Supine chin tucks  20x           Pball rollout Flex and L side   :10 10x                                                                                                                                                             Modalities             MHP/TENS prn  10 MHP 10' MHP                                    Direct Supervision 5-5:30

## 2018-04-10 ENCOUNTER — OFFICE VISIT (OUTPATIENT)
Dept: OBGYN CLINIC | Facility: CLINIC | Age: 28
End: 2018-04-10
Payer: COMMERCIAL

## 2018-04-10 ENCOUNTER — APPOINTMENT (OUTPATIENT)
Dept: RADIOLOGY | Facility: CLINIC | Age: 28
End: 2018-04-10
Payer: COMMERCIAL

## 2018-04-10 VITALS
DIASTOLIC BLOOD PRESSURE: 79 MMHG | SYSTOLIC BLOOD PRESSURE: 120 MMHG | BODY MASS INDEX: 30.05 KG/M2 | HEART RATE: 76 BPM | WEIGHT: 176 LBS | HEIGHT: 64 IN

## 2018-04-10 DIAGNOSIS — M25.532 PAIN IN LEFT WRIST: ICD-10-CM

## 2018-04-10 DIAGNOSIS — M25.522 PAIN IN LEFT ELBOW: ICD-10-CM

## 2018-04-10 DIAGNOSIS — S52.135A CLOSED NONDISPLACED FRACTURE OF NECK OF LEFT RADIUS, INITIAL ENCOUNTER: Primary | ICD-10-CM

## 2018-04-10 DIAGNOSIS — S63.502A SPRAIN OF LEFT WRIST, INITIAL ENCOUNTER: ICD-10-CM

## 2018-04-10 PROCEDURE — 73110 X-RAY EXAM OF WRIST: CPT

## 2018-04-10 PROCEDURE — 24650 CLTX RDL HEAD/NCK FX WO MNPJ: CPT | Performed by: ORTHOPAEDIC SURGERY

## 2018-04-10 PROCEDURE — 73080 X-RAY EXAM OF ELBOW: CPT

## 2018-04-10 PROCEDURE — 99214 OFFICE O/P EST MOD 30 MIN: CPT | Performed by: ORTHOPAEDIC SURGERY

## 2018-04-10 NOTE — TELEPHONE ENCOUNTER
Pt aware that letter is mailed home today; pt does have appt @[facility] today, would like to  a copy there   Letter may be found in EP[employer] under Letters tab, its the 1st letter listed from yesterday 4/9/18, has my name on it

## 2018-04-10 NOTE — LETTER
April 10, 2018     Patient: Vishnu Dubose   YOB: 1990   Date of Visit: 4/10/2018       To Whom it May Concern:    Vishnu Dubose is under my professional care  She was seen in my office on 4/10/2018  She is unable to work until the next evaluation in 4 weeks  If you have any questions or concerns, please don't hesitate to call           Sincerely,          Ritesh Manriquez MD        CC: No Recipients

## 2018-04-10 NOTE — PROGRESS NOTES
Patient Name:  Leonie Miller  MRN:  [id number]    Assessment & Plan    Left nondisplaced radial neck fracture and left wrist sprain 3/30/18  1  Sling as needed for comfort, wean as tolerated  2  Avoid repetitive use and lifting more than 1-2 lbs  3  OTC medication as needed  4  Follow-up in 4 weeks with x-rays left elbow  Chief Complaint    Left elbow and wrist pain      History of the Present Illness    64-year-old female presents with left elbow and wrist pain after a fall onto an outstretched left hand on 3/30/18  She reports sudden onset of severe pain worse with movement of her elbow or wrist   The pain localizes to the lateral elbow and radiates distally to the ulnar side of her wrist   No numbness or tingling  She was initially evaluated at an urgent care facility where x-rays of the left elbow, forearm, and wrist showed the nondisplaced radial neck fracture  The wrist x-rays were normal  She was placed in a sling and referred to orthopedic surgery for definitive treatment  Physical Exam    /79   Pulse 76   Ht 5' 4" (1 626 m)   Wt 79 8 kg (176 lb)   BMI 30 21 kg/m²     Left elbow/forearm/wrist:  Tenderness to palpation radial neck  Tenderness to palpation distal ulna  No soft tissue swelling or ecchymosis  Full range of motion of the wrist and elbow without gross instability  Constitutional:  Well-developed and well-nourished  Eyes:  Anicteric sclerae  Neck:  Supple  Lungs:  Unlabored breathing  Cardiovascular:  2+ radial pulse on the left  Skin:  Intact without erythema  Neurologic:  Sensation intact to light touch median ulnar and radial nerve distribution on the left  Psychiatric:  Mood and affect are appropriate  Data Review    I have personally reviewed pertinent films in PACS, and my interpretation follows  X-rays look left elbow/forearm/wrist 3/30/18 and 4/10/18:  Nondisplaced radial neck fracture        Past Medical History:   Diagnosis Date    [facility]ylosing spondylitis ([facility] [address] )     Depression     Fibromyalgia        Past Surgical History:   Procedure Laterality Date    MANDIBLE SURGERY      REDUCTION MAMMAPLASTY         No Known Allergies    Current Outpatient Prescriptions on File Prior to Visit   Medication Sig Dispense Refill    divalproex sodium (DEPAKOTE) 250 mg EC tablet 2 tabs qhs x 2 nights, then 1 tabs qhs x 2 nights, then stop  6 tablet 2    ergocalciferol (VITAMIN D2) 50,000 units Take 50,000 Units by mouth once a week      FLUoxetine (PROzac) 20 mg capsule Take 3 capsules daily  Week 2: reduce to 2 capsules daily  Week 3: 1 capsule daily  Week 4: Stop 60 capsule 0    levonorgestrel (MIRENA) 20 MCG/24HR IUD 1 each by Intrauterine route once      lidocaine (LIDODERM) 5 % Place 3 patches on the skin daily Remove & Discard patch within 12 hours or as directed by Md  Can use up to 3 patches daily 90 patch 5    methocarbamol (ROBAXIN) 750 mg tablet Take 750 mg by mouth daily at bedtime as needed for muscle spasms      prazosin (MINIPRESS) 2 mg capsule Take 1mg nightly  After 1 week may increase to 2mg nightly  90 capsule 1    pregabalin (LYRICA) 150 mg capsule Take 50 mg by mouth 2 (two) times a day      rizatriptan (MAXALT) 10 MG tablet Take 1 tablet by mouth      sertraline (ZOLOFT) 50 mg tablet Take 50mg daily  Week 2: 100mg daily  Week 4 and onward: 150mg daily 90 tablet 2    Tofacitinib Citrate (XELJANZ XR PO) Take by mouth      Topiramate ER (TROKENDI XR) 100 MG CP24 Take 150 mg by mouth      valACYclovir (VALTREX) 500 mg tablet Take 500 mg by mouth daily as needed       No current facility-administered medications on file prior to visit          Social History   Substance Use Topics    Smoking status: Never Smoker    Smokeless tobacco: Never Used    Alcohol use No      Comment: Alcohol use 1- drinks every week per [employer]       Family History   Problem Relation Age of Onset    Osteoporosis Mother     Fibromyalgia Mother     Rheum arthritis Mother     Ulcerative colitis Mother     Cancer Father      adenocarcinoma    Hypertension Family     Ulcers Family      peptic    Kidney disease Family          Review of Systems    General:  Negative for fever, lethargy/malaise, or night sweats  Eyes:  Negative for blurry vision or double vision  ENT:  Negative for hearing change, nasal discharge, or sore throat  Hematological:  Negative for bleeding problems or blood clots  Endocrine:  Negative for excessive thirst or temperature intolerance  Respiratory:  Negative for cough or wheezing  Cardiovascular:  Negative for chest pain, dyspnea on exertion, or palpitations  Gastrointestinal:  Negative for abdominal pain, diarrhea, or nausea/vomiting  Musculoskeletal:  As stated in the HPI and otherwise negative  Neurological:  Negative for confusion, headaches, or seizures  Psychological:  Negative for hallucinations or mood swings  Dermatological:  Negative for itching or rash        Fracture / Dislocation Treatment  Date/Time: 4/10/2018 3:04 PM  Performed by: Danielle Cartagena by: Melody Garcia   Injury location: elbow  Location details: left elbow  Injury type: fracture  Fracture type: radial neck

## 2018-04-10 NOTE — LETTER
April 10, 2018     Patient: Bel Camacho   YOB: 1990   Date of Visit: 4/10/2018       To Whom it May Concern:    Bel Camacho is under my professional care  She was seen in my office on 4/10/2018  She may return to work with limitations effective 4/10/18: Sling left arm as needed for comfort; minimal use of the left upper extremity; occasional simple grasping and fine manipulation with the left hand; maximum lifting 1 pound with the left upper extremity  If you have any questions or concerns, please don't hesitate to call           Sincerely,          Fabiola Johnson MD

## 2018-04-11 ENCOUNTER — OFFICE VISIT (OUTPATIENT)
Dept: PHYSICAL THERAPY | Facility: CLINIC | Age: 28
End: 2018-04-11
Payer: OTHER MISCELLANEOUS

## 2018-04-11 DIAGNOSIS — G43.709 CHRONIC MIGRAINE WITHOUT AURA WITHOUT STATUS MIGRAINOSUS, NOT INTRACTABLE: Primary | ICD-10-CM

## 2018-04-11 DIAGNOSIS — M77.8 RIGHT SHOULDER TENDONITIS: Primary | ICD-10-CM

## 2018-04-11 PROCEDURE — 97110 THERAPEUTIC EXERCISES: CPT | Performed by: PHYSICAL THERAPIST

## 2018-04-11 PROCEDURE — 97112 NEUROMUSCULAR REEDUCATION: CPT | Performed by: PHYSICAL THERAPIST

## 2018-04-11 PROCEDURE — 97010 HOT OR COLD PACKS THERAPY: CPT | Performed by: PHYSICAL THERAPIST

## 2018-04-11 PROCEDURE — 97140 MANUAL THERAPY 1/> REGIONS: CPT | Performed by: PHYSICAL THERAPIST

## 2018-04-11 RX ORDER — TOPIRAMATE 50 MG/1
CAPSULE, EXTENDED RELEASE ORAL
Qty: 30 CAPSULE | Refills: 2 | Status: SHIPPED | OUTPATIENT
Start: 2018-04-11 | End: 2018-04-24 | Stop reason: DRUGHIGH

## 2018-04-11 NOTE — PROGRESS NOTES
Daily Note     Today's date: 2018  Patient name: Nemesio Evans  : 1990  MRN: [id number]  Referring provider: Diann Leggett MD  Dx:   Encounter Diagnosis     ICD-10-CM    1  Right shoulder tendonitis M75 81                   Subjective: Pt wearing sling on L shoulder she states she had some soreness with WB exercises on RUE lv  Pt states she saw MD who suggested to use sling two more weeks and did not refer for Ot  Objective: See treatment diary below      Assessment: Tolerated TE well today  Plan: Continue per plan of care     Precautions: RA- No Traction, Neck Manipulation    Daily Treatment Diary     Manual  3/26 4/4 4/9 4/11         Prone PA mobs T3-T8  5 min 5' 5'         IASTM R Scap/Lev/UT  5 min 5' 5'                                                    Exercise Diary  3/26 4/4 4/9 4/11         HEP 10'            Mid Back Str (thoracic ext over chair)  10x :05 10x :05 10x :05         Levator Str  :20 x4 :20 6x :20 6x         UT Str  :20 x4 :20 6x :20 6x         Cx Retraction  20  20x 20x         R Mid Back Str   Vs wall :10/10x          Cat/Camel   Mod :10/10x Mod :10/10x         Supine chin tucks  20x           Pball rollout Flex and L side   :10 10x :10 10x                                                                                                                                                            Modalities            MHP/TENS prn  10 MHP 10' MHP 10 MHP                                   Direct Supervision 5-5:30

## 2018-04-16 ENCOUNTER — OFFICE VISIT (OUTPATIENT)
Dept: BEHAVIORAL/MENTAL HEALTH CLINIC | Facility: CLINIC | Age: 28
End: 2018-04-16
Payer: COMMERCIAL

## 2018-04-16 DIAGNOSIS — F50.9 EATING DISORDER: Primary | ICD-10-CM

## 2018-04-16 DIAGNOSIS — F41.1 GAD (GENERALIZED ANXIETY DISORDER): ICD-10-CM

## 2018-04-16 DIAGNOSIS — Z72.89 SELF-INJURIOUS BEHAVIOR: ICD-10-CM

## 2018-04-16 DIAGNOSIS — F33.1 MODERATE EPISODE OF RECURRENT MAJOR DEPRESSIVE DISORDER (HCC): ICD-10-CM

## 2018-04-16 DIAGNOSIS — F43.10 PTSD (POST-TRAUMATIC STRESS DISORDER): ICD-10-CM

## 2018-04-16 PROCEDURE — 90834 PSYTX W PT 45 MINUTES: CPT | Performed by: SOCIAL WORKER
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
## 2018-04-18 ENCOUNTER — OFFICE VISIT (OUTPATIENT)
Dept: PHYSICAL THERAPY | Facility: CLINIC | Age: 28
End: 2018-04-18
Payer: OTHER MISCELLANEOUS

## 2018-04-18 DIAGNOSIS — M77.8 RIGHT SHOULDER TENDONITIS: Primary | ICD-10-CM

## 2018-04-18 PROCEDURE — 97140 MANUAL THERAPY 1/> REGIONS: CPT | Performed by: PHYSICAL THERAPIST

## 2018-04-18 PROCEDURE — G8991 OTHER PT/OT GOAL STATUS: HCPCS | Performed by: PHYSICAL THERAPIST

## 2018-04-18 PROCEDURE — 97110 THERAPEUTIC EXERCISES: CPT | Performed by: PHYSICAL THERAPIST

## 2018-04-18 PROCEDURE — G8990 OTHER PT/OT CURRENT STATUS: HCPCS | Performed by: PHYSICAL THERAPIST

## 2018-04-18 NOTE — PROGRESS NOTES
Daily Note     Today's date: 2018  Patient name: Gloria Osuna  : 1990  MRN: [id number]  Referring provider: Tawnya Alexander MD  Dx:   Encounter Diagnosis     ICD-10-CM    1  Right shoulder tendonitis M75 81                   Subjective: Pt presents today stating that her R shoulder is feeling well, she saw a chiropractor today and he cracked her back and she states there is good relief with this  Objective: See treatment diary below      Assessment: Pt proceeded with outlined activity with good mentality, minor cues for time for stretch holds, not wearing sling during today's session  Plan: Continue per plan of care     Precautions: RA- No Traction, Neck Manipulation    Daily Treatment Diary     Manual  3/26 4/4 4/9 4/11 4/18        Prone PA mobs T3-T8  5 min 5' 5' 5 mins        IASTM R Scap/Lev/UT  5 min 5' 5' 5 mins                                                   Exercise Diary  3/26 4/4 4/9 4/11 4/18        HEP 10'            Mid Back Str (thoracic ext over chair)  10x :05 10x :05 10x :05 10x x 5"        Levator Str  :20 x4 :20 6x :20 6x 20" x 6        UT Str  :20 x4 :20 6x :20 6x 20" x 6        Cx Retraction  20  20x 20x 20x        R Mid Back Str   Vs wall :10/10x          Cat/Camel   Mod :10/10x Mod :10/10x Mod 10" x 10        Supine chin tucks  20x           Pball rollout Flex and L side   :10 10x :10 10x 10" x 10                                                                                                                                                           Modalities           MHP/TENS prn  10 MHP 10' MHP 10 MHP declined

## 2018-04-23 ENCOUNTER — EVALUATION (OUTPATIENT)
Dept: PHYSICAL THERAPY | Facility: CLINIC | Age: 28
End: 2018-04-23
Payer: OTHER MISCELLANEOUS

## 2018-04-23 DIAGNOSIS — M77.8 RIGHT SHOULDER TENDONITIS: Primary | ICD-10-CM

## 2018-04-23 DIAGNOSIS — M79.18 MYOFASCIAL PAIN: ICD-10-CM

## 2018-04-23 DIAGNOSIS — M54.12 CERVICAL RADICULOPATHY: ICD-10-CM

## 2018-04-23 PROCEDURE — G8990 OTHER PT/OT CURRENT STATUS: HCPCS | Performed by: PHYSICAL THERAPIST

## 2018-04-23 PROCEDURE — G8991 OTHER PT/OT GOAL STATUS: HCPCS | Performed by: PHYSICAL THERAPIST

## 2018-04-23 PROCEDURE — 97140 MANUAL THERAPY 1/> REGIONS: CPT | Performed by: PHYSICAL THERAPIST

## 2018-04-23 PROCEDURE — 97112 NEUROMUSCULAR REEDUCATION: CPT | Performed by: PHYSICAL THERAPIST

## 2018-04-23 NOTE — PROGRESS NOTES
PT Evaluation     Today's date: 2018  Patient name: Velma Pruitt  : 1990  MRN: [id number]  Referring provider: Denisa Whitaker MD  Dx:   Encounter Diagnosis     ICD-10-CM    1  Right shoulder tendonitis M75 81                   Assessment  Impairments: abnormal or restricted ROM, impaired physical strength and pain with function    Assessment details: Patient has been compliant with attending PT and home exercise program since initial eval   She  has made progress towards her goals and improvements in objective data since initial eval but is still limited compared to prior level of function  She continues with to have above listed impairments and would benefit from additional skilled PT to address these deficits to return to maximal level of function  Thank you for this pleasant referral       Understanding of Dx/Px/POC: good   Prognosis: good    Goals  ST-6 weeks  1  Patient to be independent with HEP - Met  2  Decrease pain at least 2 subjective levels  - Met    LT-12 weeks  1    Patient to voice comfort with self management of condition - Partially Met  2   75% or > decreased pain  - Partially Met  3   75% or > decreased functional deficits  - Partially met  4  Normalize AROM of all deficit planes  - Partially met  5  Normalize strength  -  Paritally met  7  Patient to voice understanding of activities/positions to avoid  -Met       Plan  Patient would benefit from: skilled PT  Referral necessary: No  Planned modality interventions: cryotherapy  Planned therapy interventions: IADL retraining, joint mobilization, manual therapy, motor coordination training, neuromuscular re-education, patient education, postural training, self care, strengthening, stretching, therapeutic activities, therapeutic exercise, home exercise program, flexibility, ADL training, balance and body mechanics training  Frequency: 2x week  Duration in weeks: 6  Treatment plan discussed with: patient        Subjective Evaluation    History of Present Illness  Date of onset: 2016  Mechanism of injury: Chief Complaint:  R scapular pain    Patient notes about 65% overall improvement since beginning therapy  She has been seeing a chiropractor 2x/week in addition to PT sessions  She notes activities involving neck rotation (driving)  and sidebending as well as prolonged computer work/studying   Continue to aggravate her  She notes burning pain remains but she is much less stiff and more tolerant to the pain      PMH: Ra, previously (incorreclty  Dx as Ankylosing Spondylitis)       Quality of life: good    Pain  At best pain rating: 3  At worst pain ratin  Location: R scapular region      Diagnostic Tests  MRI studies: abnormal  Treatments  Previous treatment: physical therapy and medication  Patient Goals  Patient goals for therapy: increased motion  Patient goal: Decrease tightness        Objective     Postural Observations  Seated posture: fair  Standing posture: fair  Correction of posture: has no consistent effect        Palpation     Additional Palpation Details  Increased tension t/o Cx paraspinals, Levator, UT on R  Hypomobile t/o Thoracic spine    Neurological Testing     Additional Neurological Details  Decreased LT in R midscapular region; remainder of NM exam (-)    Active Range of Motion   Cervical/Thoracic Spine   Cervical  Subcranial protraction: WFL   Flexion: WFL  Extension: Neck active extension: Min  Left lateral flexion: 25 degrees   Right lateral flexion: WFL  Left rotation: 52 degrees   Right rotation: [facility]    Thoracic   Flexion: WFL  Extension: Active thoracic extension: Mod    Left lateral flexion: Active left thoracic lateral flexion: Min  Right lateral flexion: Active right thoracic lateral flexion: Min  Left rotation: Active left thoracic rotation: Min  Right rotation: Active right thoracic rotation: Min       Additional Active Range of Motion Details  B Shoulder MMT 4/5 in [facility] planes    Tests     Additional Tests Details  (-) Shoulder Special testing for impingement  (-) Lag Signs  (-) Cx Compression  (-) Cx stability testing  B UE AROM WFL          Precautions: RA- No Traction, Neck Manipulation    Daily Treatment Diary   Manual  3/26 4/4 4/9 4/11 4/18 4/23       Prone PA mobs T3-T8  5 min 5' 5' 5 mins 5'       IASTM R Scap/Lev/UT  5 min 5' 5' 5 mins 5'                                                  Exercise Diary  3/26 4/4 4/9 4/11 4/18 4/23       HEP 10'            Mid Back Str (thoracic ext over chair)  10x :05 10x :05 10x :05 10x x 5" :10/10x       Levator Str  :20 x4 :20 6x :20 6x 20" x 6 :20/6x       UT Str  :20 x4 :20 6x :20 6x 20" x 6 :20/6x       Cx Retraction  20  20x 20x 20x 20x       R Mid Back Str   Vs wall :10/10x   :10/10x       Cat/Camel   Mod :10/10x Mod :10/10x Mod 10" x 10 :10/10x       Supine chin tucks  20x           Pball rollout Flex and L side   :10 10x :10 10x 10" x 10 :10/10x                                                                                                                                                          Modalities   4/4 4/9 4/11 4/18 4/23       MHP/TENS prn  10 MHP 10' MHP 10 MHP declined

## 2018-04-24 ENCOUNTER — OFFICE VISIT (OUTPATIENT)
Dept: NEUROLOGY | Facility: CLINIC | Age: 28
End: 2018-04-24
Payer: COMMERCIAL

## 2018-04-24 VITALS
HEIGHT: 64 IN | SYSTOLIC BLOOD PRESSURE: 120 MMHG | DIASTOLIC BLOOD PRESSURE: 58 MMHG | HEART RATE: 62 BPM | WEIGHT: 176 LBS | BODY MASS INDEX: 30.05 KG/M2

## 2018-04-24 DIAGNOSIS — S09.90XS TRAUMATIC INJURY OF HEAD, SEQUELA: ICD-10-CM

## 2018-04-24 DIAGNOSIS — IMO0002 CHRONIC MIGRAINE: Primary | ICD-10-CM

## 2018-04-24 PROCEDURE — 99215 OFFICE O/P EST HI 40 MIN: CPT | Performed by: PHYSICIAN ASSISTANT

## 2018-04-24 NOTE — LETTER
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

## 2018-04-24 NOTE — LETTER
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
## 2018-04-25 ENCOUNTER — OFFICE VISIT (OUTPATIENT)
Dept: BEHAVIORAL/MENTAL HEALTH CLINIC | Facility: CLINIC | Age: 28
End: 2018-04-25
Payer: COMMERCIAL

## 2018-04-25 ENCOUNTER — OFFICE VISIT (OUTPATIENT)
Dept: PHYSICAL THERAPY | Facility: CLINIC | Age: 28
End: 2018-04-25
Payer: OTHER MISCELLANEOUS

## 2018-04-25 DIAGNOSIS — F43.10 PTSD (POST-TRAUMATIC STRESS DISORDER): ICD-10-CM

## 2018-04-25 DIAGNOSIS — M77.8 RIGHT SHOULDER TENDONITIS: Primary | ICD-10-CM

## 2018-04-25 DIAGNOSIS — F33.1 MODERATE EPISODE OF RECURRENT MAJOR DEPRESSIVE DISORDER (HCC): ICD-10-CM

## 2018-04-25 DIAGNOSIS — F50.9 EATING DISORDER: Primary | ICD-10-CM

## 2018-04-25 DIAGNOSIS — F41.1 GAD (GENERALIZED ANXIETY DISORDER): ICD-10-CM

## 2018-04-25 DIAGNOSIS — M79.18 MYOFASCIAL PAIN: ICD-10-CM

## 2018-04-25 DIAGNOSIS — Z72.89 SELF-INJURIOUS BEHAVIOR: ICD-10-CM

## 2018-04-25 DIAGNOSIS — M54.12 CERVICAL RADICULOPATHY: ICD-10-CM

## 2018-04-25 PROCEDURE — 97112 NEUROMUSCULAR REEDUCATION: CPT

## 2018-04-25 PROCEDURE — 97010 HOT OR COLD PACKS THERAPY: CPT

## 2018-04-25 PROCEDURE — 97140 MANUAL THERAPY 1/> REGIONS: CPT

## 2018-04-25 PROCEDURE — 90834 PSYTX W PT 45 MINUTES: CPT | Performed by: SOCIAL WORKER

## 2018-04-25 PROCEDURE — 97110 THERAPEUTIC EXERCISES: CPT
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
## 2018-04-25 NOTE — PROGRESS NOTES
Daily Note     Today's date: 2018  Patient name: Tan Gates  : 1990  MRN: [id number]  Referring provider: Angela Cabrera MD  Dx:   Encounter Diagnosis     ICD-10-CM    1  Right shoulder tendonitis M75 81    2  Cervical radiculopathy M54 12    3  Myofascial pain M79 1                   Subjective: Patient reports no significant changes since LV  Objective: See treatment diary below      Assessment: Tolerated treatment well  Patient exhibited good technique with therapeutic exercises and would benefit from continued PT  Symptoms remain unchanged with any activity  Plan: Continue per plan of care       Precautions: RA- No Traction, Neck Manipulation     Daily Treatment Diary   Manual  3/26 4/4 4/9 4/11 4/18 4/23  4/25         Prone PA mobs T3-T8   5 min 5' 5' 5 mins 5'  TS,PT         IASTM R Scap/Lev/UT   5 min 5' 5' 5 mins 5'  RM                                                                                       Exercise Diary  3/26 4/4 4/9 4/11 4/18 4/23  4/25         HEP 10'                     Mid Back Str (thoracic ext over chair)   10x :05 10x :05 10x :05 10x x 5" :10/10x  :10/10x         Levator Str   :20 x4 :20 6x :20 6x 20" x 6 :20/6x  :20/6x         UT Str   :20 x4 :20 6x :20 6x 20" x 6 :20/6x  :20/6x         Cx Retraction   20  20x 20x 20x 20x  20x         R Mid Back Str     Vs wall :10/10x     :10/10x  :10x/10x         Cat/Camel     Mod :10/10x Mod :10/10x Mod 10" x 10 :10/10x  :10x10         Supine chin tucks   20x                   Pball rollout Flex and L side     :10 10x :10 10x 10" x 10 :10/10x  :10x10          Open book             15x ea                                                                                                                                                                                                                                                               Modalities               MHP/TENS prn   10 MHP 10' MHP 10 MHP declined

## 2018-04-27 ENCOUNTER — TELEPHONE (OUTPATIENT)
Dept: OBGYN CLINIC | Facility: HOSPITAL | Age: 28
End: 2018-04-27

## 2018-04-27 NOTE — TELEPHONE ENCOUNTER
Esthela Awad # [phone]  Female, 33 y o , 1990    Patient is requesting new work note with end date for work restrictions  Please return her call when ready, she will  in office  Thank You

## 2018-04-27 NOTE — TELEPHONE ENCOUNTER
No addended to include restrictions until her next evaluation on 5/2/18    At that time we will extend the work note as needed

## 2018-04-30 ENCOUNTER — OFFICE VISIT (OUTPATIENT)
Dept: PHYSICAL THERAPY | Facility: CLINIC | Age: 28
End: 2018-04-30
Payer: OTHER MISCELLANEOUS

## 2018-04-30 ENCOUNTER — OFFICE VISIT (OUTPATIENT)
Dept: BEHAVIORAL/MENTAL HEALTH CLINIC | Facility: CLINIC | Age: 28
End: 2018-04-30
Payer: COMMERCIAL

## 2018-04-30 DIAGNOSIS — F33.1 MODERATE EPISODE OF RECURRENT MAJOR DEPRESSIVE DISORDER (HCC): Primary | ICD-10-CM

## 2018-04-30 DIAGNOSIS — F50.9 EATING DISORDER: ICD-10-CM

## 2018-04-30 DIAGNOSIS — M79.18 MYOFASCIAL PAIN: ICD-10-CM

## 2018-04-30 DIAGNOSIS — F41.1 GAD (GENERALIZED ANXIETY DISORDER): ICD-10-CM

## 2018-04-30 DIAGNOSIS — M54.12 CERVICAL RADICULOPATHY: ICD-10-CM

## 2018-04-30 DIAGNOSIS — F43.10 PTSD (POST-TRAUMATIC STRESS DISORDER): ICD-10-CM

## 2018-04-30 DIAGNOSIS — M77.8 RIGHT SHOULDER TENDONITIS: Primary | ICD-10-CM

## 2018-04-30 DIAGNOSIS — Z72.89 SELF-INJURIOUS BEHAVIOR: ICD-10-CM

## 2018-04-30 PROCEDURE — 97110 THERAPEUTIC EXERCISES: CPT | Performed by: PHYSICAL THERAPIST

## 2018-04-30 PROCEDURE — 97112 NEUROMUSCULAR REEDUCATION: CPT | Performed by: PHYSICAL THERAPIST

## 2018-04-30 PROCEDURE — 97140 MANUAL THERAPY 1/> REGIONS: CPT | Performed by: PHYSICAL THERAPIST

## 2018-04-30 PROCEDURE — 90834 PSYTX W PT 45 MINUTES: CPT | Performed by: SOCIAL WORKER

## 2018-04-30 NOTE — PROGRESS NOTES
Daily Note     Today's date: 2018  Patient name: Kamilah Helms  : 1990  MRN: [id number]  Referring provider: Inge Castleman, MD  Dx:   Encounter Diagnosis     ICD-10-CM    1  Right shoulder tendonitis M75 81    2  Cervical radiculopathy M54 12    3  Myofascial pain M79 1                   Subjective: Neck feeling good today      Objective: See treatment diary below  Reviewed dangers of upper cx manips with her PMH of RA given that she sees chiropractor who manipulates her neck      Assessment: Tolerated treatment well  Patient would benefit from continued PT      Plan: Continue per plan of care       Precautions: RA- No Traction, Neck Manipulation     Daily Treatment Diary   Manual  3/26 4/4 4/9 4/11 4/18 4/23  4/25  4/30       Prone PA mobs T3-T8   5 min 5' 5' 5 mins 5'  TS,PT  5'       IASTM R Scap/Lev/UT   5 min 5' 5' 5 mins 5'  RM  10'                                                                                     Exercise Diary  3/26 4/4 4/9 4/11 4/18 4/23  4/25  4/30       HEP 10'                     Mid Back Str (thoracic ext over chair)   10x :05 10x :05 10x :05 10x x 5" :10/10x  :10/10x  :10/10x       Levator Str   :20 x4 :20 6x :20 6x 20" x 6 :20/6x  :20/6x  :20/6x       UT Str   :20 x4 :20 6x :20 6x 20" x 6 :20/6x  :20/6x  :20/6x       Cx Retraction   20  20x 20x 20x 20x  20x  20x       R Mid Back Str     Vs wall :10/10x     :10/10x  :10x/10x  :10/10x       Cat/Camel     Mod :10/10x Mod :10/10x Mod 10" x 10 :10/10x  :10x10  :10/10x       Supine chin tucks   20x                   Pball rollout Flex and L side     :10 10x :10 10x 10" x 10 :10/10x  :10x10 :10/10x        Open book             15x ea  :05/10x        Corner Stretch               :20/6x                                                                                                                                                                                                                                     Modalities     4/9 4/11 4/18 4/23           MHP/TENS prn   10 MHP 10' MHP 10 MHP declined
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
## 2018-05-01 ENCOUNTER — TELEPHONE (OUTPATIENT)
Dept: OBGYN CLINIC | Facility: CLINIC | Age: 28
End: 2018-05-01

## 2018-05-01 ENCOUNTER — APPOINTMENT (OUTPATIENT)
Dept: RADIOLOGY | Facility: CLINIC | Age: 28
End: 2018-05-01
Payer: COMMERCIAL

## 2018-05-01 ENCOUNTER — OFFICE VISIT (OUTPATIENT)
Dept: OBGYN CLINIC | Facility: CLINIC | Age: 28
End: 2018-05-01

## 2018-05-01 VITALS
HEIGHT: 64 IN | DIASTOLIC BLOOD PRESSURE: 77 MMHG | WEIGHT: 176 LBS | SYSTOLIC BLOOD PRESSURE: 112 MMHG | BODY MASS INDEX: 30.05 KG/M2 | HEART RATE: 75 BPM

## 2018-05-01 DIAGNOSIS — M25.522 PAIN IN LEFT ELBOW: Primary | ICD-10-CM

## 2018-05-01 DIAGNOSIS — Z20.828 EXPOSURE TO HERPES SIMPLEX VIRUS (HSV): Primary | ICD-10-CM

## 2018-05-01 DIAGNOSIS — S52.135D CLOSED NONDISPLACED FRACTURE OF NECK OF LEFT RADIUS WITH ROUTINE HEALING, SUBSEQUENT ENCOUNTER: ICD-10-CM

## 2018-05-01 DIAGNOSIS — M25.522 PAIN IN LEFT ELBOW: ICD-10-CM

## 2018-05-01 PROCEDURE — 99024 POSTOP FOLLOW-UP VISIT: CPT | Performed by: ORTHOPAEDIC SURGERY

## 2018-05-01 PROCEDURE — 73080 X-RAY EXAM OF ELBOW: CPT

## 2018-05-01 RX ORDER — PRAZOSIN HYDROCHLORIDE 1 MG/1
CAPSULE ORAL
COMMUNITY
Start: 2018-04-18 | End: 2018-05-21

## 2018-05-01 NOTE — LETTER
May 1, 2018     Patient: Nam Luo   YOB: 1990   Date of Visit: 5/1/2018       To Whom it May Concern:    Nam Luo is under my professional care  She was seen in my office on 5/1/2018  She is cleared to return to work full duty without restrictions effective 5/1/2018  If you have any questions or concerns, please don't hesitate to call           Sincerely,          Dex Parnell MD        CC: No Recipients

## 2018-05-01 NOTE — PROGRESS NOTES
Patient Name:  Velma Pruitt  MRN:  [id number]    Assessment & Plan    Left radial neck fracture and left wrist sprain 3/30/18  1  Resume normal activity as tolerated  2  Follow-up as needed  Subjective    Patient returns today reporting significant improvement in her left elbow  She has no wrist pain  She reports some fatigue in her elbow after returning to work today  She remains on restrictions at work for unrelated injury  General ROS:  Negative for fever, lethargy/malaise, or night sweats  Objective    /77   Pulse 75   Ht 5' 4" (1 626 m)   Wt 79 8 kg (176 lb)   BMI 30 21 kg/m²     Left elbow:  Skin is intact without erythema  No soft tissue swelling  Nontender to palpation over the radial neck  Full range of motion of the elbow  Stable with varus and valgus stress  2+ radial pulse  Mood and affect are appropriate  Data Review    I have personally reviewed pertinent films in PACS, and my interpretation follows  X-rays left elbow 5/1/18:  Healing nondisplaced radial neck fracture

## 2018-05-01 NOTE — TELEPHONE ENCOUNTER
Can you please put an order in [facility] for a 4 month f/u herpes test

## 2018-05-02 ENCOUNTER — TELEPHONE (OUTPATIENT)
Dept: NEUROLOGY | Facility: CLINIC | Age: 28
End: 2018-05-02

## 2018-05-02 ENCOUNTER — LAB (OUTPATIENT)
Dept: LAB | Facility: CLINIC | Age: 28
End: 2018-05-02
Payer: COMMERCIAL

## 2018-05-02 ENCOUNTER — OFFICE VISIT (OUTPATIENT)
Dept: PHYSICAL THERAPY | Facility: CLINIC | Age: 28
End: 2018-05-02
Payer: OTHER MISCELLANEOUS

## 2018-05-02 ENCOUNTER — TRANSCRIBE ORDERS (OUTPATIENT)
Dept: LAB | Facility: CLINIC | Age: 28
End: 2018-05-02

## 2018-05-02 DIAGNOSIS — M77.8 RIGHT SHOULDER TENDONITIS: Primary | ICD-10-CM

## 2018-05-02 DIAGNOSIS — Z20.828 EXPOSURE TO HERPES SIMPLEX VIRUS (HSV): ICD-10-CM

## 2018-05-02 DIAGNOSIS — M54.12 CERVICAL RADICULOPATHY: ICD-10-CM

## 2018-05-02 DIAGNOSIS — M79.18 MYOFASCIAL PAIN: ICD-10-CM

## 2018-05-02 PROCEDURE — 86695 HERPES SIMPLEX TYPE 1 TEST: CPT

## 2018-05-02 PROCEDURE — 97112 NEUROMUSCULAR REEDUCATION: CPT | Performed by: PHYSICAL THERAPIST

## 2018-05-02 PROCEDURE — 97140 MANUAL THERAPY 1/> REGIONS: CPT | Performed by: PHYSICAL THERAPIST

## 2018-05-02 PROCEDURE — 86696 HERPES SIMPLEX TYPE 2 TEST: CPT

## 2018-05-02 PROCEDURE — 97110 THERAPEUTIC EXERCISES: CPT | Performed by: PHYSICAL THERAPIST

## 2018-05-02 NOTE — PROGRESS NOTES
Daily Note     Today's date: 2018  Patient name: Claus Nayak  : 1990  MRN: [id number]  Referring provider: Natasha Crouch MD  Dx:   Encounter Diagnosis     ICD-10-CM    1  Right shoulder tendonitis M75 81    2  Cervical radiculopathy M54 12    3  Myofascial pain M79 1                   Subjective: She states she has been pretty exhausted at work lately with her kids  She does have some soreness today  Objective: See treatment diary below  Assessment: Tolerated treatment well  Patient would benefit from continued PT  Held on Book stretch due to patient request for soreness  Plan: Continue per plan of care       Precautions: RA- No Traction, Neck Manipulation     Daily Treatment Diary   Manual  3/26 4/4 4/9 4/11 4/18 4/23  4/25  4/30  5/2     Prone PA mobs T3-T8   5 min 5' 5' 5 mins 5'  TS,PT  5'  5'     IASTM R Scap/Lev/UT   5 min 5' 5' 5 mins 5'  RM  10'  10'                                                                                   Exercise Diary  3/26 4/4 4/9 4/11 4/18 4/23  4/25  4/30       HEP 10'                     Mid Back Str (thoracic ext over chair)   10x :05 10x :05 10x :05 10x x 5" :10/10x  :10/10x  :10/10x  :10/10x     Levator Str   :20 x4 :20 6x :20 6x 20" x 6 :20/6x  :20/6x  :20/6x  :20/6x     UT Str   :20 x4 :20 6x :20 6x 20" x 6 :20/6x  :20/6x  :20/6x  :20/6x     Cx Retraction   20  20x 20x 20x 20x  20x  20x  20x     R Mid Back Str     Vs wall :10/10x     :10/10x  :10x/10x  :10/10x  :10/10x     Cat/Camel     Mod :10/10x Mod :10/10x Mod 10" x 10 :10/10x  :10x10  :10/10x  :10/10x     Supine chin tucks   20x                   Pball rollout Flex and L side     :10 10x :10 10x 10" x 10 :10/10x  :10x10 :10/10x  10x :10      Open book             15x ea  :05/10x        Corner Stretch               :20/6x  :20/6x                                                                                                                                                                                                                                   Modalities    4/4 4/9 4/11 4/18 4/23           MHP/TENS prn   10 MHP 10' MHP 10 MHP declined

## 2018-05-02 NOTE — TELEPHONE ENCOUNTER
Yes! I will complete it  Thanks  Elin Mcmanus- I forgot this note at work  I will contact you later for assistance if possible  Thanks

## 2018-05-02 NOTE — TELEPHONE ENCOUNTER
I have to do it tomorrow when I have the paperwork in front of me at [facility]  Thanks

## 2018-05-02 NOTE — TELEPHONE ENCOUNTER
Pt lmom following up on her letter for work  She states that HR wanted more specific things written in note  She states that she slipped a note under our door with specifics that needed to be added or changed  Did you receive this note?   [phone]

## 2018-05-03 LAB
HSV1 IGG SER IA-ACNC: <0.91 INDEX (ref 0–0.9)
HSV2 IGG SER IA-ACNC: <0.91 INDEX (ref 0–0.9)

## 2018-05-04 LAB
HSV1 IGM TITR SER IF: NORMAL TITER
HSV2 IGM TITR SER IF: NORMAL TITER

## 2018-05-08 ENCOUNTER — OFFICE VISIT (OUTPATIENT)
Dept: PHYSICAL THERAPY | Facility: CLINIC | Age: 28
End: 2018-05-08
Payer: OTHER MISCELLANEOUS

## 2018-05-08 DIAGNOSIS — M79.18 MYOFASCIAL PAIN: ICD-10-CM

## 2018-05-08 DIAGNOSIS — M54.12 CERVICAL RADICULOPATHY: ICD-10-CM

## 2018-05-08 DIAGNOSIS — M77.8 RIGHT SHOULDER TENDONITIS: Primary | ICD-10-CM

## 2018-05-08 PROCEDURE — 97112 NEUROMUSCULAR REEDUCATION: CPT

## 2018-05-08 PROCEDURE — 97140 MANUAL THERAPY 1/> REGIONS: CPT

## 2018-05-08 PROCEDURE — 97110 THERAPEUTIC EXERCISES: CPT

## 2018-05-08 NOTE — PROGRESS NOTES
Daily Note     Today's date: 2018  Patient name: Ofelia Asif  : 1990  MRN: [id number]  Referring provider: Luana Philip MD  Dx:   Encounter Diagnosis     ICD-10-CM    1  Right shoulder tendonitis M75 81    2  Cervical radiculopathy M54 12    3  Myofascial pain M79 1                   Subjective: Patient states that she has noticed an improvement with her cervical pain   Continues to experiencing "burning" in L shoulder since RTW      Objective: See treatment diary below  Precautions: RA- No Traction, Neck Manipulation     Daily Treatment Diary   Manual  3/26 4/4 4/9 4/11 4/18 4/23  4/25  4/30  5/2  5   Prone PA mobs T3-T8   5 min 5' 5' 5 mins 5'  TS,PT  5'  5'  5 - RH,DPT   IASTM R Scap/Lev/UT   5 min 5' 5' 5 mins 5'  RM  10'  10'  10'                                                                                 Exercise Diary  3/26 4/4 4/9 4/11 4/18 4/23  4/25  4/30    5/8   HEP 10'                     Mid Back Str (thoracic ext over chair)   10x :05 10x :05 10x :05 10x x 5" :10/10x  :10/10x  :10/10x  :10/10x  :10x10    Levator Str   :20 x4 :20 6x :20 6x 20" x 6 :20/6x  :20/6x  :20/6x  :20/6x  :20/6    UT Str   :20 x4 :20 6x :20 6x 20" x 6 :20/6x  :20/6x  :20/6x  :20/6x  :20 x6    Cx Retraction   20  20x 20x 20x 20x  20x  20x  20x  20x   R Mid Back Str     Vs wall :10/10x     :10/10x  :10x/10x  :10/10x  :10/10x     Cat/Camel     Mod :10/10x Mod :10/10x Mod 10" x 10 :10/10x  :10x10  :10/10x  :10/10x     Supine chin tucks   20x                   Pball rollout Flex and L side     :10 10x :10 10x 10" x 10 :10/10x  :10x10 :10/10x  10x :10  :10x10    Open book             15x ea  :05/10x        Corner Stretch               :20/6x  :20/6x  :20x6                                                                                                                                                                                                                                 Modalities     5/8         MHP/TENS prn   10 MHP 10' MHP 10 MHP declined    declined           Assessment: Tolerated treatment well  Patient exhibited good technique with therapeutic exercises      Plan: Continue per plan of care

## 2018-05-09 ENCOUNTER — TELEPHONE (OUTPATIENT)
Dept: OBGYN CLINIC | Facility: CLINIC | Age: 28
End: 2018-05-09

## 2018-05-09 RX ORDER — TOPIRAMATE 100 MG/1
CAPSULE, EXTENDED RELEASE ORAL
Qty: 30 CAPSULE | Refills: 3 | OUTPATIENT
Start: 2018-05-09

## 2018-05-09 NOTE — TELEPHONE ENCOUNTER
Letter written, signed & faxed to [phone] per the patient's request  I will call the patient and let her know the note was edited and faxed per her request

## 2018-05-09 NOTE — TELEPHONE ENCOUNTER
Patient left message on nurse line calling for test results  Returned call left message on voicemail to call the office

## 2018-05-09 NOTE — TELEPHONE ENCOUNTER
lmom to make patient aware the letter has been faxed, will mail a copy to the patient as well to keep for her files

## 2018-05-11 ENCOUNTER — HOSPITAL ENCOUNTER (OUTPATIENT)
Dept: MRI IMAGING | Facility: HOSPITAL | Age: 28
Discharge: HOME | End: 2018-05-11
Payer: OTHER MISCELLANEOUS

## 2018-05-11 DIAGNOSIS — IMO0002 CHRONIC MIGRAINE: ICD-10-CM

## 2018-05-11 DIAGNOSIS — S09.90XS TRAUMATIC INJURY OF HEAD, SEQUELA: ICD-10-CM

## 2018-05-11 PROCEDURE — 70551 MRI BRAIN STEM W/O DYE: CPT

## 2018-05-15 ENCOUNTER — APPOINTMENT (OUTPATIENT)
Dept: PHYSICAL THERAPY | Facility: CLINIC | Age: 28
End: 2018-05-15
Payer: OTHER MISCELLANEOUS

## 2018-05-15 ENCOUNTER — TELEPHONE (OUTPATIENT)
Dept: OBGYN CLINIC | Facility: CLINIC | Age: 28
End: 2018-05-15

## 2018-05-15 DIAGNOSIS — G43.109 MIGRAINE WITH AURA AND WITHOUT STATUS MIGRAINOSUS, NOT INTRACTABLE: Primary | ICD-10-CM

## 2018-05-15 NOTE — TELEPHONE ENCOUNTER
Patient c/o lingering yeast infection going on 4 weeks  C/O vaginal discharge started thick no thin yellow with a fishy odor and vaginal itching  She has been taking OTC AZO  Advised Monistat 7  If no improvement call the office for an appointment  Verbalized understanding  Routing to provider for further recommendations

## 2018-05-16 ENCOUNTER — TELEPHONE (OUTPATIENT)
Dept: NEUROLOGY | Facility: CLINIC | Age: 28
End: 2018-05-16

## 2018-05-16 RX ORDER — RIZATRIPTAN BENZOATE 10 MG/1
TABLET ORAL
Qty: 9 TABLET | Refills: 3 | Status: SHIPPED | OUTPATIENT
Start: 2018-05-16 | End: 2018-07-17 | Stop reason: SDUPTHER

## 2018-05-17 ENCOUNTER — EVALUATION (OUTPATIENT)
Dept: PHYSICAL THERAPY | Facility: CLINIC | Age: 28
End: 2018-05-17
Payer: OTHER MISCELLANEOUS

## 2018-05-17 DIAGNOSIS — M54.12 CERVICAL RADICULOPATHY: ICD-10-CM

## 2018-05-17 DIAGNOSIS — M79.18 MYOFASCIAL PAIN: ICD-10-CM

## 2018-05-17 DIAGNOSIS — M77.8 RIGHT SHOULDER TENDONITIS: Primary | ICD-10-CM

## 2018-05-17 PROCEDURE — 97110 THERAPEUTIC EXERCISES: CPT | Performed by: PHYSICAL THERAPIST

## 2018-05-17 PROCEDURE — G8990 OTHER PT/OT CURRENT STATUS: HCPCS | Performed by: PHYSICAL THERAPIST

## 2018-05-17 PROCEDURE — 97112 NEUROMUSCULAR REEDUCATION: CPT | Performed by: PHYSICAL THERAPIST

## 2018-05-17 PROCEDURE — 97140 MANUAL THERAPY 1/> REGIONS: CPT | Performed by: PHYSICAL THERAPIST

## 2018-05-17 PROCEDURE — G8991 OTHER PT/OT GOAL STATUS: HCPCS | Performed by: PHYSICAL THERAPIST

## 2018-05-17 NOTE — PROGRESS NOTES
PT Re-Evaluation    Today's date: 2018  Patient name: Nemesio Evans  : 1990  MRN: [id number]  Referring provider: Diann Leggett MD  Dx:   Encounter Diagnosis     ICD-10-CM    1  Right shoulder tendonitis M75 81    2  Cervical radiculopathy M54 12    3  Myofascial pain M79 1                   Assessment  Impairments: abnormal or restricted ROM, impaired physical strength and pain with function    Assessment details: Patient has been compliant with attending PT and home exercise program since initial eval   She  has made progress towards her goals and improvements in objective data since initial eval but is still limited compared to prior level of function  She continues with to have above listed impairments and would benefit from additional skilled PT to address these deficits to return to maximal level of function, however she is also independent with HEP and feels ready to attempt HEP  Therefore she will be transitioned to a HEP     Thank you for this pleasant referral       Understanding of Dx/Px/POC: good   Prognosis: good    Goals  ST-6 weeks  1  Patient to be independent with HEP - Met  2  Decrease pain at least 2 subjective levels  - Met    LT-12 weeks  1    Patient to voice comfort with self management of condition - Partially Met  2   75% or > decreased pain  - Partially Met  3   75% or > decreased functional deficits  - Partially met  4  Normalize AROM of all deficit planes  - Partially met  5  Normalize strength  -  Paritally met  7  Patient to voice understanding of activities/positions to avoid  -Met       Plan  Patient would benefit from: skilled PT  Referral necessary: No  Planned modality interventions: cryotherapy  Planned therapy interventions: IADL retraining, joint mobilization, manual therapy, motor coordination training, neuromuscular re-education, patient education, postural training, self care, strengthening, stretching, therapeutic activities, therapeutic exercise, home exercise program, flexibility, ADL training, balance and body mechanics training  Treatment plan discussed with: patient        Subjective Evaluation    History of Present Illness  Date of onset: 2016  Mechanism of injury: Chief Complaint:  R scapular pain    Patient notes less tightness but burning pain remains  She continues to have some limited overhead strength/function  She would like to transition to attempting HEP    PMH: Ra, previously (incorreclty  Dx as Ankylosing Spondylitis)       Quality of life: good    Pain  No pain reported  At best pain rating: 3  At worst pain ratin  Location: R scapular region      Diagnostic Tests  MRI studies: abnormal  Treatments  Previous treatment: physical therapy and medication  Patient Goals  Patient goals for therapy: increased motion  Patient goal: Decrease tightness        Objective     Postural Observations  Seated posture: fair  Standing posture: fair  Correction of posture: has no consistent effect        Palpation     Additional Palpation Details  Increased tension t/o Cx paraspinals, Levator, UT on R- Mod decreased  Hypomobile t/o Thoracic spine- Remains    Neurological Testing     Additional Neurological Details  Decreased LT in R midscapular region; remainder of NM exam (-)    Active Range of Motion   Cervical/Thoracic Spine   Cervical  Subcranial protraction: WFL   Flexion: WFL  Extension: Neck active extension: Min  Left lateral flexion: 25 degrees   Right lateral flexion: WFL  Left rotation: 80 degrees   Right rotation: [facility]    Thoracic   Flexion: WFL  Extension: Active thoracic extension: Mod    Left lateral flexion: Active left thoracic lateral flexion: Min  Right lateral flexion: Active right thoracic lateral flexion: Min  Left rotation: Active left thoracic rotation: Min  Right rotation: Active right thoracic rotation: Min       Additional Active Range of Motion Details  B Shoulder MMT 4+/5 in OH planes    Tests     Additional Tests Details  (-) Shoulder Special testing for impingement  (-) Lag Signs  (-) Cx Compression  (-) Cx stability testing  B UE AROM WFL          Precautions: RA- No Traction, Neck Manipulation    Daily Note     Today's date: 2018  Patient name: Tan Gates  : 1990  MRN: [id number]  Referring provider: Angela Cabrera MD  Dx:   Encounter Diagnosis     ICD-10-CM    1  Right shoulder tendonitis M75 81    2  Cervical radiculopathy M54 12    3  Myofascial pain M79 1                   Subjective: Patient states that she has noticed an improvement with her cervical pain   Continues to experiencing "burning" in L shoulder since RTW      Objective: See treatment diary below  Precautions: RA- No Traction, Neck Manipulation     Daily Treatment Diary   Manual    5   Prone PA mobs T3-T8  5' 5 min 5' 5' 5 mins 5'  TS,PT  5'  5'  5 - RH,DPT   IASTM R Scap/Lev/UT  10' 5 min 5' 5' 5 mins 5'  RM  10'  10'  10'                                                                                 Exercise Diary  8   HEP 10'                     Mid Back Str (thoracic ext over chair)  :10/10x 10x :05 10x :05 10x :05 10x x 5" :10/10x  :10/10x  :10/10x  :10/10x  :10x10    Levator Str :20/6x :20 x4 :20 6x :20 6x 20" x 6 :20/6x  :20/6x  :20/6x  :20/6x  :20/6    UT Str  :20/6x :20 x4 :20 6x :20 6x 20" x 6 :20/6x  :20/6x  :20/6x  :20/6x  :20 x6    Cx Retraction 20x 20  20x 20x 20x 20x  20x  20x  20x  20x   R Mid Back Str     Vs wall :10/10x     :10/10x  :10x/10x  :10/10x  :10/10x     Cat/Camel  :10/10x   Mod :10/10x Mod :10/10x Mod 10" x 10 :10/10x  :10x10  :10/10x  :10/10x     Supine chin tucks   20x                   Pball rollout Flex and L side  :10/10x   :10 10x :10 10x 10" x 10 :10/10x  :10x10 :10/10x  10x :10  :10x10    Open book  :10/10x           15x ea  :05/10x        Corner Stretch  :20/6x             :20/6x  :20/6x  :20x6                                                                                                                                                                                                                                 Modalities    4/4 4/9 4/11 4/18 4/23 5/8         MHP/TENS prn   10 MHP 10' MHP 10 MHP declined    declined           Assessment: Tolerated treatment well  Patient exhibited good technique with therapeutic exercises      Plan: Continue per plan of care

## 2018-05-21 ENCOUNTER — OFFICE VISIT (OUTPATIENT)
Dept: BEHAVIORAL/MENTAL HEALTH CLINIC | Facility: CLINIC | Age: 28
End: 2018-05-21
Payer: COMMERCIAL

## 2018-05-21 ENCOUNTER — TELEPHONE (OUTPATIENT)
Dept: PSYCHIATRY | Facility: CLINIC | Age: 28
End: 2018-05-21

## 2018-05-21 DIAGNOSIS — F50.9 EATING DISORDER: ICD-10-CM

## 2018-05-21 DIAGNOSIS — F33.1 MODERATE EPISODE OF RECURRENT MAJOR DEPRESSIVE DISORDER (HCC): ICD-10-CM

## 2018-05-21 DIAGNOSIS — F43.10 PTSD (POST-TRAUMATIC STRESS DISORDER): ICD-10-CM

## 2018-05-21 DIAGNOSIS — F41.1 GAD (GENERALIZED ANXIETY DISORDER): Primary | ICD-10-CM

## 2018-05-21 DIAGNOSIS — Z72.89 SELF-INJURIOUS BEHAVIOR: ICD-10-CM

## 2018-05-21 PROCEDURE — 90834 PSYTX W PT 45 MINUTES: CPT | Performed by: SOCIAL WORKER

## 2018-05-21 RX ORDER — PRAZOSIN HYDROCHLORIDE 1 MG/1
CAPSULE ORAL
Qty: 60 CAPSULE | Refills: 2 | Status: SHIPPED | OUTPATIENT
Start: 2018-05-21 | End: 2018-07-10 | Stop reason: SDUPTHER

## 2018-05-21 NOTE — TELEPHONE ENCOUNTER
Received electronic prior authorization request for sertraline under the [employer] ([facility])  Called number provided, spoke to representative, she said maybe pharmacy ran the medication under the wrong insurance, maybe should run it under her other insurance for coverage  Spoke to pharmacist, he said patient picked up medication yesterday when it was ran under her other insurance; he said we can disregard request, no prior authorization needed

## 2018-05-21 NOTE — PSYCH
Psychotherapy Provided: Individual Psychotherapy 50 minutes     Length of time in session: 50 minutes, follow up in 2 week    Goals addressed in session: Goal 2 and Goal 3      Pain:      moderate to severe    3    Current suicide risk : Low     D:   Met with Catalina ALATORRE; 'Things are pretty good it's strange actually ' Work remains very stressful  Shirlee Gitelman focused upon [employer], experiencing dates and sexual relations with a few men since break up with Rossi Adame 'always been a sexual person even as a child'  May be testing men and pushing the away  Explored historic trauma and sexuality growing up  Denied SIB or purging or SI               A: Katty Irving presented as anxious- mood and affect continue to become brighter with increased focus  She is very hard on herself and judgmental of her current acting out behaviors  Indicititive of traumatic               history  Denied high risk behaviors or self injurious behavior - demonstrating progress in processing and utilizing skills          P: Continue individual therapy  Ongoing discussion of above topics  Behavioral Health Treatment Plan [facility]: Diagnosis and Treatment Plan explained to Christine Suarez relates understanding diagnosis and is agreeable to Treatment Plan   Yes

## 2018-05-22 ENCOUNTER — APPOINTMENT (OUTPATIENT)
Dept: PHYSICAL THERAPY | Facility: CLINIC | Age: 28
End: 2018-05-22
Payer: OTHER MISCELLANEOUS

## 2018-05-22 DIAGNOSIS — F43.10 PTSD (POST-TRAUMATIC STRESS DISORDER): ICD-10-CM

## 2018-05-22 RX ORDER — FLUOXETINE HYDROCHLORIDE 20 MG/1
CAPSULE ORAL
Qty: 60 CAPSULE | Refills: 0 | OUTPATIENT
Start: 2018-05-22

## 2018-05-22 RX ORDER — SERTRALINE HYDROCHLORIDE 100 MG/1
TABLET, FILM COATED ORAL
Qty: 45 TABLET | Refills: 3 | Status: SHIPPED | OUTPATIENT
Start: 2018-05-22 | End: 2018-07-10 | Stop reason: SDUPTHER

## 2018-05-22 NOTE — TELEPHONE ENCOUNTER
Prior auth request generated by [facility] via [address]  To call [phone] to initiate request for Zoloft

## 2018-05-24 ENCOUNTER — APPOINTMENT (OUTPATIENT)
Dept: PHYSICAL THERAPY | Facility: CLINIC | Age: 28
End: 2018-05-24
Payer: OTHER MISCELLANEOUS

## 2018-05-30 ENCOUNTER — TELEPHONE (OUTPATIENT)
Dept: PSYCHIATRY | Facility: CLINIC | Age: 28
End: 2018-05-30

## 2018-05-30 NOTE — TELEPHONE ENCOUNTER
Received another request for a prior auth for sertraline to submit to [employer]  As per Telephone Encounter on 05/21/18, [address] is worker's comp coverage  I spoke with pharmacist at [facility] and reviewed same  Medication was processed with primary insurance [facility]), not difficulty  LM for [facility], so she is aware of processing issues  Given my number to call with questions

## 2018-05-31 ENCOUNTER — TELEPHONE (OUTPATIENT)
Dept: NEUROLOGY | Facility: CLINIC | Age: 28
End: 2018-05-31

## 2018-05-31 ENCOUNTER — CLINICAL SUPPORT (OUTPATIENT)
Dept: NEUROLOGY | Facility: CLINIC | Age: 28
End: 2018-05-31
Payer: OTHER MISCELLANEOUS

## 2018-05-31 DIAGNOSIS — IMO0002 CHRONIC MIGRAINE: Primary | ICD-10-CM

## 2018-05-31 PROCEDURE — 96372 THER/PROPH/DIAG INJ SC/IM: CPT | Performed by: PHYSICIAN ASSISTANT

## 2018-05-31 RX ORDER — KETOROLAC TROMETHAMINE 30 MG/ML
60 INJECTION, SOLUTION INTRAMUSCULAR; INTRAVENOUS ONCE
Status: COMPLETED | OUTPATIENT
Start: 2018-05-31 | End: 2018-05-31

## 2018-05-31 RX ADMIN — KETOROLAC TROMETHAMINE 60 MG: 30 INJECTION, SOLUTION INTRAMUSCULAR; INTRAVENOUS at 11:36

## 2018-05-31 NOTE — TELEPHONE ENCOUNTER
pt's mom called and states that pt woke up with a severe migraine this morning  she states that it started last night  she is asking if she could come in for an injection  she has to go to work at noon, she is asking if she can come in around 1030-11 if possible    [phone]

## 2018-06-04 ENCOUNTER — OFFICE VISIT (OUTPATIENT)
Dept: BEHAVIORAL/MENTAL HEALTH CLINIC | Facility: CLINIC | Age: 28
End: 2018-06-04
Payer: COMMERCIAL

## 2018-06-04 DIAGNOSIS — F43.10 PTSD (POST-TRAUMATIC STRESS DISORDER): ICD-10-CM

## 2018-06-04 DIAGNOSIS — F41.1 GAD (GENERALIZED ANXIETY DISORDER): ICD-10-CM

## 2018-06-04 DIAGNOSIS — F50.9 EATING DISORDER: ICD-10-CM

## 2018-06-04 DIAGNOSIS — F33.1 MODERATE EPISODE OF RECURRENT MAJOR DEPRESSIVE DISORDER (HCC): Primary | ICD-10-CM

## 2018-06-04 DIAGNOSIS — Z72.89 SELF-INJURIOUS BEHAVIOR: ICD-10-CM

## 2018-06-04 PROCEDURE — 90834 PSYTX W PT 45 MINUTES: CPT | Performed by: SOCIAL WORKER

## 2018-06-04 NOTE — PSYCH
Psychotherapy Provided: Individual Psychotherapy 50 minutes     Length of time in session: 50 minutes, follow up in 2 week    Goals addressed in session: Goal 1, Goal 2 and Goal 3      Pain:      none    0    Current suicide risk : Low     D:   Met with Catalina ALATORRE; 'Things are just ok ' Work 'is really bad'  One child remains very [place] focused upon [employer], experiencing dates and sexual relations  Food choices have not been consistent - restricting at times to loose weight  Going to [place] on 6/14 for 2 weeks to see best friend  Denied SIB or purging or SI            A: Hossein Floyd presented with anxious and euthymic mood   She remains very hard on herself and judgmental of herself  ED behaviors are expected due to ongoing hyperfocus on body image and 'looking desirable'  Denied high risk behaviors or self injurious behavior - demonstrating progress in processing and utilizing skills          P: Continue individual therapy  Ongoing discussion of above topics  Behavioral Health Treatment Plan [facility]: Diagnosis and Treatment Plan explained to Burton Gonsales relates understanding diagnosis and is agreeable to Treatment Plan   Yes

## 2018-06-11 ENCOUNTER — OFFICE VISIT (OUTPATIENT)
Dept: PAIN MEDICINE | Facility: CLINIC | Age: 28
End: 2018-06-11
Payer: OTHER MISCELLANEOUS

## 2018-06-11 VITALS
TEMPERATURE: 98.2 F | HEIGHT: 64 IN | WEIGHT: 169 LBS | HEART RATE: 80 BPM | SYSTOLIC BLOOD PRESSURE: 116 MMHG | BODY MASS INDEX: 28.85 KG/M2 | DIASTOLIC BLOOD PRESSURE: 78 MMHG

## 2018-06-11 DIAGNOSIS — G89.4 CHRONIC PAIN SYNDROME: Primary | ICD-10-CM

## 2018-06-11 DIAGNOSIS — M77.8 SHOULDER TENDINITIS, RIGHT: ICD-10-CM

## 2018-06-11 PROCEDURE — 99214 OFFICE O/P EST MOD 30 MIN: CPT | Performed by: NURSE PRACTITIONER

## 2018-06-11 NOTE — PROGRESS NOTES
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 work related injury in 2016  The patient continues with right scapular pain, which has made some improvement with physical therapy  However, she continues to be symptomatic  Therefore, we will repeat the right subacromial bursa injection  Complete risks and benefits including bleeding, infection, tissue reaction, nerve injury and allergic reaction were discussed  The approach was demonstrated using models and literature was provided  Verbal and written consent was obtained  I will continue her on her work related restrictions stating she can work, but cannot perform TACT II physical restraints  If she would failed to obtain relief from this procedure, I will reach out to Dr Oswaldo Ribeiro from orthopedics to see if she would be a candidate for PRP injections    The patient will follow-up in 12 weeks for medication prescription refill and reevaluation  The patient was advised to contact the office should their symptoms worsen in the interim  The patient was agreeable and verbalized an understanding  History of Present Illness: The patient is a 33 y o  female last seen on 3/14/18 who presents for a follow up office visit in regards to chronic pain secondary to right shoulder tendinitis and 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 work related injury in 2016     The patient currently reports ongoing scapular pain which is located on the right side  The pain is constant, and described as burning, shooting, numbness and pins and needles  She states that she has had some relief after completing physical therapy  She states she has more range of motion, and decreased muscle spasms    She was given an exercise program, which  she is performing at home  She is currently rating her pain a 7/10 on numeric rating scale    Current pain medications includes: Lyrica 150 mg twice a day, which is prescribed for fibromyalgia  She states does not help much with her shoulder pain  She did have excellent relief from the right subacromial bursa injection she had in February, but then had a injury a few days after the injection, and the pain returned  I have personally reviewed and/or updated the patient's past medical history, past surgical history, family history, social history, current medications, allergies, and vital signs today  Review of Systems:    Review of Systems   Respiratory: Negative for shortness of breath  Cardiovascular: Negative for chest pain  Gastrointestinal: Negative for constipation, diarrhea, nausea and vomiting  Musculoskeletal: Positive for joint swelling  Negative for arthralgias, gait problem and myalgias  Skin: Negative for rash  Neurological: Negative for dizziness, seizures and weakness  All other systems reviewed and are negative          Past Medical History:   Diagnosis Date    Ankylosing spondylitis ([facility] [address] )     Depression     Fibromyalgia        Past Surgical History:   Procedure Laterality Date    MANDIBLE SURGERY      REDUCTION MAMMAPLASTY         Family History   Problem Relation Age of Onset    Osteoporosis Mother    Levora Hastings Fibromyalgia Mother     Rheum arthritis Mother     Ulcerative colitis Mother     Cancer Father      adenocarcinoma    Hypertension Family     Ulcers Family      peptic    Kidney disease Family        Social History     Occupational History    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[address]     Social History Main Topics    Smoking status: Never Smoker    Smokeless tobacco: Never Used    Alcohol use No      Comment: Alcohol use 1- drinks every week per [employer]    Drug use: No    Sexual activity: Not on file      Comment: history of birth control         Current Outpatient Prescriptions:     ergocalciferol (VITAMIN D2) 50,000 units, Take 50,000 Units by mouth once a week, Disp: , Rfl:     levonorgestrel (MIRENA) 20 MCG/24HR IUD, 1 each by Intrauterine route once, Disp: , Rfl:     lidocaine (LIDODERM) 5 %, Place 3 patches on the skin daily Remove & Discard patch within 12 hours or as directed by Md  Can use up to 3 patches daily, Disp: 90 patch, Rfl: 5    prazosin (MINIPRESS) 1 mg capsule, TAKE 1MG NIGHTLY  AFTER 1 WEEK MAY INCREASE TO 2MG NIGHTLY , Disp: 60 capsule, Rfl: 2    pregabalin (LYRICA) 150 mg capsule, Take 50 mg by mouth 2 (two) times a day, Disp: , Rfl:     rizatriptan (MAXALT) 10 MG tablet, TAKE 1 TABLET BY MOUTH ON SET OF HEADACHE MAY REPEAT EVERY 2 HOURS AS NEEDED MAXIMUM 3 PILLS PER DAY, Disp: 9 tablet, Rfl: 3    sertraline (ZOLOFT) 100 mg tablet, Take 150mg daily  , Disp: 45 tablet, Rfl: 3    Tofacitinib Citrate (XELJANZ XR PO), Take by mouth, Disp: , Rfl:     Topiramate ER (TROKENDI XR) 200 MG CP24, One cap qhs , Disp: 30 capsule, Rfl: 2    valACYclovir (VALTREX) 500 mg tablet, Take 500 mg by mouth daily as needed, Disp: , Rfl:     No Known Allergies    Physical Exam:    /78   Pulse 80   Temp 98 2 °F (36 8 °C) (Oral)   Ht 5' 4" (1 626 m)   Wt 76 7 kg (169 lb)   BMI 29 01 kg/m²     Constitutional:normal, well developed, well nourished, alert, in no distress and non-toxic and no overt pain behavior  Eyes:anicteric  HEENT:grossly intact  Neck:supple, symmetric, trachea midline and no masses   Pulmonary:even and unlabored  Cardiovascular:No edema or pitting edema present  Skin:Normal without rashes or lesions and well hydrated  Psychiatric:Mood and affect appropriate  Neurologic:Cranial Nerves II-XII grossly intact  Musculoskeletal:normal    Right shoulder will full ROM  5/5 strength bilateral upper extremities    Pain with palpable trigger points right scapula  Imaging    MRI RIGHT SHOULDER     INDICATION: S49  91XA: Unspecified injury of right shoulder and upper arm, initial encounter  History taken directly from the electronic ordering system  Shoulder injury and pain     COMPARISON:  None      TECHNIQUE:   The following MR sequences were obtained of the right shoulder: Localizer, axial GRE/PD fat sat, oblique coronal T2 fat sat, oblique sagittal T1/T2 fat sat  Images were acquired on a 1 5 Annalise unit  Gadolinium was not used      FINDINGS:     SUBCUTANEOUS TISSUES: Normal     JOINT EFFUSION: None      ACROMION PROCESS: Normal      ROTATOR CUFF: Supraspinatus and infraspinatus insertional tendinosis with an anterior leading edge undersurface supraspinatus tendon tear measuring up to 10 mm in transverse dimension with tiny full-thickness component anteriorly  No significant tendon   retraction or fatty muscular infiltration      SUBACROMIAL/SUBDELTOID BURSA: Minimal bursal fluid evident       LONG HEAD OF BICEPS TENDON: There is mild tendinosis without tear      GLENOID LABRUM: Intact      GLENOHUMERAL JOINT: Intact      ACROMIOCLAVICULAR JOINT:  Normal      BONE MARROW SIGNAL: Normal      IMPRESSION:  1  Supraspinatus and infraspinous insertional tendinosis with a small undersurface supraspinatus tendon tear anteriorly with tiny full-thickness component  No tendon retraction or fatty muscular infiltration  2   Mild proximal biceps tendinosis without tear    3   Intact glenoid labrum      No orders to display         Orders Placed This Encounter   Procedures    Large joint arthrocentesis

## 2018-06-11 NOTE — LETTER
June 11, 2018     Patient: Beronica Decker   YOB: 1990   Date of Visit: 6/11/2018       To Whom it May Concern:    Beronica Decker is under my professional care  She was seen in my office on 6/11/2018  She may return to work on 6/11/18  She can not perform TACT II physical restraints due to her right shoulder condidtion  This will be ongoing until her next follow up in 3 months in August 2018  If you have any questions or concerns, please don't hesitate to call           Sincerely,          ORAL Griffin        CC: No Recipients

## 2018-06-12 ENCOUNTER — OFFICE VISIT (OUTPATIENT)
Dept: OBGYN CLINIC | Facility: CLINIC | Age: 28
End: 2018-06-12
Payer: COMMERCIAL

## 2018-06-12 VITALS — WEIGHT: 164.2 LBS | SYSTOLIC BLOOD PRESSURE: 110 MMHG | DIASTOLIC BLOOD PRESSURE: 70 MMHG | BODY MASS INDEX: 28.18 KG/M2

## 2018-06-12 DIAGNOSIS — Z11.3 SCREEN FOR STD (SEXUALLY TRANSMITTED DISEASE): ICD-10-CM

## 2018-06-12 DIAGNOSIS — N76.0 BACTERIAL VAGINOSIS: Primary | ICD-10-CM

## 2018-06-12 DIAGNOSIS — Z30.431 IUD CHECK UP: ICD-10-CM

## 2018-06-12 DIAGNOSIS — B96.89 BACTERIAL VAGINOSIS: Primary | ICD-10-CM

## 2018-06-12 PROCEDURE — 99214 OFFICE O/P EST MOD 30 MIN: CPT | Performed by: OBSTETRICS & GYNECOLOGY

## 2018-06-12 PROCEDURE — 87591 N.GONORRHOEAE DNA AMP PROB: CPT | Performed by: OBSTETRICS & GYNECOLOGY

## 2018-06-12 PROCEDURE — 87491 CHLMYD TRACH DNA AMP PROBE: CPT | Performed by: OBSTETRICS & GYNECOLOGY

## 2018-06-12 RX ORDER — METRONIDAZOLE 500 MG/1
500 TABLET ORAL 2 TIMES DAILY
Qty: 14 TABLET | Refills: 0 | Status: SHIPPED | OUTPATIENT
Start: 2018-06-12 | End: 2018-06-19

## 2018-06-12 NOTE — PROGRESS NOTES
Татьяна Covington was seen today for std testing  Diagnoses and all orders for this visit:    Screen for STD (sexually transmitted disease)    IUD check up    Bacterial vaginosis  -     metroNIDAZOLE (FLAGYL) 500 mg tablet; Take 1 tablet (500 mg total) by mouth 2 (two) times a day for 7 days         Plan  - will treat for BV  Vulvar hygiene handout given    -return to office for annual exam   -office will call you with results of STD testing  Diana López is a 33 y o  female here for a problem visit  She is interested in STD testing today  She has unprotected intercourse frequently  She has an intrauterine device in place  She states her partner "pulls out "  She states she had an instance of foul smell after intercourse  She complains of some discharge at times  She complains of frequent funny odor in the vaginal area  She has been using feminine wipes and summer's Glenny, which I counseled against      All herpes blood work was negative, therefore valacyclovir is not indicated        Patient Active Problem List   Diagnosis    Neuropathic pain    Right shoulder pain    Moderate episode of recurrent major depressive disorder (HCC)    Eating disorder    Self-injurious behavior    PTSD (post-traumatic stress disorder)    SHIRLEY (generalized anxiety disorder)    Abdominal pain    Acute bacterial conjunctivitis of both eyes    Ankylosing spondylitis (HCC)    Cervical radiculopathy    Chronic migraine    Concussion    Extensor tendinitis of foot    Fatigue    Fibromyalgia    Herpes simplex infection    Microscopic hematuria    Myofascial pain    Numbness and tingling    Rheumatoid arthritis of multiple sites with negative rheumatoid factor ([facility] [address] )    Right ovarian cyst    HORTENCIA (stress urinary incontinence, female)    Vitamin D deficiency    Head injury due to trauma    Sprain of left wrist    Closed nondisplaced fracture of neck of left radius         Gynecologic History  No LMP recorded  Patient has had an implant  Contraception: IUD  Last Pap: 7/2016  Results were: normal    Obstetric History  OB History   No data available       Past Medical/Surgical/Family/Social History  The following portions of the patient's history were reviewed and updated as appropriate: allergies, current medications, past family history, past medical history, past social history, past surgical history and problem list     Allergies  Patient has no known allergies  Medications    Current Outpatient Prescriptions:     ergocalciferol (VITAMIN D2) 50,000 units, Take 50,000 Units by mouth once a week, Disp: , Rfl:     levonorgestrel (MIRENA) 20 MCG/24HR IUD, 1 each by Intrauterine route once, Disp: , Rfl:     lidocaine (LIDODERM) 5 %, Place 3 patches on the skin daily Remove & Discard patch within 12 hours or as directed by Md  Can use up to 3 patches daily, Disp: 90 patch, Rfl: 5    prazosin (MINIPRESS) 1 mg capsule, TAKE 1MG NIGHTLY  AFTER 1 WEEK MAY INCREASE TO 2MG NIGHTLY , Disp: 60 capsule, Rfl: 2    pregabalin (LYRICA) 150 mg capsule, Take 50 mg by mouth 2 (two) times a day, Disp: , Rfl:     rizatriptan (MAXALT) 10 MG tablet, TAKE 1 TABLET BY MOUTH ON SET OF HEADACHE MAY REPEAT EVERY 2 HOURS AS NEEDED MAXIMUM 3 PILLS PER DAY, Disp: 9 tablet, Rfl: 3    sertraline (ZOLOFT) 100 mg tablet, Take 150mg daily  , Disp: 45 tablet, Rfl: 3    Tofacitinib Citrate (XELJANZ XR PO), Take by mouth, Disp: , Rfl:     Topiramate ER (TROKENDI XR) 200 MG CP24, One cap qhs , Disp: 30 capsule, Rfl: 2    valACYclovir (VALTREX) 500 mg tablet, Take 500 mg by mouth daily as needed, Disp: , Rfl:     metroNIDAZOLE (FLAGYL) 500 mg tablet, Take 1 tablet (500 mg total) by mouth 2 (two) times a day for 7 days, Disp: 14 tablet, Rfl: 0      Review of Systems  Constitutional: no fever, feels well, no tiredness, no recent weight gain or loss  Breasts:no complaints of breast pain, breast lump, or nipple discharge  Gastrointestinal: no complaints of abdominal pain, constipation, nausea, vomiting, or diarrhea or bloody stools  Genitourinary : no complaints of dysuria, incontinence, pelvic pain, dysmenorrhea     Objective     /70   Wt 74 5 kg (164 lb 3 2 oz)   Breastfeeding? No   BMI 28 18 kg/m²     General: alert and oriented, in no acute distress  Vulva: normal  Vagina: thin grey discharge  Cervix:  present, No cmt, iud strings present  + foul smelling discharge

## 2018-06-12 NOTE — PATIENT INSTRUCTIONS
Guidelines for Vulvar Skin Care    NOTE:     The goal is to promote healthy vulvar skin  This is done by decreasing and/or removing any chemicals, moisture, or rubbing (friction)  Any products listed below have been suggested for use because of their past success in helping to decrease or relieve vulvar/vaginal itching and burning  LAUNDRY PRODUCTS   Use a detergent free of dyes, enzymes and perfumes (such as ALL-Free and Clear or Earth-Rite) on any clothing that comes in contact with your vulva such as your underwear, exercise clothes, towels, or pajama bottoms  Use 1/3 to 1/2 the suggested amount per load  Other clothing may be washed in the laundry soap of your choice   Do not use a fabric softener in the washer or dryer on these articles of clothing  If you do use dryer sheets with the rest of your clothes, for any loads, you must hang dry your underwear, towels, and any other clothing that comes in contact with your vulva   Stain Removing Products  Soak and rinse in clear water all underwear and towels on which you have used a stain removing product  Then wash in your regular washing cycle  This removes as much of the product as possible  CLOTHING   Wear white all cotton underwear, not nylon with a cotton crotch  Cotton allows air in and moisture out   Avoid pantyhose  If you must wear them, either cut out the rachel crotch (if you cut out the crotch be sure to leave about 1/4 to 1/2 inch of fabric from the seam to prevent running) or wear thigh high hose  Many stores now carry thigh high nylons   Avoid tight clothing, especially clothing made of synthetic fabrics  Remove wet bathing and exercise clothing as soon as you can  BATHING AND HYGIENE   Avoid bath soaps, lotions, gels, etc  which contain perfumes  These may smell nice but can be irritating  This includes many baby products and feminine hygiene products marked "gentle" or "mild"   Dove-Hypoallergenic, Neutrogena, Basis, or Pears are the soaps we suggest  Do not use soap directly on the vulvar skin; just warm water and your hand will keep the vulvar area clean without irritating the skin   Avoid all bubble baths, bath salts and scented oils  You may apply a neutral (unscented, non-perfumed) oil such as Krys Oil to damp skin after getting out of the tub or shower  Do not apply oils directly to the vulva   Do not scrub vulvar skin with a washcloth, washing with your hand and warm water is enough for good cleaning   Pat dry rather than rubbing with a towel  Or use a hairdryer on a cool setting to dry the vulva   Baking Soda soaks  Soak in lukewarm (not hot) bath water with 4-5 tablespoons of baking soda to help soothe vulvar itching and burning  Soak 1 to 3 times a day for 10-15 minutes   Cool Compresses: Apply cool compresses to the vulva for 15-20 minutes at a time, up to three times per day for burning or itching  Do not use for prolonged amounts of time as this can lead to damage to the skin   Use white, unscented toilet paper  If paper has a perfumed scent or lotion, avoid using it   Avoid all feminine hygiene sprays, perfumes, adult, or baby wipes  Pour lukewarm water over the vulva after urinating if urine causes burning of the skin  Pat dry rather than rubbing with a towel   Avoid the use of deodorized pads and tampons  Tampons should be used when the blood flow is heavy enough to soak one tampon in four hours or less  Tampons are safe for most women, but wearing them too long or when the blood flow is light may result in vaginal infection, increased discharge, odor, or toxic shock syndrome  Also, use only pads that have a cotton liner that comes in contact with your skin  You can obtain all cotton pads from [employer]   Avoid all over the counter creams or ointments, except A&D Ointment   Ask your health care provider first    Small amounts of A&D Ointment may be applied to your vulva as often as needed to protect the skin  It may also help to decrease skin irritation during your period and when you urinate  Brands that have been helpful are the [place] brand, [employer]  brand, Rugby brand, or [facility] brand   DO NOT DOUCHE  Baking soda soaks will help rinse away extra discharge and help with odor   DO NOT SHAVE the vulvar area   Some women may have problems with chronic dampness  Keeping dry is important   Choose cotton fabrics whenever you can   Keep an extra pair of underwear with you in a small bag and change if you become damp during the day at work/school   Gold Bond Powder or Zeosorb Powder may be applied to the vulva and groin area one to two times per day to help absorb moisture  Dryness and irritation of the vagina and vulva may be helped by using an emoillent  Use a small amount of a pure vegetable oil such as Crisco or olive oil  The vegetable oils contain no chemicals to irritate vulvar/vaginal skin  Vegetable oils will rinse away with water and will not increase your chances of infection  You can also use Vitamin E oil or coconut oil  You can apply the emoillent as often as needed for dryness and irritation; I recommend at least once daily, but you can apply more frequently  You can also use the emollient during intercourse  Water-based products like K-Y Jelly are helpful, but may tend to dry before intercourse is over and also contain chemicals that can irritate your vulvar skin  It may be helpful to use a non-lubricated, non-spermicidal condom, and use vegetable oil as the lubricant  This will help keep the semen off the skin which can decrease burning and irritation after intercourse

## 2018-06-15 ENCOUNTER — TELEPHONE (OUTPATIENT)
Dept: NEUROLOGY | Facility: CLINIC | Age: 28
End: 2018-06-15

## 2018-06-15 LAB
CHLAMYDIA DNA CVX QL NAA+PROBE: NORMAL
N GONORRHOEA DNA GENITAL QL NAA+PROBE: NORMAL

## 2018-06-15 NOTE — TELEPHONE ENCOUNTER
Yes,  Sergo Dunn from [place] is at [phone],  Vanesa Armando    From: French Murillo   Sent: Friday, Ida 15, 2018 9:11 AM  To: Mariela Mosher: Manuel Barrett  Subject: RE: narrative    Louise Vegas,    Do you have the telephone number for  Sergo Dunn from [facility]? I need to call them back and Dr Claudell Carmin said she was not provided a phone number from your original message  Carmine Hansen, RN, BSN  Clinical Practice Supervisor   [facility]  [address],  [place], [address]  [phone]        From: Yeison Mendeze: Friday, Ida 15, 2018 9:00 AM  To: French Murillo  Subject: RE: narrative    Please let the office know that I will not be doing a narrative  Please send them the medical  records      Thanks     From: French Murillo   Sent: Friday, Ida 15, 2018 8:54 AM  To: Melissa Chaparro: Westley Lincoln  Subject: RE: narrative    Good Morning Dr Claudell Carmin,    In my experience if the provider does not want to complete the narrative then we would just call the  office and let them know and provide them with medical records instead  If you would like to complete the narrative for this patient then yes we would bill them and would have to reserve time for you to complete it  Please let me know how you would like to proceed? Carmine Hansen RN, BSN  Clinical Practice Supervisor   [facility]  [address],  [place], [address]  [phone]        From: Jorge Luis Menon   Sent: Friday, Ida 15, 2018 8:36 AM  To: Anibal Hartman: Westley Lincoln  Subject: narrative        Good morning,     I received a request for this narrative from Vanesa Armando in [place] [facility]     What is the process to do this?   in the past , there was a upfront fee required and I am given at least 1 to ½ hrs to review the chart     Honestly , I do not have time to do this unless some of the patients are moved  Where is the number ? Request from [place] :       Radha Paulino from [address] was hoping that you could do a narrative report for this pt  Braden Sharpe     They need from 11/17/16 to Present      She can be reached at the number above if you have questions    Thanks,  Shelbie Manzo

## 2018-06-15 NOTE — TELEPHONE ENCOUNTER
I called Padmaja Gabriel and advised her that Dr Andreina Smith has declined to do narrative at this time but is willing to provide medical records for the time period requested  Padmaja Gabriel will fax a release of information

## 2018-06-18 ENCOUNTER — OFFICE VISIT (OUTPATIENT)
Dept: NEUROLOGY | Facility: CLINIC | Age: 28
End: 2018-06-18

## 2018-06-18 DIAGNOSIS — S09.90XS TRAUMATIC INJURY OF HEAD, SEQUELA: Primary | ICD-10-CM

## 2018-06-29 ENCOUNTER — OFFICE VISIT (OUTPATIENT)
Dept: BEHAVIORAL/MENTAL HEALTH CLINIC | Facility: CLINIC | Age: 28
End: 2018-06-29
Payer: COMMERCIAL

## 2018-06-29 DIAGNOSIS — F41.1 GAD (GENERALIZED ANXIETY DISORDER): ICD-10-CM

## 2018-06-29 DIAGNOSIS — Z72.89 SELF-INJURIOUS BEHAVIOR: Primary | ICD-10-CM

## 2018-06-29 DIAGNOSIS — F50.9 EATING DISORDER: ICD-10-CM

## 2018-06-29 DIAGNOSIS — F33.1 MODERATE EPISODE OF RECURRENT MAJOR DEPRESSIVE DISORDER (HCC): ICD-10-CM

## 2018-06-29 DIAGNOSIS — F43.10 PTSD (POST-TRAUMATIC STRESS DISORDER): ICD-10-CM

## 2018-06-29 DIAGNOSIS — M79.7 FIBROMYALGIA: Primary | ICD-10-CM

## 2018-06-29 PROCEDURE — 90834 PSYTX W PT 45 MINUTES: CPT | Performed by: SOCIAL WORKER

## 2018-06-29 RX ORDER — PREGABALIN 150 MG/1
150 CAPSULE ORAL 2 TIMES DAILY
Qty: 14 CAPSULE | Refills: 0 | OUTPATIENT
Start: 2018-06-29 | End: 2018-10-15 | Stop reason: SDUPTHER

## 2018-06-29 NOTE — TELEPHONE ENCOUNTER
Lyrica shipping out today - will get to her in 3-5 days via [employer]  Needs a refill in the meantime  Leaving tomorrow for one week

## 2018-07-09 ENCOUNTER — OFFICE VISIT (OUTPATIENT)
Dept: BEHAVIORAL/MENTAL HEALTH CLINIC | Facility: CLINIC | Age: 28
End: 2018-07-09
Payer: COMMERCIAL

## 2018-07-09 DIAGNOSIS — F33.1 MODERATE EPISODE OF RECURRENT MAJOR DEPRESSIVE DISORDER (HCC): ICD-10-CM

## 2018-07-09 DIAGNOSIS — Z72.89 SELF-INJURIOUS BEHAVIOR: ICD-10-CM

## 2018-07-09 DIAGNOSIS — F43.10 PTSD (POST-TRAUMATIC STRESS DISORDER): ICD-10-CM

## 2018-07-09 DIAGNOSIS — F50.9 EATING DISORDER: ICD-10-CM

## 2018-07-09 DIAGNOSIS — F41.1 GAD (GENERALIZED ANXIETY DISORDER): Primary | ICD-10-CM

## 2018-07-09 PROCEDURE — 90834 PSYTX W PT 45 MINUTES: CPT | Performed by: SOCIAL WORKER
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
## 2018-07-10 ENCOUNTER — OFFICE VISIT (OUTPATIENT)
Dept: PSYCHIATRY | Facility: CLINIC | Age: 28
End: 2018-07-10
Payer: COMMERCIAL

## 2018-07-10 VITALS
DIASTOLIC BLOOD PRESSURE: 78 MMHG | HEART RATE: 71 BPM | WEIGHT: 168.5 LBS | BODY MASS INDEX: 28.92 KG/M2 | RESPIRATION RATE: 14 BRPM | SYSTOLIC BLOOD PRESSURE: 107 MMHG

## 2018-07-10 DIAGNOSIS — F43.10 PTSD (POST-TRAUMATIC STRESS DISORDER): ICD-10-CM

## 2018-07-10 DIAGNOSIS — F50.9 EATING DISORDER: ICD-10-CM

## 2018-07-10 DIAGNOSIS — F33.1 MODERATE EPISODE OF RECURRENT MAJOR DEPRESSIVE DISORDER (HCC): Primary | ICD-10-CM

## 2018-07-10 DIAGNOSIS — F41.1 GAD (GENERALIZED ANXIETY DISORDER): ICD-10-CM

## 2018-07-10 PROCEDURE — 99214 OFFICE O/P EST MOD 30 MIN: CPT | Performed by: PSYCHIATRY & NEUROLOGY

## 2018-07-10 RX ORDER — PRAZOSIN HYDROCHLORIDE 1 MG/1
3 CAPSULE ORAL
Qty: 90 CAPSULE | Refills: 3 | Status: SHIPPED | OUTPATIENT
Start: 2018-07-10 | End: 2018-10-08 | Stop reason: SDUPTHER

## 2018-07-10 RX ORDER — SERTRALINE HYDROCHLORIDE 100 MG/1
200 TABLET, FILM COATED ORAL DAILY
Qty: 60 TABLET | Refills: 3 | Status: SHIPPED | OUTPATIENT
Start: 2018-07-10 | End: 2018-10-08 | Stop reason: SDUPTHER

## 2018-07-17 DIAGNOSIS — G43.109 MIGRAINE WITH AURA AND WITHOUT STATUS MIGRAINOSUS, NOT INTRACTABLE: ICD-10-CM

## 2018-07-17 RX ORDER — RIZATRIPTAN BENZOATE 10 MG/1
TABLET ORAL
Qty: 9 TABLET | Refills: 3 | Status: SHIPPED | OUTPATIENT
Start: 2018-07-17 | End: 2018-12-04 | Stop reason: SDUPTHER

## 2018-07-20 ENCOUNTER — PROCEDURE VISIT (OUTPATIENT)
Dept: PAIN MEDICINE | Facility: CLINIC | Age: 28
End: 2018-07-20
Payer: OTHER MISCELLANEOUS

## 2018-07-20 VITALS — WEIGHT: 164 LBS | HEIGHT: 64 IN | BODY MASS INDEX: 28 KG/M2

## 2018-07-20 DIAGNOSIS — M25.511 CHRONIC RIGHT SHOULDER PAIN: Primary | ICD-10-CM

## 2018-07-20 DIAGNOSIS — G89.29 CHRONIC RIGHT SHOULDER PAIN: Primary | ICD-10-CM

## 2018-07-20 PROCEDURE — 20611 DRAIN/INJ JOINT/BURSA W/US: CPT | Performed by: ANESTHESIOLOGY

## 2018-07-20 RX ORDER — METHYLPREDNISOLONE ACETATE 40 MG/ML
40 INJECTION, SUSPENSION INTRA-ARTICULAR; INTRALESIONAL; INTRAMUSCULAR; SOFT TISSUE ONCE
Status: COMPLETED | OUTPATIENT
Start: 2018-07-20 | End: 2018-07-20

## 2018-07-20 RX ADMIN — METHYLPREDNISOLONE ACETATE 40 MG: 40 INJECTION, SUSPENSION INTRA-ARTICULAR; INTRALESIONAL; INTRAMUSCULAR; SOFT TISSUE at 14:22

## 2018-07-20 NOTE — PROGRESS NOTES
Pre-procedure Diagnosis:   1  Chronic right shoulder pain      Post-procedure Diagnosis:   1  Chronic right shoulder pain      Operation Title(s):  Right subacromial bursa injection under ultrasound guidance  Attending Surgeon:   Roseanne London MD  Anesthesia:   Local    Indications: The patient is a 33y o  year-old female with a diagnosis of   1  Chronic right shoulder pain      The patient's history and physical exam were reviewed  The risks, benefits and alternatives to the procedure were discussed, and all questions were answered to the patient's satisfaction  The patient agreed to proceed, and written informed consent was obtained  Procedure in Detail: The patient was brought into the exam room and placed in the sitting position on the exam room chair  The right shoulder was prepped with chloraprep and draped in a sterile manner  A sterile ultrasound probe cover and gel was placed over a 3 75 MHz curvilinear transducer probe  The probe was placed over the shoulder to visualize the subdeltoid/subacromial bursal region  Optimal needle path was determined and the skin and subcutaneous tissues in the area was anesthetized with 1% lidocaine  Then, under ultrasound guidance, a 2 inch ultrasound needle was advanced until the needle entered the bursa region  After visualization of the tip in the target area and negative aspiration for blood, a solution consisting of 3 mL 0 25% bupivacaine and 1 mL Depo-Medrol (40mg/ml) was easily injected  The patient's shoulder was cleansed and a bandage was placed over the sites of needle insertion  Disposition: The patient tolerated the procedure well and there were no apparent complications  The patient was given written discharge instructions for the procedure

## 2018-07-22 DIAGNOSIS — IMO0002 CHRONIC MIGRAINE: ICD-10-CM

## 2018-07-22 DIAGNOSIS — F43.10 PTSD (POST-TRAUMATIC STRESS DISORDER): ICD-10-CM

## 2018-07-22 DIAGNOSIS — S09.90XS TRAUMATIC INJURY OF HEAD, SEQUELA: ICD-10-CM

## 2018-07-23 RX ORDER — TOPIRAMATE 200 MG/1
CAPSULE, EXTENDED RELEASE ORAL
Qty: 30 CAPSULE | Refills: 2 | Status: SHIPPED | OUTPATIENT
Start: 2018-07-23 | End: 2018-08-03 | Stop reason: DRUGHIGH

## 2018-07-26 ENCOUNTER — OFFICE VISIT (OUTPATIENT)
Dept: NEUROLOGY | Facility: CLINIC | Age: 28
End: 2018-07-26
Payer: OTHER MISCELLANEOUS

## 2018-07-26 VITALS
DIASTOLIC BLOOD PRESSURE: 74 MMHG | BODY MASS INDEX: 28.51 KG/M2 | WEIGHT: 167 LBS | HEART RATE: 80 BPM | HEIGHT: 64 IN | SYSTOLIC BLOOD PRESSURE: 112 MMHG

## 2018-07-26 DIAGNOSIS — IMO0002 CHRONIC MIGRAINE: ICD-10-CM

## 2018-07-26 DIAGNOSIS — S09.90XS TRAUMATIC INJURY OF HEAD, SEQUELA: Primary | ICD-10-CM

## 2018-07-26 PROCEDURE — 96372 THER/PROPH/DIAG INJ SC/IM: CPT | Performed by: PHYSICIAN ASSISTANT

## 2018-07-26 PROCEDURE — 99214 OFFICE O/P EST MOD 30 MIN: CPT | Performed by: PHYSICIAN ASSISTANT

## 2018-07-26 RX ORDER — LAMOTRIGINE 25 MG/1
TABLET ORAL
Qty: 120 TABLET | Refills: 2 | Status: SHIPPED | OUTPATIENT
Start: 2018-07-26 | End: 2018-11-27 | Stop reason: SDUPTHER

## 2018-07-26 RX ORDER — TOFACITINIB 11 MG/1
TABLET, FILM COATED, EXTENDED RELEASE ORAL
COMMUNITY
Start: 2018-07-18 | End: 2018-07-26 | Stop reason: SDUPTHER

## 2018-07-26 RX ORDER — KETOROLAC TROMETHAMINE 30 MG/ML
60 INJECTION, SOLUTION INTRAMUSCULAR; INTRAVENOUS ONCE
Status: COMPLETED | OUTPATIENT
Start: 2018-07-26 | End: 2018-07-26

## 2018-07-26 RX ADMIN — KETOROLAC TROMETHAMINE 60 MG: 30 INJECTION, SOLUTION INTRAMUSCULAR; INTRAVENOUS at 15:06

## 2018-07-26 NOTE — LETTER
July 26, 2018     Patient: Felicitas Rm   YOB: 1990   Date of Visit: 7/26/2018       To Whom It May Concern: It is my medical opinion that Felicitas Rm should be allowed to keep the light off in her room to prevent worsening migraine headaches and other symptoms  If you have any questions or concerns, please don't hesitate to call           Sincerely,        Nisa Douglas PA-C    CC: No Recipients

## 2018-07-26 NOTE — PROGRESS NOTES
Patient ID: Leonie Miller is a 33 y o  female  Assessment/Plan:    No problem-specific Assessment & Plan notes found for this encounter  Diagnoses and all orders for this visit:    Traumatic injury of head, sequela  -     Ambulatory Referral to Massage Therapist; Future  -     lamoTRIgine (LaMICtal) 25 mg tablet; One tab q h s  x1 week, then 2 tabs q h s  x1 week, then 3 tabs q h s  x1 week, then 4 tabs q h s  Call with rash  -- Lamictal s/e reviewed  -     ketorolac (TORADOL) 60 mg/2 mL IM injection 60 mg; Inject 2 mL (60 mg total) into a muscle once     Chronic migraine  -     Ambulatory Referral to Massage Therapist; Future  -     lamoTRIgine (LaMICtal) 25 mg tablet; One tab q h s  x1 week, then 2 tabs q h s  x1 week, then 3 tabs q h s  x1 week, then 4 tabs q h s  Call with rash  -     ketorolac (TORADOL) 60 mg/2 mL IM injection 60 mg; Inject 2 mL (60 mg total) into a muscle once   -     Discontinue: onabotulinumtoxin A (BOTOX) injection 200 Units; Inject 200 Units into a muscle once   -     Chemodenervation; Future (will trial Botox if Lamictal fails, informed consent provided to her today for botox)    Other orders  -     Discontinue: XELJANZ XR 11 MG TB24;           Lamictal for prevention  Will decrease trokendi and wean/ d/c that if lamictal helpful  Continue maxalt for abortive tx  Botox back up if lamictal fails  Also recommended PT, OT and speech  She will consider once back into her school schedule as this takes up much of her time  Subjective:    HPI     Leonie Miller is not doing as well as last time I saw her, although denies any new head injuries  She has been having more headaches as described at the last visit and more memory/ cognitive issues  For ex, it is taking her "forever to read" things, she finds it difficult to read up close on cell phone, books, etc, and has constant word finding issues      She did an 8-day long summer class for work, teaching students, and found it extremely overwhelming; she worked 8- 3; she [de-identified] finished yesterday and goes back to work August 17  Per her  requirement, she saw another neurologist who told her that these sxs may be something she has to deal with for the rest of her life  She contineus to have chronic migraine headaches >/15 days per month and last the entire day  Has tried and failed so far: topamax, trokendi XR, TCAs, lyrica, zoloft, prozac and now lamictal  BP too low to initiate BB or CCB and she already has lightheadedness  --    The following portions of the patient's history were reviewed and updated as appropriate: She  has a past medical history of [facility] Depression; and Fibromyalgia    She   Patient Active Problem List    Diagnosis Date Noted    Sprain of left wrist 04/10/2018    Closed nondisplaced fracture of neck of left radius 04/10/2018    Head injury due to trauma 03/29/2018    Moderate episode of recurrent major depressive disorder ([facility] ) 02/05/2018    Eating disorder 02/05/2018    Self-injurious behavior 02/05/2018    PTSD (post-traumatic stress disorder) 02/05/2018    SHIRLEY (generalized anxiety disorder) 02/05/2018    Neuropathic pain 01/27/2018    Chronic migraine 01/15/2018    Right shoulder pain 01/05/2018    Herpes simplex infection 12/28/2017    Fibromyalgia 12/27/2017    Acute bacterial conjunctivitis of both eyes 10/20/2017    Numbness and tingling 09/29/2017    Right ovarian cyst 08/14/2017    Microscopic hematuria 08/11/2017    Abdominal pain 08/10/2017    Concussion 07/26/2017    Cervical radiculopathy 07/24/2017    HORTENCIA (stress urinary incontinence, female) 07/20/2017    Rheumatoid arthritis of multiple sites with negative rheumatoid factor ([facility] ) 04/07/2016    Vitamin D deficiency 08/24/2015    Fatigue 07/30/2015    Myofascial pain 07/30/2015    Extensor tendinitis of foot 11/04/2014    [facility] 12/18/2013     She  has a past surgical history that includes Mandible surgery and Reduction mammaplasty  Her family history includes Cancer in her father; Fibromyalgia in her mother; Hypertension in her family; Kidney disease in her family; Osteoporosis in her mother; Rheum arthritis in her mother; Ulcerative colitis in her mother; Ulcers in her family  She  reports that she has never smoked  She has never used smokeless tobacco  She reports that she does not drink alcohol or use drugs  Current Outpatient Prescriptions   Medication Sig Dispense Refill    ergocalciferol (VITAMIN D2) 50,000 units Take 50,000 Units by mouth once a week      levonorgestrel (MIRENA) 20 MCG/24HR IUD 1 each by Intrauterine route once      lidocaine (LIDODERM) 5 % Place 3 patches on the skin daily Remove & Discard patch within 12 hours or as directed by Md  Can use up to 3 patches daily 90 patch 5    prazosin (MINIPRESS) 1 mg capsule Take 3 capsules (3 mg total) by mouth daily at bedtime 90 capsule 3    pregabalin (LYRICA) 150 mg capsule Take 1 capsule (150 mg total) by mouth 2 (two) times a day for 7 days 14 capsule 0    rizatriptan (MAXALT) 10 MG tablet May repeat in 2 hours if needed 9 tablet 3    sertraline (ZOLOFT) 100 mg tablet Take 2 tablets (200 mg total) by mouth daily 60 tablet 3    Tofacitinib Citrate (XELJANZ XR PO) Take by mouth      TROKENDI  MG CP24 TAKE ONE CAPSULE BY MOUTH AT BEDTIME 30 capsule 2    lamoTRIgine (LaMICtal) 25 mg tablet One tab q h s  x1 week, then 2 tabs q h s  x1 week, then 3 tabs q h s  x1 week, then 4 tabs q h s  Call with rash  120 tablet 2    valACYclovir (VALTREX) 500 mg tablet Take 500 mg by mouth daily as needed       No current facility-administered medications for this visit        Current Outpatient Prescriptions on File Prior to Visit   Medication Sig    ergocalciferol (VITAMIN D2) 50,000 units Take 50,000 Units by mouth once a week    levonorgestrel (MIRENA) 20 MCG/24HR IUD 1 each by Intrauterine route once   Leny Monzon lidocaine (LIDODERM) 5 % Place 3 patches on the skin daily Remove & Discard patch within 12 hours or as directed by Md  Can use up to 3 patches daily    prazosin (MINIPRESS) 1 mg capsule Take 3 capsules (3 mg total) by mouth daily at bedtime    pregabalin (LYRICA) 150 mg capsule Take 1 capsule (150 mg total) by mouth 2 (two) times a day for 7 days    rizatriptan (MAXALT) 10 MG tablet May repeat in 2 hours if needed    sertraline (ZOLOFT) 100 mg tablet Take 2 tablets (200 mg total) by mouth daily    Tofacitinib Citrate (XELJANZ XR PO) Take by mouth    TROKENDI  MG CP24 TAKE ONE CAPSULE BY MOUTH AT BEDTIME    valACYclovir (VALTREX) 500 mg tablet Take 500 mg by mouth daily as needed     No current facility-administered medications on file prior to visit  She has No Known Allergies            Objective:    Blood pressure 112/74, pulse 80, height 5' 4" (1 626 m), weight 75 8 kg (167 lb), not currently breastfeeding  Physical Exam   Constitutional: She is oriented to person, place, and time  She appears well-developed and well-nourished  HENT:   Head: Normocephalic and atraumatic  Eyes:   Photophobic  Neck: Normal range of motion  Neck supple  Musculoskeletal: Normal range of motion  5/5 t/o  Neurological: She is alert and oriented to person, place, and time  She displays normal reflexes  No cranial nerve deficit  Coordination normal    Psychiatric: She has a normal mood and affect  Her behavior is normal  Judgment and thought content normal    Nursing note and vitals reviewed  Neurological Exam      ROS:    Review of Systems   Constitutional: Negative  Negative for appetite change and fever  HENT: Negative  Negative for hearing loss, tinnitus, trouble swallowing and voice change  Eyes: Negative  Negative for photophobia and pain  Respiratory: Negative  Negative for shortness of breath  Cardiovascular: Negative  Negative for palpitations     Gastrointestinal: Positive for diarrhea  Negative for nausea and vomiting  Endocrine: Negative  Negative for cold intolerance and heat intolerance  Genitourinary: Negative  Negative for dysuria, frequency and urgency  Musculoskeletal: Positive for neck pain  Negative for myalgias  Skin: Negative  Negative for rash  Neurological: Positive for light-headedness, numbness and headaches  Negative for dizziness, tremors, seizures, syncope, facial asymmetry, speech difficulty and weakness  Tingling   Hematological: Negative  Does not bruise/bleed easily  Psychiatric/Behavioral: Positive for confusion and sleep disturbance  Negative for hallucinations  The patient is nervous/anxious  Memory problems, depression     Review of systems, Past medical history, Surgical history, Family history, Social history and Medication history were reviewed and otherwise unremarkable from a neurological perspective

## 2018-07-27 ENCOUNTER — TELEPHONE (OUTPATIENT)
Dept: NEUROLOGY | Facility: CLINIC | Age: 28
End: 2018-07-27

## 2018-07-27 ENCOUNTER — TELEPHONE (OUTPATIENT)
Dept: PAIN MEDICINE | Facility: CLINIC | Age: 28
End: 2018-07-27

## 2018-07-27 NOTE — TELEPHONE ENCOUNTER
lmom awaiting a call back- RE: schedule Botox appointment      (new start with Dr Brenda Mosqueda)    Please transfer directly to me to schedule- Was looking to schedule the patient on 9/14/18 at 12pm  Thanks

## 2018-07-27 NOTE — TELEPHONE ENCOUNTER
Patient is calling back stating that she has about 30 percent relief since procedure  Says she still has the burning and numbness

## 2018-07-30 NOTE — TELEPHONE ENCOUNTER
[facility]-     Please advise, patient called back and states that she does not want to start Botox at this time  To her understanding she thought she would be trying out her new medication and if that does not help then proceeding with Botox  Please advise

## 2018-07-30 NOTE — TELEPHONE ENCOUNTER
Called and spoke with the patient- made her aware okay to hold off on Botox for now  Patient is agreeable and will give us a call if the new medication does not work  I also explained the process of ordering Botox if/when the patient decides to get the Botox  David FYI- patient does not wish to schedule at this time  Thank you !

## 2018-07-31 ENCOUNTER — OFFICE VISIT (OUTPATIENT)
Dept: BEHAVIORAL/MENTAL HEALTH CLINIC | Facility: CLINIC | Age: 28
End: 2018-07-31
Payer: COMMERCIAL

## 2018-07-31 ENCOUNTER — TELEPHONE (OUTPATIENT)
Dept: PSYCHIATRY | Facility: CLINIC | Age: 28
End: 2018-07-31

## 2018-07-31 DIAGNOSIS — F41.1 GAD (GENERALIZED ANXIETY DISORDER): ICD-10-CM

## 2018-07-31 DIAGNOSIS — F43.10 PTSD (POST-TRAUMATIC STRESS DISORDER): ICD-10-CM

## 2018-07-31 DIAGNOSIS — Z72.89 SELF-INJURIOUS BEHAVIOR: ICD-10-CM

## 2018-07-31 DIAGNOSIS — F50.9 EATING DISORDER: ICD-10-CM

## 2018-07-31 DIAGNOSIS — F33.1 MODERATE EPISODE OF RECURRENT MAJOR DEPRESSIVE DISORDER (HCC): Primary | ICD-10-CM

## 2018-07-31 PROCEDURE — 90834 PSYTX W PT 45 MINUTES: CPT | Performed by: SOCIAL WORKER

## 2018-07-31 NOTE — TELEPHONE ENCOUNTER
Received notification via "notes routing," for prior authorization requests for sertraline 100mg and prazosin 1mg to be submitted to [employer]  Requests generated via [facility]  [employer] [facility]  I spoke with pharmacist at [facility]  Both medications processed via primary insurance [facility]) and no prior authorization needed

## 2018-07-31 NOTE — PSYCH
Psychotherapy Provided: Individual Psychotherapy 50 minutes     Length of time in session: 50 minutes, follow up in 2 week    Goals addressed in session: Goal 1, Goal 2 and Goal 3      Pain:      none    0    Current suicide risk : Low        D:  Met with Catalina ALATORRE; 'I'm good most of the time '  Discussed visit with best friend in [place]; had a lot of fun and spent time talking  Attended great cousins [place] where cousin was present  Stated she did well  Other family members available for support if needed  Denied SIB or ED behaviors though urges were present  Discussed feeling 'fat' triggered by date's she has been on where men are complementing her saying shes 'hot and thick'  Denied SI           A: Santiago Agosto presented with congruent mood and constricted affect  Santiago Agosto continues to date but has slowed down on high risk behaviors  'Trying to go out of her comfort zone' regarding type of men   Aware of certain types they are dangerous' for her  P: Continue individual therapy  Ongoing discussion of above topics  Behavioral Health Treatment Plan [facility]: Diagnosis and Treatment Plan explained to Torrie Guerin relates understanding diagnosis and is agreeable to Treatment Plan   Yes

## 2018-08-03 ENCOUNTER — TELEPHONE (OUTPATIENT)
Dept: NEUROLOGY | Facility: CLINIC | Age: 28
End: 2018-08-03

## 2018-08-03 ENCOUNTER — OFFICE VISIT (OUTPATIENT)
Dept: PAIN MEDICINE | Facility: CLINIC | Age: 28
End: 2018-08-03
Payer: OTHER MISCELLANEOUS

## 2018-08-03 VITALS
HEIGHT: 64 IN | HEART RATE: 80 BPM | BODY MASS INDEX: 29.37 KG/M2 | WEIGHT: 172 LBS | TEMPERATURE: 98.7 F | DIASTOLIC BLOOD PRESSURE: 78 MMHG | SYSTOLIC BLOOD PRESSURE: 112 MMHG | RESPIRATION RATE: 16 BRPM

## 2018-08-03 DIAGNOSIS — IMO0002 CHRONIC MIGRAINE: ICD-10-CM

## 2018-08-03 DIAGNOSIS — G89.29 CHRONIC RIGHT SHOULDER PAIN: Primary | ICD-10-CM

## 2018-08-03 DIAGNOSIS — M25.511 CHRONIC RIGHT SHOULDER PAIN: Primary | ICD-10-CM

## 2018-08-03 DIAGNOSIS — M77.8 SHOULDER TENDINITIS, RIGHT: ICD-10-CM

## 2018-08-03 DIAGNOSIS — S09.90XS TRAUMATIC INJURY OF HEAD, SEQUELA: ICD-10-CM

## 2018-08-03 PROCEDURE — 99212 OFFICE O/P EST SF 10 MIN: CPT | Performed by: NURSE PRACTITIONER

## 2018-08-03 RX ORDER — TOPIRAMATE 50 MG/1
150 CAPSULE, EXTENDED RELEASE ORAL
Qty: 21 CAPSULE | Refills: 0 | Status: SHIPPED | OUTPATIENT
Start: 2018-08-03 | End: 2018-09-18

## 2018-08-03 RX ORDER — TOPIRAMATE 50 MG/1
100 CAPSULE, EXTENDED RELEASE ORAL
Qty: 60 CAPSULE | Refills: 2 | Status: SHIPPED | OUTPATIENT
Start: 2018-08-03 | End: 2018-09-18

## 2018-08-03 NOTE — PROGRESS NOTES
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  The patient continues with ongoing right shoulder pain despite undergoing a right subacromial bursa injection,which only provided 3 hours of relief  Therefore, at this time as discussed last office visit if the patient did not received relief of symptoms after this her injection, we would refer her to Dr Derrick Hou to see if she would be a candidate for PRP injections  Therefore,she was given a referral to Orthopedic for evaluation for PRP injections  The patient reports that she is taking Lyrica 150 mg 1 tablet 2 times daily  Prescribed by her family doctor, which provides her mild to moderate relief of symptoms  The patient will follow up with our office on as needed basis or sooner with the worsening of symptoms  My impressions and treatment recommendations were discussed in detail with the patient who verbalized understanding and had no further questions  Discharge instructions were provided  I personally saw and examined the patient and I agree with the above discussed plan of care  Orders Placed This Encounter   Procedures    Ambulatory referral to Orthopedic Surgery     Standing Status:   Future     Standing Expiration Date:   2/3/2019     Referral Priority:   Routine     Referral Type:   Consult - AMB     Referral Reason:   Specialty Services Required     Referred to Provider:   Lindsay Clark MD     Requested Specialty:   Orthopedic Surgery     Number of Visits Requested:   1     Expiration Date:   8/3/2019     No orders of the defined types were placed in this encounter        History of Present Illness:  Vishnu Dubose is a 33 y o  female with a history of chronic pain secondary to right shoulder tendinitis and 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 work 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 in 2016  The patient was last seen in the office on 07/20/2018 when she underwent a right subacromial bursa injection that only provided her 3 hours of relief  She presents for a follow up office visit in regards to Shoulder Pain (radiates down the right arm); Arm Pain; and Neck Pain (stiffness in neck)  The patients current symptoms include neck stiffness that radiates into her right shoulder and right arm  She denies bilateral arm weakness  She describes her pain as burning, sharp, throbbing, shooting, numbness, pins and needles pain that is constant nature occurring throughout the day  The patient reports that her pain is symptoms have worsened since last visit  The patient is being treated with Lyrica 150 mg 2 times daily as prescribed by her family doctor for her fibromyalgia and rheumatoid arthritis  She reports that this medication provides her middle mild to moderate relief of symptoms without side effects  I have personally reviewed and/or updated the patient's past medical history, past surgical history, family history, social history, current medications, allergies, and vital signs today  Review of Systems   Respiratory: Negative for shortness of breath  Cardiovascular: Negative for chest pain  Gastrointestinal: Negative for constipation, diarrhea, nausea and vomiting  Musculoskeletal: Negative for arthralgias, gait problem, joint swelling and myalgias  Decreased ROM   Skin: Negative for rash  Neurological: Negative for dizziness, seizures and weakness  All other systems reviewed and are negative        Patient Active Problem List   Diagnosis    Neuropathic pain    Right shoulder pain    Moderate episode of recurrent major depressive disorder (HCC)    Eating disorder    Self-injurious behavior    PTSD (post-traumatic stress disorder)    SHIRLEY (generalized anxiety disorder)    Abdominal pain    Acute bacterial conjunctivitis of both eyes    Ankylosing spondylitis (HCC)    Cervical radiculopathy    Chronic migraine    Concussion    Extensor tendinitis of foot    Fatigue    Fibromyalgia    Herpes simplex infection    Microscopic hematuria    Myofascial pain    Numbness and tingling    Rheumatoid arthritis of multiple sites with negative rheumatoid factor ([facility] )    Right ovarian cyst    HORTENCIA (stress urinary incontinence, female)    Vitamin D deficiency    Head injury due to trauma    Sprain of left wrist    Closed nondisplaced fracture of neck of left radius       Past Medical History:   Diagnosis Date    [facility]     Depression     Fibromyalgia        Past Surgical History:   Procedure Laterality Date    MANDIBLE SURGERY      REDUCTION MAMMAPLASTY         Family History   Problem Relation Age of Onset    Osteoporosis Mother    [facility] Fibromyalgia Mother    [facility] Rheum arthritis Mother     Ulcerative colitis Mother     Cancer Father         adenocarcinoma    Hypertension Family     Ulcers Family         peptic    Kidney disease Family        Social History     Occupational History    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     Social History Main Topics    Smoking status: Never Smoker    Smokeless tobacco: Never Used    Alcohol use No      Comment: Alcohol use 1- drinks every week per [employer]    Drug use: No    Sexual activity: Not on file      Comment: history of birth control       Current Outpatient Prescriptions on File Prior to Visit   Medication Sig    ergocalciferol (VITAMIN D2) 50,000 units Take 50,000 Units by mouth once a week    lamoTRIgine (LaMICtal) 25 mg tablet One tab q h s  x1 week, then 2 tabs q h s  x1 week, then 3 tabs q h s  x1 week, then 4 tabs q h s  Call with rash      levonorgestrel (MIRENA) 20 MCG/24HR IUD 1 each by Intrauterine route once    lidocaine (LIDODERM) 5 % Place 3 patches on the skin daily Remove & Discard patch within 12 hours or as directed by Md  Can use up to 3 patches daily    prazosin (MINIPRESS) 1 mg capsule Take 3 capsules (3 mg total) by mouth daily at bedtime    rizatriptan (MAXALT) 10 MG tablet May repeat in 2 hours if needed    sertraline (ZOLOFT) 100 mg tablet Take 2 tablets (200 mg total) by mouth daily    Tofacitinib Citrate (XELJANZ XR PO) Take by mouth    valACYclovir (VALTREX) 500 mg tablet Take 500 mg by mouth daily as needed    pregabalin (LYRICA) 150 mg capsule Take 1 capsule (150 mg total) by mouth 2 (two) times a day for 7 days     No current facility-administered medications on file prior to visit  No Known Allergies    Physical Exam:    /78   Pulse 80   Temp 98 7 °F (37 1 °C)   Resp 16   Ht 5' 4" (1 626 m)   Wt 78 kg (172 lb)   BMI 29 52 kg/m²     Constitutional:normal, well developed, well nourished, alert, in no distress and non-toxic and no overt pain behavior    Eyes:anicteric  HEENT:grossly intact  Neck:supple, symmetric, trachea midline and no masses   Pulmonary:even and unlabored  Cardiovascular:No edema or pitting edema present  Skin:Normal without rashes or lesions and well hydrated  Psychiatric:Mood and affect appropriate  Neurologic:Cranial Nerves II-XII grossly intact  Musculoskeletal:normal    Cervical Spine Exam    Appearance:  Normal lordosis  Palpation/Tenderness:  right cervical paraspinal tenderness  right trapezium tenderness  Sensory:  no sensory deficits noted  Range of Motion:  Flexion:  No limitation  with pain  Extension:  No limitation  without pain  Lateral Flexion - Left:  No limitation  with pain  Lateral Flexion - Right:  No limitation  without pain  Rotation - Left:  No limitation  with pain  Rotation - Right:  No limitation  without pain  Motor Strength:  Left Arm Flexion  5/5  Left Arm Extension  5/5  Right Arm Flexion  5/5  Right Arm Extension  5/5  Left Wrist Flexion  5/5  Left Wrist Extension 5/5  Left Finger Abduction  5/5  Right Finger Abduction  5/5  Left    5/5  Right   5/5  Special Tests:  Left Spurlings:  negative  Right Spurlings  negative     Right shoulder exam:  Full range of motion  Pain with palpation of right shoulder joint  Imaging    MRI RIGHT SHOULDER     INDICATION:  S49  91XA: Unspecified injury of right shoulder and upper arm, initial encounter  History taken directly from the electronic ordering system  Shoulder injury and pain     COMPARISON:  None      TECHNIQUE:   The following MR sequences were obtained of the right shoulder: Localizer, axial GRE/PD fat sat, oblique coronal T2 fat sat, oblique sagittal T1/T2 fat sat  Images were acquired on a 1 5 Annalise unit  Gadolinium was not used      FINDINGS:     SUBCUTANEOUS TISSUES: Normal     JOINT EFFUSION: None      ACROMION PROCESS: Normal      ROTATOR CUFF: Supraspinatus and infraspinatus insertional tendinosis with an anterior leading edge undersurface supraspinatus tendon tear measuring up to 10 mm in transverse dimension with tiny full-thickness component anteriorly  No significant tendon   retraction or fatty muscular infiltration      SUBACROMIAL/SUBDELTOID BURSA: Minimal bursal fluid evident       LONG HEAD OF BICEPS TENDON: There is mild tendinosis without tear      GLENOID LABRUM: Intact      GLENOHUMERAL JOINT: Intact      ACROMIOCLAVICULAR JOINT:  Normal      BONE MARROW SIGNAL: Normal      IMPRESSION:  1  Supraspinatus and infraspinous insertional tendinosis with a small undersurface supraspinatus tendon tear anteriorly with tiny full-thickness component  No tendon retraction or fatty muscular infiltration  2   Mild proximal biceps tendinosis without tear    3   Intact glenoid labrum         Workstation performed: [id number]

## 2018-08-03 NOTE — TELEPHONE ENCOUNTER
She should wean trokendi once starting lamictal  I would like her to wean down to just 100 mg trokendi for now, until lamictal effective, then wean the rest of the way off trokendi  Will send refill of trokendi for weaning

## 2018-08-03 NOTE — TELEPHONE ENCOUNTER
Patient states she needs a PA for her lamotrigine  States it should be going through [facility]  Spoke with [facility], this should be going through [facility], they will take care of PA  Patient states she thought she was supposed to be weaning off of her Trokendi  Nothing found in the last office note  Patient has not started the lamotrigine yet  Please advise

## 2018-08-07 NOTE — TELEPHONE ENCOUNTER
Made patient aware  Patient will follow up with pharmacy regarding PA  Has not started Lamictal yet  Provided instructions below    Pt verbalizes clear understanding of weaning instructions once she has started Lamictal

## 2018-08-13 ENCOUNTER — OFFICE VISIT (OUTPATIENT)
Dept: BEHAVIORAL/MENTAL HEALTH CLINIC | Facility: CLINIC | Age: 28
End: 2018-08-13
Payer: COMMERCIAL

## 2018-08-13 ENCOUNTER — TELEPHONE (OUTPATIENT)
Dept: INTERNAL MEDICINE CLINIC | Facility: CLINIC | Age: 28
End: 2018-08-13

## 2018-08-13 DIAGNOSIS — F50.9 EATING DISORDER: Primary | ICD-10-CM

## 2018-08-13 DIAGNOSIS — F41.1 GAD (GENERALIZED ANXIETY DISORDER): ICD-10-CM

## 2018-08-13 DIAGNOSIS — Z72.89 SELF-INJURIOUS BEHAVIOR: ICD-10-CM

## 2018-08-13 DIAGNOSIS — F33.1 MODERATE EPISODE OF RECURRENT MAJOR DEPRESSIVE DISORDER (HCC): ICD-10-CM

## 2018-08-13 DIAGNOSIS — F43.10 PTSD (POST-TRAUMATIC STRESS DISORDER): ICD-10-CM

## 2018-08-13 DIAGNOSIS — R19.7 DIARRHEA, UNSPECIFIED TYPE: Primary | ICD-10-CM

## 2018-08-13 PROCEDURE — 90834 PSYTX W PT 45 MINUTES: CPT | Performed by: SOCIAL WORKER

## 2018-08-13 NOTE — PSYCH
Psychotherapy Provided: Individual Psychotherapy 50 minutes     Length of time in session: 50 minutes, follow up in 2 week    Goals addressed in session: Goal 1, Goal 2 and Goal 3      Pain:      none    0    Current suicide risk : Low     D:  Met with Catalina brizuela  ROS; 'I'm up and down ' States she has been experiencing mood swings over past few weeks  Refuses to answer calls or texts  Family became concerned  Experiencing depressive symptoms  "I'm just really sad"  Acknowledged purging x3 of past 2 weeks  Dated once though 'got pissed really quickly at them'  Also left a graduation party early because she was feeling sick  "I always feel sick or think I am sick lately'  States all meals trigger excessive diarrhea  Further processed brought up self sabotage  Denied SI           A: Erick Branch presented with mood and constricted affect  Very tearful and negative towards self  Feels lonely and continues to grieve for father though has also acknowledged not feeling worthy therefore self sabotages  Began to bite self towards end of session but quickly refocused and completed mindfulness exercise with therapist      P: Continue individual therapy  Ongoing discussion of above topics  Behavioral Health Treatment Plan [facility]: Diagnosis and Treatment Plan explained to Micheal Moncada relates understanding diagnosis and is agreeable to Treatment Plan   Yes

## 2018-08-14 ENCOUNTER — OFFICE VISIT (OUTPATIENT)
Dept: GASTROENTEROLOGY | Facility: AMBULARY SURGERY CENTER | Age: 28
End: 2018-08-14
Payer: COMMERCIAL

## 2018-08-14 VITALS
HEART RATE: 75 BPM | SYSTOLIC BLOOD PRESSURE: 122 MMHG | TEMPERATURE: 98.8 F | BODY MASS INDEX: 28.32 KG/M2 | DIASTOLIC BLOOD PRESSURE: 76 MMHG | WEIGHT: 165 LBS

## 2018-08-14 DIAGNOSIS — R10.84 GENERALIZED ABDOMINAL PAIN: Primary | ICD-10-CM

## 2018-08-14 DIAGNOSIS — R19.7 DIARRHEA, UNSPECIFIED TYPE: ICD-10-CM

## 2018-08-14 DIAGNOSIS — M79.2 NEUROPATHIC PAIN: ICD-10-CM

## 2018-08-14 PROCEDURE — 99244 OFF/OP CNSLTJ NEW/EST MOD 40: CPT | Performed by: INTERNAL MEDICINE

## 2018-08-14 RX ORDER — DICYCLOMINE HYDROCHLORIDE 10 MG/1
10 CAPSULE ORAL 3 TIMES DAILY PRN
Qty: 90 CAPSULE | Refills: 3 | Status: SHIPPED | OUTPATIENT
Start: 2018-08-14 | End: 2018-12-26 | Stop reason: SDUPTHER

## 2018-08-14 RX ORDER — LIDOCAINE 50 MG/G
2 PATCH TOPICAL DAILY
Qty: 60 PATCH | Refills: 5 | Status: SHIPPED | OUTPATIENT
Start: 2018-08-14 | End: 2019-07-22 | Stop reason: HOSPADM

## 2018-08-14 NOTE — PATIENT INSTRUCTIONS
Colonoscopy scheduled with Dr Anna Dsouza 10/8/18 at [address] [place] [address] instructions given in office

## 2018-08-14 NOTE — TELEPHONE ENCOUNTER
Please call Ke Luna to see if she is using the patches and where she is placing them     If she needs a refill , please send back to me

## 2018-08-14 NOTE — PROGRESS NOTES
Consultation - [facility]  Js Rust 33 y o  female MRN: [id number]  Unit/Bed#:  Encounter: [id number]        Consults    ASSESSMENT/PLAN:   1  Abdominal bloating/diarrhea/cramping pain-has history of rheumatoid arthritis and vitamin-D deficiency, therefore concerning for inflammatory bowel disease, other possibilities include infectious colitis versus IBS or small intestinal bacterial overgrowth  -will check celiac serologies  -will hold off on checking inflammatory markers as patient does have history of rheumatoid arthritis as well   -will check stool for ova and parasites, cultures and C diff   -will plan for colonoscopy with ileal intubation for evaluation of inflammatory bowel disease   -if celiac serologies are positive, will consider adding EGD otherwise hold off on upper endoscopic evaluation  -recommend low FODMAP diet   -encouraged to continue probiotics on daily basis and Bentyl 10 mg t i d  on as-needed basis only  -check basic labs including CBC, CMP and TSH  2  Patient was explained about  the risks and benefits of the procedure  Risks including but not limited to bleeding, infection, perforation were explained in detail  Also explained about less than 100% sensitivity with the exam and other alternatives  ______________________________________________________________________    Reason for Consult / Principal Problem: [unfilled]    HPI: Js Rust is a 33y o  year old female with history of rheumatoid arthritis, on [place] [place], presents for evaluation of change in bowel habits over the past 1 and half year along with abdominal cramping and bloating  Patient states that she has been having loose stools for the past 1 and half years, states that symptoms are more often postprandial, sometimes containing mucus but nonbloody  She denies nocturnal symptoms  She is unable to identify any food triggers    She also endorses borborygmi me and rare symptoms of heartburn  She denies nausea or vomiting  Denies unintentional weight loss  She does have family history of ulcerative colitis in her mother  She states that she had normal bowel movements prior to this  Denies any change in medications, has been on Lyrica for at least 5 years  No recent travel, antibiotic use or sick contacts  Review of Systems: The remainder of the review of systems was negative except for the pertinent positives noted in HPI  Historical Information   Past Medical History:   Diagnosis Date    Ankylosing spondylitis ([address] )     Depression     Fibromyalgia      Past Surgical History:   Procedure Laterality Date    MANDIBLE SURGERY      REDUCTION MAMMAPLASTY       Social History   History   Alcohol Use No     Comment: Alcohol use 1- drinks every week per [employer]     History   Drug Use No     History   Smoking Status    Never Smoker   Smokeless Tobacco    Never Used     Family History   Problem Relation Age of Onset    Osteoporosis Mother    Elwyn Serge Fibromyalgia Mother     Rheum arthritis Mother     Ulcerative colitis Mother     Cancer Father         adenocarcinoma    Hypertension Family     Ulcers Family         peptic    Kidney disease Family        Meds/Allergies       (Not in a hospital admission)  No current facility-administered medications for this visit  No Known Allergies    Objective     Blood pressure 122/76, pulse 75, temperature 98 8 °F (37 1 °C), temperature source Tympanic, weight 74 8 kg (165 lb), not currently breastfeeding  [unfilled]    PHYSICAL EXAM     GEN: well nourished, well developed, no acute distress  HEENT: anicteric, MMM, no cervical or supraclavicular lymphadenopathy  CV: RRR, no m/r/g  CHEST: CTA b/l, no WRR  ABD: +BS, soft, NT/ND, no hepatosplenomegaly  EXT: no c/c/e  SKIN: no rashes,  NEURO: aaox3    Lab Results:   No visits with results within 1 Day(s) from this visit     Latest known visit with results is:   Office Visit on 06/12/2018   Component Date Value    N gonorrhoeae, DNA Probe 06/12/2018 N  gonorrhoeae Amplified DNA Negative     Chlamydia, DNA Probe 06/12/2018 C  trachomatis Amplified DNA Negative      Imaging Studies: I have personally reviewed pertinent films in PACS

## 2018-08-14 NOTE — LETTER
August 14, 2018     Rimma Espinosa, [address] [place] [address]    Patient: Alicia Simmons   YOB: 1990   Date of Visit: 8/14/2018       Dear Dr Nadia Gates:    Thank you for referring Alicia Simmons to me for evaluation  Below are my notes for this consultation  If you have questions, please do not hesitate to call me  I look forward to following your patient along with you  Sincerely,        Michel Anna MD        CC: No Recipients  Michel Anna MD  8/14/2018  2:01 PM  Sign at close encounter  Consultation - [address] [facility]  Alicia Simmons 33 y o  female MRN: [id number]  Unit/Bed#:  Encounter: [id number]        Consults    ASSESSMENT/PLAN:   1  Abdominal bloating/diarrhea/cramping pain-has history of rheumatoid arthritis and vitamin-D deficiency, therefore concerning for inflammatory bowel disease, other possibilities include infectious colitis versus IBS or small intestinal bacterial overgrowth  -will check celiac serologies  -will hold off on checking inflammatory markers as patient does have history of rheumatoid arthritis as well   -will check stool for ova and parasites, cultures and C diff   -will plan for colonoscopy with ileal intubation for evaluation of inflammatory bowel disease   -if celiac serologies are positive, will consider adding EGD otherwise hold off on upper endoscopic evaluation  -recommend low FODMAP diet   -encouraged to continue probiotics on daily basis and Bentyl 10 mg t i d  on as-needed basis only  -check basic labs including CBC, CMP and TSH  2  Patient was explained about  the risks and benefits of the procedure  Risks including but not limited to bleeding, infection, perforation were explained in detail  Also explained about less than 100% sensitivity with the exam and other alternatives                  ______________________________________________________________________    Reason for Consult / Principal Problem: [unfilled]    HPI: Ofelia Asif is a 33y o  year old female with history of rheumatoid arthritis, on [place], presents for evaluation of change in bowel habits over the past 1 and half year along with abdominal cramping and bloating  Patient states that she has been having loose stools for the past 1 and half years, states that symptoms are more often postprandial, sometimes containing mucus but nonbloody  She denies nocturnal symptoms  She is unable to identify any food triggers  She also endorses borborygmi me and rare symptoms of heartburn  She denies nausea or vomiting  Denies unintentional weight loss  She does have family history of ulcerative colitis in her mother  She states that she had normal bowel movements prior to this  Denies any change in medications, has been on Lyrica for at least 5 years  No recent travel, antibiotic use or sick contacts  Review of Systems: The remainder of the review of systems was negative except for the pertinent positives noted in HPI  Historical Information   Past Medical History:   Diagnosis Date    [address] [address]     Depression     Fibromyalgia      Past Surgical History:   Procedure Laterality Date    MANDIBLE SURGERY      REDUCTION MAMMAPLASTY       Social History   History   Alcohol Use No     Comment: Alcohol use 1- drinks every week per [employer]     History   Drug Use No     History   Smoking Status    Never Smoker   Smokeless Tobacco    Never Used     Family History   Problem Relation Age of Onset    Osteoporosis Mother    Memo Hero Fibromyalgia Mother     Rheum arthritis Mother     Ulcerative colitis Mother     Cancer Father         adenocarcinoma    Hypertension Family     Ulcers Family         peptic    Kidney disease Family        Meds/Allergies       (Not in a hospital admission)  No current facility-administered medications for this visit          No Known Allergies    Objective     Blood pressure 122/76, pulse 75, temperature 98 8 °F (37 1 °C), temperature source Tympanic, weight 74 8 kg (165 lb), not currently breastfeeding  [unfilled]    PHYSICAL EXAM     GEN: well nourished, well developed, no acute distress  HEENT: anicteric, MMM, no cervical or supraclavicular lymphadenopathy  CV: RRR, no m/r/g  CHEST: CTA b/l, no WRR  ABD: +BS, soft, NT/ND, no hepatosplenomegaly  EXT: no c/c/e  SKIN: no rashes,  NEURO: aaox3    Lab Results:   No visits with results within 1 Day(s) from this visit     Latest known visit with results is:   Office Visit on 06/12/2018   Component Date Value    N gonorrhoeae, DNA Probe 06/12/2018 N  gonorrhoeae Amplified DNA Negative     Chlamydia, DNA Probe 06/12/2018 C  trachomatis Amplified DNA Negative      Imaging Studies: I have personally reviewed pertinent films in PACS

## 2018-08-17 ENCOUNTER — OFFICE VISIT (OUTPATIENT)
Dept: OBGYN CLINIC | Facility: CLINIC | Age: 28
End: 2018-08-17
Payer: OTHER MISCELLANEOUS

## 2018-08-17 VITALS
HEART RATE: 80 BPM | HEIGHT: 64 IN | BODY MASS INDEX: 29.19 KG/M2 | WEIGHT: 171 LBS | SYSTOLIC BLOOD PRESSURE: 109 MMHG | DIASTOLIC BLOOD PRESSURE: 73 MMHG

## 2018-08-17 DIAGNOSIS — G89.29 CHRONIC RIGHT SHOULDER PAIN: ICD-10-CM

## 2018-08-17 DIAGNOSIS — M25.511 CHRONIC RIGHT SHOULDER PAIN: ICD-10-CM

## 2018-08-17 DIAGNOSIS — M25.511 CHRONIC PERISCAPULAR PAIN ON RIGHT SIDE: Primary | ICD-10-CM

## 2018-08-17 DIAGNOSIS — G89.29 CHRONIC PERISCAPULAR PAIN ON RIGHT SIDE: Primary | ICD-10-CM

## 2018-08-17 DIAGNOSIS — M75.111 INCOMPLETE TEAR OF RIGHT ROTATOR CUFF: ICD-10-CM

## 2018-08-17 PROBLEM — S63.502A SPRAIN OF LEFT WRIST: Status: RESOLVED | Noted: 2018-04-10 | Resolved: 2018-08-17

## 2018-08-17 PROCEDURE — 99214 OFFICE O/P EST MOD 30 MIN: CPT | Performed by: ORTHOPAEDIC SURGERY

## 2018-08-17 NOTE — PROGRESS NOTES
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  1  Patient has exclusively inferomedial periscapular pain at this time secondary to the dorsal scapular nerve injury  She has no shoulder pain otherwise, and her exam is not suggestive of persistent symptomatic rotator cuff pathology  2  Continue activity modification, home exercises for stretching and strengthening of the rotator cuff and scapular stabilizers, and OTC anti-inflammatory medication as needed  3  No surgical indications at this time in my opinion  Second opinion with an orthopedic shoulder subspecialist was discussed based on her limited treatment alternatives and persistent pain in spite of extensive conservative management  Chief Complaint    Right shoulder pain      History of the Present Illness    33year-old with right shoulder pain after a work injury in 2016  She has had persistent pain localizing to the inferomedial periscapular region that is exacerbated by driving or increased use of her arm  She has a small area of numbness in the inferomedial periscapular region as well  She previously had shoulder pain that has resolved at this point  No numbness or tingling in the right upper extremity otherwise  She was treated with extensive physical therapy, medication, activity restrictions, and steroid injections with limited relief of the periscapular pain but resolution of her right shoulder pain        Physical Exam    /73 (BP Location: Left arm, Patient Position: Sitting, Cuff Size: Adult)   Pulse 80   Ht 5' 4" (1 626 m)   Wt 77 6 kg (171 lb)   BMI 29 35 kg/m²     Right shoulder:  · No scapular winging or other gross deformity  · Tenderness to palpation inferomedial periscapular muscles  · Nontender to palpation over the right shoulder  · Passive range of motion is full  · Neer impingement sign is negative  · Saravia impingement sign is negative  · Empty can test is negative  · Speed's test is negative  · Cross body adduction test is negative  · 5/5 strength forward flexion    Constitutional:  Well-developed and well-nourished  Eyes:  Anicteric sclerae  Neck:  Supple  Lungs:  Unlabored breathing  Cardiovascular:  2+ radial pulse on the right  Skin:  Intact without erythema  Neurologic:  Sensation decreased to light touch over the inferomedial periscapular region  Psychiatric:  Mood and affect are appropriate  Data Review    I have personally reviewed pertinent films in PACS, and my interpretation follows  MRI cervical spine 8/9/17:  Normal study  MRI right shoulder 1/15/18:  Low-grade partial-thickness articular sided tear of the supraspinatus tendon with questionable tiny full-thickness component anteriorly  Mild proximal biceps tendinosis  EMG bilateral upper extremity 8/17/17:  Slight decreased motor recruitment of the rhomboid muscles consistent with dorsal scapular neuropathy  Past Medical History:   Diagnosis Date    [address] [facility] [address]     Depression     Fibromyalgia        Past Surgical History:   Procedure Laterality Date    MANDIBLE SURGERY      REDUCTION MAMMAPLASTY         No Known Allergies    Current Outpatient Prescriptions on File Prior to Visit   Medication Sig Dispense Refill    dicyclomine (BENTYL) 10 mg capsule Take 1 capsule (10 mg total) by mouth 3 (three) times a day as needed (abdominal pain and diarrhea) 90 capsule 3    ergocalciferol (VITAMIN D2) 50,000 units Take 50,000 Units by mouth once a week      lamoTRIgine (LaMICtal) 25 mg tablet One tab q h s  x1 week, then 2 tabs q h s  x1 week, then 3 tabs q h s  x1 week, then 4 tabs q h s  Call with rash   120 tablet 2    levonorgestrel (MIRENA) 20 MCG/24HR IUD 1 each by Intrauterine route once      lidocaine (LIDODERM) 5 % Place 2 patches on the skin daily Remove after 12 hours 60 patch 5    prazosin (MINIPRESS) 1 mg capsule Take 3 capsules (3 mg total) by mouth daily at bedtime 90 capsule 3    rizatriptan (MAXALT) 10 MG tablet May repeat in 2 hours if needed 9 tablet 3    sertraline (ZOLOFT) 100 mg tablet Take 2 tablets (200 mg total) by mouth daily 60 tablet 3    Tofacitinib Citrate (XELJANZ XR PO) Take by mouth      TROKENDI XR 50 MG CP24 Take 3 capsules (150 mg total) by mouth daily at bedtime X 1 week, then 100 mg rx  21 capsule 0    TROKENDI XR 50 MG CP24 Take 2 capsules (100 mg total) by mouth daily at bedtime 60 capsule 2    valACYclovir (VALTREX) 500 mg tablet Take 500 mg by mouth daily as needed      Na Sulfate-K Sulfate-Mg Sulf 17 5-3 13-1 6 FT/639BI SOLN Take 1 applicator by mouth once for 1 dose 1 Bottle 0    pregabalin (LYRICA) 150 mg capsule Take 1 capsule (150 mg total) by mouth 2 (two) times a day for 7 days 14 capsule 0     No current facility-administered medications on file prior to visit  Social History   Substance Use Topics    Smoking status: Never Smoker    Smokeless tobacco: Never Used    Alcohol use No      Comment: Alcohol use 1- drinks every week per [employer]       Family History   Problem Relation Age of Onset    Osteoporosis Mother     Fibromyalgia Mother     Rheum arthritis Mother     Ulcerative colitis Mother     Cancer Father         adenocarcinoma    Hypertension Family     Ulcers Family         peptic    Kidney disease Family          Review of Systems    General:  Negative for fever, lethargy/malaise, or night sweats  Eyes:  Negative for blurry vision or double vision  ENT:  Negative for hearing change, nasal discharge, or sore throat  Hematological:  Negative for bleeding problems or blood clots  Endocrine:  Negative for excessive thirst or temperature intolerance  Respiratory:  Negative for cough or wheezing  Cardiovascular:  Negative for chest pain, dyspnea on exertion, or palpitations    Gastrointestinal:  Negative for abdominal pain, diarrhea, or nausea/vomiting  Musculoskeletal:  As stated in the HPI and otherwise negative  Neurological:  Negative for confusion, headaches, or seizures  Psychological:  Negative for hallucinations or mood swings  Dermatological:  Negative for itching or rash

## 2018-08-22 ENCOUNTER — TELEPHONE (OUTPATIENT)
Dept: NEUROLOGY | Facility: CLINIC | Age: 28
End: 2018-08-22

## 2018-08-22 NOTE — TELEPHONE ENCOUNTER
Patient called about this letter that she needs sent to [facility]  Fax to [phone]  Attn: Nasreen Linares (claim adjustor)  Patient is requesting a call once it is completed  She states she was told about 2 weeks ago (by someone at [place] office) that this would be completed the same day

## 2018-08-22 NOTE — TELEPHONE ENCOUNTER
----- Message from Padma Beal sent at 8/22/2018  8:23 AM EDT -----  Regarding: FW: Non-Urgent Medical Question  Contact: [phone]      ----- Message -----  From: Merry Fernández  Sent: 8/21/2018   4:11 PM  To: [facility] [place] [facility]  Subject: Non-Urgent Medical Question                      Hello,    I want to confirm that my medical need for the medication change to Lymictal ( not sure of spelling ) was faxed over to my workers Comp   Also noting that I am coming off of the [email or web address]  The pharmacy told me they are still waiting for approval  Please confirm for me

## 2018-08-23 ENCOUNTER — TELEPHONE (OUTPATIENT)
Dept: PAIN MEDICINE | Facility: CLINIC | Age: 28
End: 2018-08-23

## 2018-08-23 NOTE — TELEPHONE ENCOUNTER
S/W patient, states she would like a copy of the letter mailed to her home  Verified home address and sent out today

## 2018-08-23 NOTE — TELEPHONE ENCOUNTER
Attempted to reach patient  LVYEN with c/b #, [place] provided  Please see task regarding letter and follow up in 3 months with our office  ----- Message from Berry Rubio sent at 8/23/2018 10:52 AM EDT -----  Regarding: FW: Visit Follow-Up Question  Contact: [phone]  A letter was generated for restrictions to perform TACT 2 physical restraints, due to her shoulder issues  The patient will have to follow up in 3 months with our office  I sent the letter via my chart to the patient  However, we can mail her the letter to her as well        ----- Message -----  From: Daina Alonzo RN  Sent: 8/21/2018   1:52 PM  To: ORAL Rubio  Subject: FW: Visit Follow-Up Question                         ----- Message -----  From: Nam Manzo  Sent: 8/21/2018  12:38 PM  To: [facility]  Subject: Visit Follow-Up Question                         Hello,    I need a medical note excusing me from doing restraints from work  Please be sure to include specific dates of beginning and ending  Specific words to  include "will not do TACT 2 physical restraints due to"     my injury   Thank you!!

## 2018-09-11 ENCOUNTER — APPOINTMENT (OUTPATIENT)
Dept: LAB | Facility: CLINIC | Age: 28
End: 2018-09-11
Payer: COMMERCIAL

## 2018-09-11 DIAGNOSIS — R19.7 DIARRHEA, UNSPECIFIED TYPE: ICD-10-CM

## 2018-09-11 PROCEDURE — 87209 SMEAR COMPLEX STAIN: CPT

## 2018-09-11 PROCEDURE — 87177 OVA AND PARASITES SMEARS: CPT

## 2018-09-12 ENCOUNTER — TELEPHONE (OUTPATIENT)
Dept: NEUROLOGY | Facility: CLINIC | Age: 28
End: 2018-09-12

## 2018-09-12 NOTE — TELEPHONE ENCOUNTER
Pt requesting to come in for toradol inj today  States she was head-butted approx 1 hr ago and took excedrin as she does not have rizatriptan on hand  No relief  I advised her you were in with last pt per FRANNY Roblero  Pt will try imitrex once she gets home  Pt to call us in am w/ update      Pt [phone]

## 2018-09-13 ENCOUNTER — CLINICAL SUPPORT (OUTPATIENT)
Dept: NEUROLOGY | Facility: CLINIC | Age: 28
End: 2018-09-13
Payer: COMMERCIAL

## 2018-09-13 DIAGNOSIS — G43.709 CHRONIC MIGRAINE WITHOUT AURA WITHOUT STATUS MIGRAINOSUS, NOT INTRACTABLE: Primary | ICD-10-CM

## 2018-09-13 LAB — O+P STL CONC: NORMAL

## 2018-09-13 PROCEDURE — 96372 THER/PROPH/DIAG INJ SC/IM: CPT | Performed by: PHYSICIAN ASSISTANT

## 2018-09-13 RX ORDER — KETOROLAC TROMETHAMINE 30 MG/ML
60 INJECTION, SOLUTION INTRAMUSCULAR; INTRAVENOUS ONCE
Status: COMPLETED | OUTPATIENT
Start: 2018-09-13 | End: 2018-09-13

## 2018-09-13 RX ADMIN — KETOROLAC TROMETHAMINE 60 MG: 30 INJECTION, SOLUTION INTRAMUSCULAR; INTRAVENOUS at 15:43

## 2018-09-13 NOTE — TELEPHONE ENCOUNTER
Pt did try maxalt last night w/no real improvement    Pt agreeable to coming in this afternoon, I did speak w/Lyly  Pt is ok to come in by 4pm @the latest, pt is aware  I did place pt on [address] schedule today   Lyly aware

## 2018-09-17 ENCOUNTER — TELEPHONE (OUTPATIENT)
Dept: NEUROLOGY | Facility: CLINIC | Age: 28
End: 2018-09-17

## 2018-09-17 DIAGNOSIS — F07.81 POSTCONCUSSION SYNDROME: Primary | ICD-10-CM

## 2018-09-17 NOTE — PROGRESS NOTES
Procedures    Patient received Toradol shot on 9/13/2018 on the left gluteus  Patient tolerated procedure

## 2018-09-17 NOTE — TELEPHONE ENCOUNTER
Patient is requesting a referral to:  [address]    She states they do therapy specifically for concussion and migraine  She is requesting referral be mailed to her home and also faxed to [facility]  Fax to [phone]  Attn: Carmine Licona (claim adjustor)    Patient requesting call back

## 2018-09-18 ENCOUNTER — ANNUAL EXAM (OUTPATIENT)
Dept: OBGYN CLINIC | Facility: CLINIC | Age: 28
End: 2018-09-18
Payer: COMMERCIAL

## 2018-09-18 VITALS
SYSTOLIC BLOOD PRESSURE: 102 MMHG | HEIGHT: 64 IN | DIASTOLIC BLOOD PRESSURE: 60 MMHG | WEIGHT: 171 LBS | BODY MASS INDEX: 29.19 KG/M2

## 2018-09-18 DIAGNOSIS — Z30.431 IUD CHECK UP: ICD-10-CM

## 2018-09-18 DIAGNOSIS — Z11.3 SCREENING FOR STDS (SEXUALLY TRANSMITTED DISEASES): ICD-10-CM

## 2018-09-18 DIAGNOSIS — Z01.419 WOMEN'S ANNUAL ROUTINE GYNECOLOGICAL EXAMINATION: Primary | ICD-10-CM

## 2018-09-18 PROCEDURE — S0612 ANNUAL GYNECOLOGICAL EXAMINA: HCPCS | Performed by: OBSTETRICS & GYNECOLOGY

## 2018-09-18 PROCEDURE — 87491 CHLMYD TRACH DNA AMP PROBE: CPT | Performed by: OBSTETRICS & GYNECOLOGY

## 2018-09-18 PROCEDURE — 87591 N.GONORRHOEAE DNA AMP PROB: CPT | Performed by: OBSTETRICS & GYNECOLOGY

## 2018-09-18 RX ORDER — TOFACITINIB 11 MG/1
TABLET, FILM COATED, EXTENDED RELEASE ORAL
COMMUNITY
Start: 2018-09-13 | End: 2019-02-28 | Stop reason: ALTCHOICE

## 2018-09-18 NOTE — TELEPHONE ENCOUNTER
Faxed and called the patient to let her know that I faxed it to [facility]   And mail it to her

## 2018-09-18 NOTE — PROGRESS NOTES
ASSESSMENT & PLAN: Velma Pruitt is a 33 y o  Devyncai Herder with normal gynecologic exam     1   Routine well woman exam done today  2    Pap and HPV:Pap with reflex HPV was not done today  Current ASCCP Guidelines reviewed  Last Pap 2016 :  no abnormalities  3   The patient agreeed to STD testing  chlamydia and gonorrhea testing performed  Safe sex practices have been discussed  4  The patient is sexually active  She agreeed to continue current contraception and options have been discussed  5  The following were reviewed in today's visit: breast self exam, family planning choices, exercise and healthy diet  6  Patient to return to office in 12 months for annual exam      All questions have been answered to her satisfaction  CC:  Annual Gynecologic Examination    HPI: Velma Pruitt is a 33 y o  Leóni Herder who presents for annual gynecologic examination  She has the following concerns:  Recurrent yeast infections  Taking acidophilus and it seems to be helping  Discussed if asymptomatic, ie no itch/irritation, treatment not necessary  Health Maintenance:    She exercises 0 days per week  She wears her seatbelt routinely  She does not perform regular monthly self breast exams  She feels safe at home  Patients does try to follow a balanced diet  Past Medical History:   Diagnosis Date    [facility] sp[facility]yl[facility] ([facility] [address] [facility]     Depression     Fibromyalgia        Past Surgical History:   Procedure Laterality Date    MANDIBLE SURGERY      REDUCTION MAMMAPLASTY         Past OB/Gyn History:   No LMP recorded  Patient has had an implant  Menstrual History:  OB History      Para Term  AB Living    0 0 0 0 0 0    SAB TAB Ectopic Multiple Live Births    0 0 0 0 0           No LMP recorded  Patient has had an implant  History of sexually transmitted infection No  Patient is currently sexually active  heterosexual Birth control: Mirena IUD since 2016    Last Pap  2016 :  no abnormalities  Family History   Problem Relation Age of Onset    Osteoporosis Mother    Yenifer Roles Fibromyalgia Mother     Rheum arthritis Mother     Ulcerative colitis Mother    Yenifer Roles Cancer Father         adenocarcinoma    Hypertension Family     Ulcers Family         peptic    Kidney disease Family        Social History:  Social History     Social History    Marital status: Single     Spouse name: N/A    Number of children: N/A    Years of education: Currently in school     Occupational History    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[address] [employer]     Social History Main Topics    Smoking status: Never Smoker    Smokeless tobacco: Never Used    Alcohol use No      Comment: Alcohol use 1- drinks every week per [employer]    Drug use: No    Sexual activity: Yes     Partners: Male     Birth control/ protection: IUD      Comment: history of birth control     Other Topics Concern    Not on file     Social History Narrative    Daily coffee consumption, 1 cup per day    Self injurious behavior     Presently lives with self  Patient is dating  Patient is currently employed  No Known Allergies    Current Outpatient Prescriptions:     dicyclomine (BENTYL) 10 mg capsule, Take 1 capsule (10 mg total) by mouth 3 (three) times a day as needed (abdominal pain and diarrhea), Disp: 90 capsule, Rfl: 3    ergocalciferol (VITAMIN D2) 50,000 units, Take 50,000 Units by mouth once a week, Disp: , Rfl:     lamoTRIgine (LaMICtal) 25 mg tablet, One tab q h s  x1 week, then 2 tabs q h s  x1 week, then 3 tabs q h s  x1 week, then 4 tabs q h s   Call with rash , Disp: 120 tablet, Rfl: 2    levonorgestrel (MIRENA) 20 MCG/24HR IUD, 1 each by Intrauterine route once, Disp: , Rfl:     lidocaine (LIDODERM) 5 %, Place 2 patches on the skin daily Remove after 12 hours, Disp: 60 patch, Rfl: 5    prazosin (MINIPRESS) 1 mg capsule, Take 3 capsules (3 mg total) by mouth daily at bedtime, Disp: 90 capsule, Rfl: 3    pregabalin (LYRICA) 150 mg capsule, Take 1 capsule (150 mg total) by mouth 2 (two) times a day for 7 days, Disp: 14 capsule, Rfl: 0    rizatriptan (MAXALT) 10 MG tablet, May repeat in 2 hours if needed, Disp: 9 tablet, Rfl: 3    sertraline (ZOLOFT) 100 mg tablet, Take 2 tablets (200 mg total) by mouth daily, Disp: 60 tablet, Rfl: 3    XELJANZ XR 11 MG TB24, , Disp: , Rfl:     Na Sulfate-K Sulfate-Mg Sulf 17 5-3 13-1 6 GM/180ML SOLN, Take 1 applicator by mouth once for 1 dose, Disp: 1 Bottle, Rfl: 0    Review of Systems:  Review of Systems   Constitutional: Negative for unexpected weight change  Respiratory: Negative for shortness of breath  Cardiovascular: Negative for chest pain  Gastrointestinal: Negative for abdominal pain, blood in stool, nausea and vomiting  Genitourinary: Negative for difficulty urinating, menstrual problem, pelvic pain, vaginal bleeding, vaginal discharge and vaginal pain  Neurological: Positive for headaches  Breasts: no changes    Physical Exam:  /60   Ht 5' 3 5" (1 613 m)   Wt 77 6 kg (171 lb)   BMI 29 82 kg/m²    Physical Exam   Constitutional: She appears well-developed and well-nourished  Genitourinary: Vagina normal and uterus normal  Pelvic exam was performed with patient supine  There is no rash, tenderness or lesion on the right labia  There is no rash, tenderness or lesion on the left labia  No vaginal discharge found  Right adnexum does not display mass, does not display tenderness and does not display fullness  Left adnexum does not display mass, does not display tenderness and does not display fullness  Cervix is nulliparous  Cervix exhibits visible IUD strings  Cervix does not exhibit motion tenderness, lesion, discharge, polyp or nabothian cyst      Uterus is mobile and anteverted  Uterus is not enlarged or tender  HENT:   Head: Normocephalic  Neck: Normal range of motion  No thyromegaly present  Cardiovascular: Normal rate, regular rhythm and normal heart sounds  Pulmonary/Chest: Effort normal and breath sounds normal  No respiratory distress  She has no wheezes  Right breast exhibits no inverted nipple, no mass, no nipple discharge, no skin change and no tenderness  Left breast exhibits no inverted nipple, no mass, no nipple discharge and no skin change  Breasts are asymmetrical (b/l breast reduction scars present)  Abdominal: Soft  She exhibits no distension and no mass  There is no tenderness  There is no guarding  Skin: Skin is warm and dry  Psychiatric: She has a normal mood and affect   Her behavior is normal

## 2018-09-19 ENCOUNTER — TELEPHONE (OUTPATIENT)
Dept: GASTROENTEROLOGY | Facility: CLINIC | Age: 28
End: 2018-09-19

## 2018-09-19 NOTE — TELEPHONE ENCOUNTER
----- Message from James Ingram MD sent at 9/18/2018  5:44 PM EDT -----  Stool tests were negative for ova and parasites

## 2018-09-21 LAB
CHLAMYDIA DNA CVX QL NAA+PROBE: NORMAL
N GONORRHOEA DNA GENITAL QL NAA+PROBE: NORMAL

## 2018-09-24 ENCOUNTER — ANESTHESIA EVENT (OUTPATIENT)
Dept: PERIOP | Facility: AMBULARY SURGERY CENTER | Age: 28
End: 2018-09-24
Payer: COMMERCIAL

## 2018-09-27 ENCOUNTER — APPOINTMENT (OUTPATIENT)
Dept: LAB | Facility: MEDICAL CENTER | Age: 28
End: 2018-09-27
Payer: COMMERCIAL

## 2018-09-27 DIAGNOSIS — R19.7 DIARRHEA, UNSPECIFIED TYPE: ICD-10-CM

## 2018-09-27 LAB
ALBUMIN SERPL BCP-MCNC: 3.9 G/DL (ref 3.5–5)
ALP SERPL-CCNC: 44 U/L (ref 46–116)
ALT SERPL W P-5'-P-CCNC: 21 U/L (ref 12–78)
ANION GAP SERPL CALCULATED.3IONS-SCNC: 5 MMOL/L (ref 4–13)
AST SERPL W P-5'-P-CCNC: 13 U/L (ref 5–45)
BASOPHILS # BLD AUTO: 0.04 THOUSANDS/ΜL (ref 0–0.1)
BASOPHILS NFR BLD AUTO: 1 % (ref 0–1)
BILIRUB SERPL-MCNC: 0.3 MG/DL (ref 0.2–1)
BUN SERPL-MCNC: 11 MG/DL (ref 5–25)
CALCIUM SERPL-MCNC: 8.8 MG/DL (ref 8.3–10.1)
CHLORIDE SERPL-SCNC: 105 MMOL/L (ref 100–108)
CO2 SERPL-SCNC: 25 MMOL/L (ref 21–32)
CREAT SERPL-MCNC: 0.67 MG/DL (ref 0.6–1.3)
EOSINOPHIL # BLD AUTO: 0.05 THOUSAND/ΜL (ref 0–0.61)
EOSINOPHIL NFR BLD AUTO: 1 % (ref 0–6)
ERYTHROCYTE [DISTWIDTH] IN BLOOD BY AUTOMATED COUNT: 12.6 % (ref 11.6–15.1)
GFR SERPL CREATININE-BSD FRML MDRD: 121 ML/MIN/1.73SQ M
GLUCOSE P FAST SERPL-MCNC: 86 MG/DL (ref 65–99)
HCT VFR BLD AUTO: 40.3 % (ref 34.8–46.1)
HGB BLD-MCNC: 12.9 G/DL (ref 11.5–15.4)
IMM GRANULOCYTES # BLD AUTO: 0.02 THOUSAND/UL (ref 0–0.2)
IMM GRANULOCYTES NFR BLD AUTO: 0 % (ref 0–2)
LYMPHOCYTES # BLD AUTO: 2.14 THOUSANDS/ΜL (ref 0.6–4.47)
LYMPHOCYTES NFR BLD AUTO: 28 % (ref 14–44)
MCH RBC QN AUTO: 29.3 PG (ref 26.8–34.3)
MCHC RBC AUTO-ENTMCNC: 32 G/DL (ref 31.4–37.4)
MCV RBC AUTO: 91 FL (ref 82–98)
MONOCYTES # BLD AUTO: 0.66 THOUSAND/ΜL (ref 0.17–1.22)
MONOCYTES NFR BLD AUTO: 9 % (ref 4–12)
NEUTROPHILS # BLD AUTO: 4.84 THOUSANDS/ΜL (ref 1.85–7.62)
NEUTS SEG NFR BLD AUTO: 61 % (ref 43–75)
NRBC BLD AUTO-RTO: 0 /100 WBCS
PLATELET # BLD AUTO: 256 THOUSANDS/UL (ref 149–390)
PMV BLD AUTO: 13 FL (ref 8.9–12.7)
POTASSIUM SERPL-SCNC: 4.1 MMOL/L (ref 3.5–5.3)
PROT SERPL-MCNC: 7.3 G/DL (ref 6.4–8.2)
RBC # BLD AUTO: 4.41 MILLION/UL (ref 3.81–5.12)
SODIUM SERPL-SCNC: 135 MMOL/L (ref 136–145)
TSH SERPL DL<=0.05 MIU/L-ACNC: 1.5 UIU/ML (ref 0.36–3.74)
WBC # BLD AUTO: 7.75 THOUSAND/UL (ref 4.31–10.16)

## 2018-09-27 PROCEDURE — 84443 ASSAY THYROID STIM HORMONE: CPT

## 2018-09-27 PROCEDURE — 83516 IMMUNOASSAY NONANTIBODY: CPT

## 2018-09-27 PROCEDURE — 36415 COLL VENOUS BLD VENIPUNCTURE: CPT

## 2018-09-27 PROCEDURE — 80053 COMPREHEN METABOLIC PANEL: CPT

## 2018-09-27 PROCEDURE — 87493 C DIFF AMPLIFIED PROBE: CPT

## 2018-09-27 PROCEDURE — 82784 ASSAY IGA/IGD/IGG/IGM EACH: CPT

## 2018-09-27 PROCEDURE — 86255 FLUORESCENT ANTIBODY SCREEN: CPT

## 2018-09-27 PROCEDURE — 87505 NFCT AGENT DETECTION GI: CPT

## 2018-09-27 PROCEDURE — 85025 COMPLETE CBC W/AUTO DIFF WBC: CPT

## 2018-09-28 LAB
C DIFF TOX GENS STL QL NAA+PROBE: NORMAL
CAMPYLOBACTER DNA SPEC NAA+PROBE: NORMAL
ENDOMYSIUM IGA SER QL: NEGATIVE
GLIADIN PEPTIDE IGA SER-ACNC: 5 UNITS (ref 0–19)
GLIADIN PEPTIDE IGG SER-ACNC: 2 UNITS (ref 0–19)
IGA SERPL-MCNC: 222 MG/DL (ref 87–352)
SALMONELLA DNA SPEC QL NAA+PROBE: NORMAL
SHIGA TOXIN STX GENE SPEC NAA+PROBE: NORMAL
SHIGELLA DNA SPEC QL NAA+PROBE: NORMAL
TTG IGA SER-ACNC: <2 U/ML (ref 0–3)
TTG IGG SER-ACNC: <2 U/ML (ref 0–5)

## 2018-10-02 ENCOUNTER — TELEPHONE (OUTPATIENT)
Dept: GASTROENTEROLOGY | Facility: CLINIC | Age: 28
End: 2018-10-02

## 2018-10-02 NOTE — TELEPHONE ENCOUNTER
----- Message from Mamie Lopez MD sent at 10/1/2018  7:34 PM EDT -----  Please inform the patient that the labs are notable for normal TSH, normal celiac serologies

## 2018-10-04 ENCOUNTER — TELEPHONE (OUTPATIENT)
Dept: GASTROENTEROLOGY | Facility: AMBULARY SURGERY CENTER | Age: 28
End: 2018-10-04

## 2018-10-08 ENCOUNTER — OFFICE VISIT (OUTPATIENT)
Dept: PSYCHIATRY | Facility: CLINIC | Age: 28
End: 2018-10-08
Payer: COMMERCIAL

## 2018-10-08 ENCOUNTER — HOSPITAL ENCOUNTER (OUTPATIENT)
Facility: AMBULARY SURGERY CENTER | Age: 28
Setting detail: OUTPATIENT SURGERY
Discharge: HOME | End: 2018-10-08
Attending: INTERNAL MEDICINE | Admitting: INTERNAL MEDICINE
Payer: COMMERCIAL

## 2018-10-08 ENCOUNTER — ANESTHESIA (OUTPATIENT)
Dept: PERIOP | Facility: AMBULARY SURGERY CENTER | Age: 28
End: 2018-10-08
Payer: COMMERCIAL

## 2018-10-08 ENCOUNTER — TELEPHONE (OUTPATIENT)
Dept: PSYCHIATRY | Facility: CLINIC | Age: 28
End: 2018-10-08

## 2018-10-08 VITALS — BODY MASS INDEX: 30.21 KG/M2 | WEIGHT: 176 LBS

## 2018-10-08 VITALS
WEIGHT: 170 LBS | HEIGHT: 64 IN | TEMPERATURE: 97.5 F | OXYGEN SATURATION: 100 % | DIASTOLIC BLOOD PRESSURE: 67 MMHG | RESPIRATION RATE: 18 BRPM | BODY MASS INDEX: 29.02 KG/M2 | SYSTOLIC BLOOD PRESSURE: 115 MMHG | HEART RATE: 58 BPM

## 2018-10-08 DIAGNOSIS — F43.10 PTSD (POST-TRAUMATIC STRESS DISORDER): ICD-10-CM

## 2018-10-08 DIAGNOSIS — R19.7 DIARRHEA, UNSPECIFIED TYPE: ICD-10-CM

## 2018-10-08 DIAGNOSIS — F33.1 MODERATE EPISODE OF RECURRENT MAJOR DEPRESSIVE DISORDER (HCC): ICD-10-CM

## 2018-10-08 DIAGNOSIS — R10.84 ABDOMINAL PAIN, GENERALIZED: ICD-10-CM

## 2018-10-08 DIAGNOSIS — F41.1 GAD (GENERALIZED ANXIETY DISORDER): Primary | ICD-10-CM

## 2018-10-08 LAB — EXT PREGNANCY TEST URINE: NEGATIVE

## 2018-10-08 PROCEDURE — 45380 COLONOSCOPY AND BIOPSY: CPT | Performed by: INTERNAL MEDICINE

## 2018-10-08 PROCEDURE — 88305 TISSUE EXAM BY PATHOLOGIST: CPT | Performed by: PATHOLOGY

## 2018-10-08 PROCEDURE — 81025 URINE PREGNANCY TEST: CPT | Performed by: INTERNAL MEDICINE

## 2018-10-08 PROCEDURE — 99214 OFFICE O/P EST MOD 30 MIN: CPT | Performed by: PSYCHIATRY & NEUROLOGY

## 2018-10-08 RX ORDER — SODIUM CHLORIDE 9 MG/ML
125 INJECTION, SOLUTION INTRAVENOUS CONTINUOUS
Status: DISCONTINUED | OUTPATIENT
Start: 2018-10-08 | End: 2018-10-08 | Stop reason: HOSPADM

## 2018-10-08 RX ORDER — PRAZOSIN HYDROCHLORIDE 1 MG/1
3 CAPSULE ORAL
Qty: 90 CAPSULE | Refills: 3 | Status: SHIPPED | OUTPATIENT
Start: 2018-10-08 | End: 2019-03-04 | Stop reason: SDUPTHER

## 2018-10-08 RX ORDER — SODIUM CHLORIDE 9 MG/ML
INJECTION, SOLUTION INTRAVENOUS CONTINUOUS PRN
Status: DISCONTINUED | OUTPATIENT
Start: 2018-10-08 | End: 2018-10-08 | Stop reason: SURG

## 2018-10-08 RX ORDER — PROPOFOL 10 MG/ML
INJECTION, EMULSION INTRAVENOUS AS NEEDED
Status: DISCONTINUED | OUTPATIENT
Start: 2018-10-08 | End: 2018-10-08 | Stop reason: SURG

## 2018-10-08 RX ORDER — SERTRALINE HYDROCHLORIDE 100 MG/1
200 TABLET, FILM COATED ORAL DAILY
Qty: 60 TABLET | Refills: 3 | Status: SHIPPED | OUTPATIENT
Start: 2018-10-08 | End: 2019-03-04

## 2018-10-08 RX ORDER — BUSPIRONE HYDROCHLORIDE 7.5 MG/1
TABLET ORAL
Qty: 120 TABLET | Refills: 2 | Status: SHIPPED | OUTPATIENT
Start: 2018-10-08 | End: 2019-01-21 | Stop reason: SDUPTHER

## 2018-10-08 RX ADMIN — PROPOFOL 50 MG: 10 INJECTION, EMULSION INTRAVENOUS at 08:20

## 2018-10-08 RX ADMIN — PROPOFOL 25 MG: 10 INJECTION, EMULSION INTRAVENOUS at 08:17

## 2018-10-08 RX ADMIN — PROPOFOL 40 MG: 10 INJECTION, EMULSION INTRAVENOUS at 08:07

## 2018-10-08 RX ADMIN — PROPOFOL 110 MG: 10 INJECTION, EMULSION INTRAVENOUS at 08:05

## 2018-10-08 RX ADMIN — PROPOFOL 50 MG: 10 INJECTION, EMULSION INTRAVENOUS at 08:12

## 2018-10-08 RX ADMIN — SODIUM CHLORIDE: 0.9 INJECTION, SOLUTION INTRAVENOUS at 07:31

## 2018-10-08 RX ADMIN — PROPOFOL 25 MG: 10 INJECTION, EMULSION INTRAVENOUS at 08:15

## 2018-10-08 RX ADMIN — PROPOFOL 50 MG: 10 INJECTION, EMULSION INTRAVENOUS at 08:09

## 2018-10-08 NOTE — ANESTHESIA PREPROCEDURE EVALUATION
Review of Systems/Medical History  Patient summary reviewed  Chart reviewed  No history of anesthetic complications     Cardiovascular  Negative cardio ROS    Pulmonary  Negative pulmonary ROS        GI/Hepatic            Endo/Other     GYN       Hematology   Musculoskeletal  Rheumatoid arthritis ,   Comment: Ankylosing Spondylitis Arthritis     Neurology    Headaches, Fibromyalgia   Psychology   Psychiatric history, Anxiety, Depression ,              Physical Exam    Airway    Mallampati score: II  TM Distance: >3 FB  Neck ROM: full     Dental       Cardiovascular  Comment: Negative ROS,     Pulmonary      Other Findings        Anesthesia Plan  ASA Score- 2     Anesthesia Type- IV sedation with anesthesia with ASA Monitors  Additional Monitors:   Airway Plan:         Plan Factors-    Induction- intravenous  Postoperative Plan-     Informed Consent- Anesthetic plan and risks discussed with patient  I personally reviewed this patient with the CRNA  Discussed and agreed on the Anesthesia Plan with the CRNA  Sai Gabriel
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
## 2018-10-08 NOTE — TELEPHONE ENCOUNTER
Received a notification via [address] [place] [address] for a prior auth request for prazosin  Request generated for [facility] ([phone])  When I called, I was told the plan is managed by [employer]  When I spoke with the representative, she confirmed prazosin and sertraline were being processed - she was forwarding the request to the   For review by Dr Christina Holland - are these medications related to a workers comp claim or should these medications be processed via [place] q  291?

## 2018-10-08 NOTE — ANESTHESIA PREPROCEDURE EVALUATION
Review of Systems/Medical History  Patient summary reviewed  Chart reviewed  No history of anesthetic complications     Cardiovascular  Negative cardio ROS Exercise tolerance (METS): >4,     Pulmonary  Negative pulmonary ROS        GI/Hepatic  Negative GI/hepatic ROS               Endo/Other     GYN  Negative gynecology ROS          Hematology  Negative hematology ROS      Musculoskeletal  Rheumatoid arthritis ,   Comment: Fibromyalgia  ? Ankylosing spondylitis vs RA      Neurology    Headaches,    Psychology   Psychiatric history, Anxiety, Depression ,              Physical Exam    Airway    Mallampati score: I  TM Distance: >3 FB  Neck ROM: full     Dental   No notable dental hx     Cardiovascular  Comment: Negative ROS, Cardiovascular exam normal    Pulmonary  Pulmonary exam normal     Other Findings        Anesthesia Plan  ASA Score- 2     Anesthesia Type- IV sedation with anesthesia with ASA Monitors  Additional Monitors:   Airway Plan:         Plan Factors-    Induction- intravenous  Postoperative Plan-     Informed Consent- Anesthetic plan and risks discussed with patient and mother

## 2018-10-08 NOTE — ANESTHESIA POSTPROCEDURE EVALUATION
Post-Op Assessment Note      CV Status:  Stable    Hydration Status:  Stable    PONV Controlled:  None    Airway Patency:  Patent    Post Op Vitals Reviewed: Yes          Staff: CRNA           BP   103/65   Temp      Pulse  64   Resp   15   SpO2   98% on RA   Post procedure VS noted above, SV non obstructed

## 2018-10-08 NOTE — H&P
History and Physical -  [facility]  Diego Purcell 33 y o  female MRN: [id number]    HPI: Diego Purcell is a 33y o  year old female who presents with abdominal pain, cramping and change in bowel habnits  Review of Systems    Historical Information   Past Medical History:   Diagnosis Date    [facility]     Depression     Fibromyalgia     [facility]      Past Surgical History:   Procedure Laterality Date    MANDIBLE SURGERY      REDUCTION MAMMAPLASTY       Social History   History   Alcohol Use No     Comment: Alcohol use 1- drinks every week per [employer]     History   Drug Use No     History   Smoking Status    Never Smoker   Smokeless Tobacco    Never Used     Family History   Problem Relation Age of Onset    Osteoporosis Mother    Annita Mare Fibromyalgia Mother     Rheum arthritis Mother     Ulcerative colitis Mother     Cancer Father         adenocarcinoma    Hypertension Family     Ulcers Family         peptic    Kidney disease Family        Meds/Allergies     Prescriptions Prior to Admission   Medication    dicyclomine (BENTYL) 10 mg capsule    ergocalciferol (VITAMIN D2) 50,000 units    lamoTRIgine (LaMICtal) 25 mg tablet    levonorgestrel (MIRENA) 20 MCG/24HR IUD    lidocaine (LIDODERM) 5 %    prazosin (MINIPRESS) 1 mg capsule    pregabalin (LYRICA) 150 mg capsule    rizatriptan (MAXALT) 10 MG tablet    sertraline (ZOLOFT) 100 mg tablet    XELJANZ XR 11 MG TB24       No Known Allergies    Objective     Blood pressure 99/65, pulse 70, temperature 97 5 °F (36 4 °C), resp  rate 18, height 5' 4" (1 626 m), weight 77 1 kg (170 lb), SpO2 96 %, not currently breastfeeding  PHYSICAL EXAM    Gen: NAD  CV: RRR  CHEST: Clear  ABD: soft, NT/ND  EXT: no edema  Neuro: AAO      ASSESSMENT/PLAN:  This is a 33y o  year old female here for abdominal pain  PLAN:   Procedure: colonoscopy

## 2018-10-08 NOTE — OP NOTE
Colonoscopy Procedure Note    Procedure: Colonoscopy    Sedation: Monitored anesthesia care, check anesthesia records      ASA Class: 2    INDICATIONS:  Change in bowel habits with diarrhea, cramping abdominal pain  POST-OP DIAGNOSIS: See the impression below    Procedure Details     Prior colonoscopy: No prior colonoscopy  Informed consent was obtained for the procedure, including sedation  Risks of perforation, hemorrhage, adverse drug reaction and aspiration were discussed  The patient was placed in the left lateral decubitus position  Based on the pre-procedure assessment, including review of the patient's medical history, medications, allergies, and review of systems, she had been deemed to be an appropriate candidate for conscious sedation; she was therefore sedated with the medications listed below  The patient was monitored continuously with telemetry, pulse oximetry, blood pressure monitoring, and direct observations  A rectal examination was performed  The variable-stiffness pediatric colonoscope was inserted into the rectum and advanced under direct vision to the terminal ileum  The quality of the colonic preparation was good  A careful inspection was made as the colonoscope was withdrawn, including a retroflexed view of the rectum; findings and interventions are described below  Findings:  1  Terminal ileal mucosa appeared normal within the distal 10 cm  2  Colonic mucosa appeared completely normal, biopsies were obtained from the ascending and descending colon to assess for microscopic colitis  3  Retroflexed view was notable for small internal hemorrhoids  Complications: None; patient tolerated the procedure well  Impression:    1  Small internal hemorrhoids  Recommendations: Follow-up biopsy results in 2-3 weeks  Repeat colonoscopy when age-appropriate screening is due

## 2018-10-09 NOTE — TELEPHONE ENCOUNTER
I spoke with pharmacist at [facility] and reviewed sertraline and prazosin being processed with worker's comp insurance/open claim vs  [employer]  He said they would process differently and call the office if there was a problem  I also spoke with PHYSICIAN'S [facility] and reviewed same  She acknowledged these scripts should be processed with primary insurance  I suggested she follow up with the pharmacy and call if she needed further assistance

## 2018-10-12 ENCOUNTER — TELEPHONE (OUTPATIENT)
Dept: PSYCHIATRY | Facility: CLINIC | Age: 28
End: 2018-10-12

## 2018-10-12 NOTE — TELEPHONE ENCOUNTER
Recevied additional request from pharmacy for prior auths to be submitted to [facility]  [employer] is the worker [place], but not all meds should be submitted there  See Telephone encounter from 10/08/18 - I spoke with the pharmacist and Ac Shepherd LM for Ac [facility] same and gave her my number to call - I will not submit unless she really does need assistance with a prior auth

## 2018-10-15 DIAGNOSIS — M79.7 FIBROMYALGIA: ICD-10-CM

## 2018-10-15 RX ORDER — PREGABALIN 150 MG/1
150 CAPSULE ORAL 2 TIMES DAILY
Qty: 180 CAPSULE | Refills: 0 | Status: SHIPPED | OUTPATIENT
Start: 2018-10-15 | End: 2019-01-16 | Stop reason: SDUPTHER

## 2018-10-15 RX ORDER — PREGABALIN 150 MG/1
150 CAPSULE ORAL 2 TIMES DAILY
Qty: 20 CAPSULE | Refills: 0 | Status: SHIPPED | OUTPATIENT
Start: 2018-10-15 | End: 2018-10-15 | Stop reason: SDUPTHER

## 2018-10-15 NOTE — TELEPHONE ENCOUNTER
[facility]- states since it was sent to both retail and mail order insurance will not cover both       Pt notified and will pay out of pocket

## 2018-10-15 NOTE — TELEPHONE ENCOUNTER
PT  NEEDS A 90 DAY SUPPLY SENT TO [employer] AND A 10 [facility] IN [place]

## 2018-10-16 ENCOUNTER — TELEPHONE (OUTPATIENT)
Dept: GASTROENTEROLOGY | Facility: AMBULARY SURGERY CENTER | Age: 28
End: 2018-10-16

## 2018-10-16 NOTE — TELEPHONE ENCOUNTER
----- Message from Verenice Alan MD sent at 10/15/2018  6:12 PM EDT -----  Biopsies from the colon were unremarkable  Needs repeat colonoscopy at age of 40  Follow-up p r n

## 2018-10-25 ENCOUNTER — TELEPHONE (OUTPATIENT)
Dept: NEUROLOGY | Facility: CLINIC | Age: 28
End: 2018-10-25

## 2018-10-25 ENCOUNTER — CLINICAL SUPPORT (OUTPATIENT)
Dept: NEUROLOGY | Facility: CLINIC | Age: 28
End: 2018-10-25
Payer: COMMERCIAL

## 2018-10-25 DIAGNOSIS — IMO0002 CHRONIC MIGRAINE: Primary | ICD-10-CM

## 2018-10-25 PROCEDURE — 96372 THER/PROPH/DIAG INJ SC/IM: CPT | Performed by: PHYSICIAN ASSISTANT

## 2018-10-25 RX ORDER — KETOROLAC TROMETHAMINE 30 MG/ML
60 INJECTION, SOLUTION INTRAMUSCULAR; INTRAVENOUS ONCE
Status: COMPLETED | OUTPATIENT
Start: 2018-10-25 | End: 2018-10-25

## 2018-10-25 RX ADMIN — KETOROLAC TROMETHAMINE 60 MG: 30 INJECTION, SOLUTION INTRAMUSCULAR; INTRAVENOUS at 15:42

## 2018-10-25 NOTE — PROGRESS NOTES
Jacinto Whyte presented for a toradol injection today in the upper, outer quadrant of the right gluteal muscle  Verbal consent was obtained prior to the procedure  she tolerated the injection well without complication

## 2018-10-25 NOTE — TELEPHONE ENCOUNTER
pt called asking if she can get a toradol injection  migraine started 1 hour ago  has not taken anything as she is at work and does not have any thing with her     i spoke to [address] and she states that it is ok for pt to come in today    i transfered call to gaviota to schedule

## 2018-10-30 ENCOUNTER — OFFICE VISIT (OUTPATIENT)
Dept: INTERNAL MEDICINE CLINIC | Facility: CLINIC | Age: 28
End: 2018-10-30
Payer: COMMERCIAL

## 2018-10-30 VITALS
HEART RATE: 79 BPM | TEMPERATURE: 98.2 F | SYSTOLIC BLOOD PRESSURE: 116 MMHG | DIASTOLIC BLOOD PRESSURE: 70 MMHG | RESPIRATION RATE: 18 BRPM | OXYGEN SATURATION: 99 % | BODY MASS INDEX: 30.08 KG/M2 | WEIGHT: 176.2 LBS | HEIGHT: 64 IN

## 2018-10-30 DIAGNOSIS — M79.7 FIBROMYALGIA: Primary | ICD-10-CM

## 2018-10-30 PROBLEM — H10.33 ACUTE BACTERIAL CONJUNCTIVITIS OF BOTH EYES: Status: RESOLVED | Noted: 2017-10-20 | Resolved: 2018-10-30

## 2018-10-30 PROCEDURE — 1036F TOBACCO NON-USER: CPT | Performed by: INTERNAL MEDICINE

## 2018-10-30 PROCEDURE — 3008F BODY MASS INDEX DOCD: CPT | Performed by: INTERNAL MEDICINE

## 2018-10-30 PROCEDURE — 99213 OFFICE O/P EST LOW 20 MIN: CPT | Performed by: INTERNAL MEDICINE

## 2018-10-30 NOTE — PROGRESS NOTES
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  Managing stress and denies any other concerns or complaints  Past Medical History:   Diagnosis Date    Ankylosing spondylitis ([facility])     Depression     Fibromyalgia     RA (rheumatoid arthritis) ([facility])      Vitals:    10/30/18 1618   BP: 116/70   Pulse: 79   Resp: 18   Temp: 98 2 °F (36 8 °C)   SpO2: 99%   Weight: 79 9 kg (176 lb 3 2 oz)   Height: 5' 4" (1 626 m)     Body mass index is 30 24 kg/m²  Current Outpatient Prescriptions:     busPIRone (BUSPAR) 7 5 mg tablet, Take 7 5mg BID  After 1 week, increase to 7 5mg in AM and 15mg HS  After 1 more week, increase to 15mg BID , Disp: 120 tablet, Rfl: 2    dicyclomine (BENTYL) 10 mg capsule, Take 1 capsule (10 mg total) by mouth 3 (three) times a day as needed (abdominal pain and diarrhea), Disp: 90 capsule, Rfl: 3    ergocalciferol (VITAMIN D2) 50,000 units, Take 50,000 Units by mouth once a week, Disp: , Rfl:     lamoTRIgine (LaMICtal) 25 mg tablet, One tab q h s  x1 week, then 2 tabs q h s  x1 week, then 3 tabs q h s  x1 week, then 4 tabs q h s   Call with rash , Disp: 120 tablet, Rfl: 2    levonorgestrel (MIRENA) 20 MCG/24HR IUD, 1 each by Intrauterine route once, Disp: , Rfl:     lidocaine (LIDODERM) 5 %, Place 2 patches on the skin daily Remove after 12 hours, Disp: 60 patch, Rfl: 5    prazosin (MINIPRESS) 1 mg capsule, Take 3 capsules (3 mg total) by mouth daily at bedtime, Disp: 90 capsule, Rfl: 3    pregabalin (LYRICA) 150 mg capsule, Take 1 capsule (150 mg total) by mouth 2 (two) times a day, Disp: 180 capsule, Rfl: 0    rizatriptan (MAXALT) 10 MG tablet, May repeat in 2 hours if needed, Disp: 9 tablet, Rfl: 3    sertraline (ZOLOFT) 100 mg tablet, Take 2 tablets (200 mg total) by mouth daily, Disp: 60 tablet, Rfl: 3    XELJANZ XR 11 MG TB24, , Disp: , Rfl:   No Known Allergies      Review of Systems   Constitutional: Negative for fever  HENT: Negative for congestion  Eyes: Negative for visual disturbance  Respiratory: Negative for shortness of breath  Cardiovascular: Negative for chest pain and leg swelling  Gastrointestinal: Negative for abdominal pain  Genitourinary: Negative for dysuria  Musculoskeletal: Negative for arthralgias  Skin: Negative for rash  Neurological: Negative for headaches  Psychiatric/Behavioral: Negative for dysphoric mood  Objective:      /70   Pulse 79   Temp 98 2 °F (36 8 °C)   Resp 18   Ht 5' 4" (1 626 m)   Wt 79 9 kg (176 lb 3 2 oz)   SpO2 99%   BMI 30 24 kg/m²          Physical Exam   Constitutional: She appears well-developed and well-nourished  HENT:   Head: Normocephalic and atraumatic  Mouth/Throat: Oropharynx is clear and moist    Eyes: Pupils are equal, round, and reactive to light  Conjunctivae are normal    Cardiovascular: Normal rate, regular rhythm and normal heart sounds  No murmur heard  Pulmonary/Chest: Effort normal and breath sounds normal  She has no wheezes  She has no rales  Abdominal: Soft  Bowel sounds are normal  There is no tenderness  Musculoskeletal: She exhibits no edema  Neurological: She is alert  Psychiatric: She has a normal mood and affect  Her behavior is normal    Vitals reviewed

## 2018-10-31 ENCOUNTER — TELEPHONE (OUTPATIENT)
Dept: PSYCHIATRY | Facility: CLINIC | Age: 28
End: 2018-10-31

## 2018-10-31 NOTE — TELEPHONE ENCOUNTER
Received a prior auth request from [employer] (via [employer]) for prazosin and buspirone to be submitted to [employer]  See Encounter from 10/12/2018 - [address] is [employer] with worker's comp and these medications should be processed via [employer]  DANIELLE on perscriber voice mail and for [facility]

## 2018-11-09 ENCOUNTER — OFFICE VISIT (OUTPATIENT)
Dept: BEHAVIORAL/MENTAL HEALTH CLINIC | Facility: CLINIC | Age: 28
End: 2018-11-09
Payer: COMMERCIAL

## 2018-11-09 DIAGNOSIS — F41.1 GAD (GENERALIZED ANXIETY DISORDER): Primary | ICD-10-CM

## 2018-11-09 DIAGNOSIS — F43.10 PTSD (POST-TRAUMATIC STRESS DISORDER): ICD-10-CM

## 2018-11-09 DIAGNOSIS — Z72.89 SELF-INJURIOUS BEHAVIOR: ICD-10-CM

## 2018-11-09 DIAGNOSIS — F50.9 EATING DISORDER: ICD-10-CM

## 2018-11-09 DIAGNOSIS — F33.1 MODERATE EPISODE OF RECURRENT MAJOR DEPRESSIVE DISORDER (HCC): ICD-10-CM

## 2018-11-09 PROCEDURE — 90834 PSYTX W PT 45 MINUTES: CPT | Performed by: SOCIAL WORKER

## 2018-11-09 NOTE — PSYCH
Psychotherapy Provided: Individual Psychotherapy 50 minutes     Length of time in session: 50 minutes, follow up in 2 week    Goals addressed in session: Goal 1, Goal 2 and Goal 3      Pain:      none    0    Current suicide risk : Low     D: Met with Catalina chata  ROS; 'It's been school and work mainly'  Seeing a man Ramya Hanson that she has a lot in common with  He is heavily into fitness and this has challenged Catalina's body image  Ramya Hanson tries to reassure her that she is attractive but it's difficult for her  'Taking things slow'  Testing emotions and feels she needs reassurance 'all the time'  Purging after each meal for past 2 weeks (even at work)  'Is done fighting the urge' and feels she wants to do it   Further anxiety with school triggered biting her lip with the intent to cause pain  Discussed art work provided by a man after she gave him a poem of her past   Became very emotional when she first saw  Discussed symbols and why she chose that particular poem  Reassessment of treatment plan completed  A: Woody Herzog presented with depressed mood and constricted affect  While this relationship is great for her, she is struggling with self worth and possibly trying to self sabotage  Dad's Anniversary this month as well  Multiple factors to take into account for above exhibiting behaviors  P: Continue individual therapy, address above symptoms and topics in greater detail    [address]: Diagnosis and Treatment Plan explained to Samaria Parnell relates understanding diagnosis and is agreeable to Treatment Plan   Yes

## 2018-11-09 NOTE — PSYCH
Treatment Plan Tracking    # 5Treatment Plan not completed within required time limits due to: Client cancelled/no-showed scheduled appointment  McGregor Organ
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
## 2018-11-26 ENCOUNTER — DOCUMENTATION (OUTPATIENT)
Dept: PSYCHIATRY | Facility: CLINIC | Age: 28
End: 2018-11-26

## 2018-11-27 DIAGNOSIS — S09.90XS TRAUMATIC INJURY OF HEAD, SEQUELA: ICD-10-CM

## 2018-11-27 DIAGNOSIS — IMO0002 CHRONIC MIGRAINE: ICD-10-CM

## 2018-11-27 RX ORDER — LAMOTRIGINE 25 MG/1
100 TABLET ORAL
Qty: 120 TABLET | Refills: 2 | Status: SHIPPED | OUTPATIENT
Start: 2018-11-27 | End: 2018-12-04 | Stop reason: SDUPTHER

## 2018-11-28 ENCOUNTER — TELEPHONE (OUTPATIENT)
Dept: NEUROLOGY | Facility: CLINIC | Age: 28
End: 2018-11-28

## 2018-12-04 ENCOUNTER — OFFICE VISIT (OUTPATIENT)
Dept: NEUROLOGY | Facility: CLINIC | Age: 28
End: 2018-12-04
Payer: OTHER MISCELLANEOUS

## 2018-12-04 VITALS
HEART RATE: 63 BPM | SYSTOLIC BLOOD PRESSURE: 116 MMHG | BODY MASS INDEX: 30.38 KG/M2 | WEIGHT: 177 LBS | DIASTOLIC BLOOD PRESSURE: 82 MMHG

## 2018-12-04 DIAGNOSIS — S09.90XS TRAUMATIC INJURY OF HEAD, SEQUELA: ICD-10-CM

## 2018-12-04 DIAGNOSIS — G43.109 MIGRAINE WITH AURA AND WITHOUT STATUS MIGRAINOSUS, NOT INTRACTABLE: ICD-10-CM

## 2018-12-04 DIAGNOSIS — G43.709 CHRONIC MIGRAINE WITHOUT AURA WITHOUT STATUS MIGRAINOSUS, NOT INTRACTABLE: Primary | ICD-10-CM

## 2018-12-04 PROCEDURE — 96372 THER/PROPH/DIAG INJ SC/IM: CPT

## 2018-12-04 PROCEDURE — 99213 OFFICE O/P EST LOW 20 MIN: CPT

## 2018-12-04 RX ORDER — LAMOTRIGINE 100 MG/1
100 TABLET ORAL
Qty: 90 TABLET | Refills: 1 | Status: SHIPPED | OUTPATIENT
Start: 2018-12-04 | End: 2019-07-25 | Stop reason: ALTCHOICE

## 2018-12-04 RX ORDER — KETOROLAC TROMETHAMINE 30 MG/ML
60 INJECTION, SOLUTION INTRAMUSCULAR; INTRAVENOUS ONCE
Status: COMPLETED | OUTPATIENT
Start: 2018-12-04 | End: 2018-12-04

## 2018-12-04 RX ADMIN — KETOROLAC TROMETHAMINE 60 MG: 30 INJECTION, SOLUTION INTRAMUSCULAR; INTRAVENOUS at 16:12

## 2018-12-04 NOTE — PROGRESS NOTES
Patient ID: Eleazar Raygoza is a 33 y o  female  Assessment/Plan:     Lamictal helps partially with the headaches  For migraine prevention continue Lamictal at 100 mg q h s , and we are going to try botulinum toxin injections q 3 months that 155 units with Dr Daniel Francisco  The patient understands potential common side effects of the Botox, and a detailed informed consent was provided along with the procedure lay out and pamphlet to go home with  She continues Maxalt p r n  Migraine onset  She continues to see Psychiatry and is taking BuSpar  She takes Lyrica for the fibromyalgia  She has not had any head injury since last seen at her job but she works in a place where she is prone to them as 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  We again discussed that she needs to find a new job but she states she is getting tuition coverage through this job and is taking classes to become a principal   I asked her to be as careful as possible with the kids and if she can avoid injury, to do so  Problem List Items Addressed This Visit        Cardiovascular and Mediastinum    Chronic migraine without aura without status migrainosus, not intractable - Primary    Relevant Medications    lamoTRIgine (LaMICtal) 100 mg tablet    ketorolac (TORADOL) 60 mg/2 mL IM injection 60 mg (Completed)    Other Relevant Orders    Chemodenervation       Other    Head injury due to trauma    Relevant Medications    lamoTRIgine (LaMICtal) 100 mg tablet             Subjective:    HPI    Eleazar Raygoza  Is a pleasant 21-year-old female who is here for migraine headache s/p traumatic head injury at work  She has been doing well on Lamictal since I last saw her in denies any side effects  Lamictal is partially effective, but she does want to continue this    We discussed Botox injections in great detail today especially since she continues to have 15 days out of the month with a migraine despite using Lamictal and various other classes of migraine prevention medications as noted below  Her headaches typically last greater than 4 hr, if not the entire day  Noted at the last visit, she continues to have more memory issues, improved after stopping  Trokendi XR  "For ex, it is taking her "forever to read" things, she finds it difficult to read up close on cell phone, books, etc, and has constant word finding issues  "     Per her WC requirement, she saw another neurologist who told her that these sxs may be something she has to deal with for the rest of her life      She contineus to have chronic migraine headaches >/15 days per month and last the entire day  Has tried and failed so far: topamax, trokendi XR, TCAs, lyrica, zoloft, prozac and now lamictal  BP too low to initiate BB or CCB and she already has lightheadedness which I do not want to exacerbate  Objective:    Blood pressure 116/82, pulse 63, weight 80 3 kg (177 lb), not currently breastfeeding  Physical Exam    Neurological Exam  Vital signs reviewed  Well developed, well nourished  Head: Normocephalic, atraumatic  CN 6-41: intact and symmetric, including EOMs which are normal b/l and PERRL  Fundi b/l are normal to crude ophthalmological examination  Mildly photophobic bilaterally  MSK: 5/5 t/o  ROM normal x all 4 extr  Sensation: Inact to LT and temp x4 extr  Reflexes: 2+ and symmetric in all 4 extr  Coordination: Nml x4 extr  Gait: Steady normal gait  ROS:    Review of Systems   Constitutional: Positive for fatigue  HENT: Negative  Eyes: Negative  Respiratory: Negative  Cardiovascular: Negative  Gastrointestinal: Negative  Endocrine: Negative  Genitourinary: Negative  Musculoskeletal: Positive for neck pain  Skin: Negative  Allergic/Immunologic: Negative  Neurological: Positive for weakness and headaches  Forgetfulness     Hematological: Bruises/bleeds easily  Psychiatric/Behavioral: Positive for decreased concentration  The following portions of the patient's history were reviewed and updated as appropriate: allergies, current medications/ medication history, past family history, past medical history, past social history, past surgical history and problem list     Review of systems was reviewed and otherwise unremarkable from a neurological perspective

## 2018-12-04 NOTE — TELEPHONE ENCOUNTER
I received this request for refill today but she was seen by you today   Kristen Roberts Did you fill it?, will she be continued on it? Kristen Blend   Let me know so I can decide to fill it accordingly

## 2018-12-05 ENCOUNTER — TELEPHONE (OUTPATIENT)
Dept: NEUROLOGY | Facility: CLINIC | Age: 28
End: 2018-12-05

## 2018-12-05 NOTE — TELEPHONE ENCOUNTER
Patient scheduled with Dr Dirk Tucker on 12/27/18 at 3pm  I did explain that we cannot authorize workman's comp for her botox- patient verbally understood and will find out if she can get reimbursed  Had a discussion at length about co-pay for botox- I made her aware that once we get to that stage I will be contacting her to contact the speciality pharmacy to take care of consent to ship and payment  Patient verbally understood

## 2018-12-05 NOTE — TELEPHONE ENCOUNTER
----- Message from Ricco Ashby sent at 12/5/2018 10:19 AM EST -----  Regarding: guido Cox,    Please schedule the patient for a new pt botox with Dr Koki Oro  Patient will be specialty pharmacy      Please advise    Thank you,  Soheila :)

## 2018-12-06 RX ORDER — RIZATRIPTAN BENZOATE 10 MG/1
TABLET ORAL
Qty: 9 TABLET | Refills: 3 | Status: SHIPPED | OUTPATIENT
Start: 2018-12-06 | End: 2019-09-30

## 2018-12-15 DIAGNOSIS — G43.709 CHRONIC MIGRAINE WITHOUT AURA WITHOUT STATUS MIGRAINOSUS, NOT INTRACTABLE: Primary | ICD-10-CM

## 2018-12-15 DIAGNOSIS — F43.10 PTSD (POST-TRAUMATIC STRESS DISORDER): ICD-10-CM

## 2018-12-18 RX ORDER — TOPIRAMATE 50 MG/1
50 CAPSULE, EXTENDED RELEASE ORAL
Qty: 30 CAPSULE | Refills: 5 | Status: SHIPPED | OUTPATIENT
Start: 2018-12-18 | End: 2018-12-18 | Stop reason: CLARIF

## 2018-12-18 NOTE — TELEPHONE ENCOUNTER
[facility] Trokendi XR scripts I electronically sent  She stopped this and now takes lamictal instead

## 2018-12-19 ENCOUNTER — TELEPHONE (OUTPATIENT)
Dept: NEUROLOGY | Facility: CLINIC | Age: 28
End: 2018-12-19

## 2018-12-19 NOTE — TELEPHONE ENCOUNTER
lmom awaiting a call back- when patient calls back I can speak with her- I am at the [facility] location today and tomorrow      Thank you

## 2018-12-19 NOTE — TELEPHONE ENCOUNTER
Received notification that Lamotrigine requires PA  [facility] and I spoke with [facility]ist   She reports it was being run incorrectly and that she was able to receive approval   She will notify patient when medication is ready to be picked up

## 2018-12-19 NOTE — TELEPHONE ENCOUNTER
Pt called back  I gave her the message below  She had a few more questions for you   Can you please call her back at [phone]

## 2018-12-19 NOTE — TELEPHONE ENCOUNTER
lmom awaiting a call back  I wanted to let the patient know that her Botox is all ready to be delivered  Patient does have a zero dollar co-pay  If patient is agreeable to continue with getting Botox will need her to call [facility] to give consent to ship        Phone: [phone]

## 2018-12-21 ENCOUNTER — CLINICAL SUPPORT (OUTPATIENT)
Dept: NEUROLOGY | Facility: CLINIC | Age: 28
End: 2018-12-21
Payer: COMMERCIAL

## 2018-12-21 ENCOUNTER — TELEPHONE (OUTPATIENT)
Dept: NEUROLOGY | Facility: CLINIC | Age: 28
End: 2018-12-21

## 2018-12-21 DIAGNOSIS — G43.709 CHRONIC MIGRAINE WITHOUT AURA WITHOUT STATUS MIGRAINOSUS, NOT INTRACTABLE: Primary | ICD-10-CM

## 2018-12-21 PROCEDURE — 96372 THER/PROPH/DIAG INJ SC/IM: CPT

## 2018-12-21 RX ORDER — KETOROLAC TROMETHAMINE 30 MG/ML
60 INJECTION, SOLUTION INTRAMUSCULAR; INTRAVENOUS ONCE
Status: COMPLETED | OUTPATIENT
Start: 2018-12-21 | End: 2018-12-21

## 2018-12-21 RX ADMIN — KETOROLAC TROMETHAMINE 60 MG: 30 INJECTION, SOLUTION INTRAMUSCULAR; INTRAVENOUS at 14:04

## 2018-12-21 NOTE — PROGRESS NOTES
Procedures  Patient presents today 12/21/18 for a Toradol injection in the Right Upper Outer Quadrant  Verbal consent was obtained prior to injection  She tolerated the injection well and without complication

## 2018-12-21 NOTE — TELEPHONE ENCOUNTER
pt called and is asking if she can come in for a toradol injection today  she has migraine that began about 20 min ago   + N/V   8/10  has no taken any medication for this  i spoke to supa and she spoke to [facility] and ok to come in for toradol injection  she will come at 1pm   supa made aware    she will schedule

## 2018-12-26 DIAGNOSIS — R19.7 DIARRHEA, UNSPECIFIED TYPE: ICD-10-CM

## 2018-12-26 RX ORDER — DICYCLOMINE HYDROCHLORIDE 10 MG/1
10 CAPSULE ORAL 3 TIMES DAILY PRN
Qty: 90 CAPSULE | Refills: 3 | Status: SHIPPED | OUTPATIENT
Start: 2018-12-26 | End: 2019-04-30 | Stop reason: SDUPTHER

## 2018-12-27 ENCOUNTER — OFFICE VISIT (OUTPATIENT)
Dept: BEHAVIORAL/MENTAL HEALTH CLINIC | Facility: CLINIC | Age: 28
End: 2018-12-27
Payer: COMMERCIAL

## 2018-12-27 ENCOUNTER — DOCUMENTATION (OUTPATIENT)
Dept: NEUROLOGY | Facility: CLINIC | Age: 28
End: 2018-12-27

## 2018-12-27 ENCOUNTER — PROCEDURE VISIT (OUTPATIENT)
Dept: NEUROLOGY | Facility: CLINIC | Age: 28
End: 2018-12-27
Payer: COMMERCIAL

## 2018-12-27 VITALS — DIASTOLIC BLOOD PRESSURE: 80 MMHG | TEMPERATURE: 98.2 F | SYSTOLIC BLOOD PRESSURE: 120 MMHG

## 2018-12-27 DIAGNOSIS — F50.9 EATING DISORDER: ICD-10-CM

## 2018-12-27 DIAGNOSIS — F43.10 PTSD (POST-TRAUMATIC STRESS DISORDER): ICD-10-CM

## 2018-12-27 DIAGNOSIS — F45.8 BRUXISM: ICD-10-CM

## 2018-12-27 DIAGNOSIS — F33.1 MODERATE EPISODE OF RECURRENT MAJOR DEPRESSIVE DISORDER (HCC): Primary | ICD-10-CM

## 2018-12-27 DIAGNOSIS — G43.719 INTRACTABLE CHRONIC MIGRAINE WITHOUT AURA AND WITHOUT STATUS MIGRAINOSUS: Primary | ICD-10-CM

## 2018-12-27 DIAGNOSIS — Z72.89 SELF-INJURIOUS BEHAVIOR: ICD-10-CM

## 2018-12-27 DIAGNOSIS — F41.1 GAD (GENERALIZED ANXIETY DISORDER): ICD-10-CM

## 2018-12-27 PROCEDURE — 90834 PSYTX W PT 45 MINUTES: CPT | Performed by: SOCIAL WORKER

## 2018-12-27 NOTE — PSYCH
Psychotherapy Provided: Individual Psychotherapy 50 minutes     Length of time in session: 50 minutes, follow up in 2 week    Goals addressed in session: Goal 1, Goal 2 and Goal 3      Pain:      none    0    Current suicide risk : Low     D: Met with Catalina chata  ROS; 'Catlaina's life sucks right now'   'I'm just coasting and blah '  Recent break up with Hayden Huynh; he did it via text  Still in need of 'constant reassurance'  Frequently asked Hayden Huynh what he thought of her  Briefly resumed discussion of picture related to poem Hung Varela had provided to an artist   Raffy Uribe continues - some days after each meal (twice a day) and some days doesn't do do at all  Triggers include feeling 'fat' or being depressed and wanting to 'get feelings out'  Continues to go out with friends dancing  States 'doesn't want male attention' when out  Experiences anxiety when going up to the bar; 'takes me several minutes before I do it'  Discussed starting Botox injections for migraines today and will be coming off Lamotrigine  Denied SI       A: Hung Varela presented with depressed mood and constricted affect  Continues to struggle with self worth and medical issues can get her down  Minimal progress regarding mood  P: Continue individual therapy, address above symptoms and topics in greater detail    [address]: Diagnosis and Treatment Plan explained to Britney Raygoza relates understanding diagnosis and is agreeable to Treatment Plan   Yes

## 2018-12-27 NOTE — PROGRESS NOTES
Chemodenervation  Date/Time: 1/2/2019 4:26 PM  Performed by: Alejandrina Reyes  Authorized by: Jaydon Corbett details:     Prepped With: Alcohol    Procedure details:     Position:  Supine and upright  Botox:     Botox Type:  Type A    Brand:  Botox    mL's of Botulinum Toxin:  165    mL's of preservative free sterile saline:  2    Final Concentration per CC:  100 units    Needle Gauge:  30 G 2 5 inch  Procedures:     Botox Procedures: chronic headache      Indications: migraines    Injection Location:     Head / Face:  L superior cervical paraspinal, R superior cervical paraspinal, L , R , R frontalis, L frontalis, L lateral occipitalis, R lateral occipitalis, procerus, L temporalis, R temporalis, L superior trapezius, R superior trapezius and masseter    L  injection amount:  5 unit(s)    R  injection amount:  5 unit(s)    L lateral frontalis:  5 unit(s)    R lateral frontalis:  5 unit(s)    L medial frontalis:  5 unit(s)    R medial frontalis:  5 unit(s)    L temporalis injection amount:  20 unit(s)    R temporalis injection amount:  20 unit(s)    Procerus injection amount:  5 unit(s)    L Masseter injection amount:  5 unit(s)    R Masseter injection amount:  5 unit(s)    L lateral occipitalis injection amount:  15 unit(s)    R lateral occipitalis injection amount:  15 unit(s)    L superior cervical paraspinal injection amount:  10 unit(s)    R superior cervical paraspinal injection amount:  10 unit(s)    L superior trapezius injection amount:  15 unit(s)    R superior trapezius injection amount:  15 unit(s)  Total Units:     Total units used:  165    Total units discarded:  35  Post-procedure details:     Chemodenervation:  Chronic migraine    Facial Nerve Location[de-identified]  Bilateral facial nerve    Patient tolerance of procedure:   Tolerated well, no immediate complications  Comments:      Injected 5 units in each masseter medically necessary given chronic jaw pain, hypertonicity bruxism that can certainly contribute to migraines as well      This was her first set of injections  Potential med a/e including ptosis, worse headache, neck weakness rash reviewed  D/w her can take upto 2nd/3rd set of injections to note maximal benefit      Will follow up in three months time

## 2018-12-27 NOTE — PROGRESS NOTES
Botox 200 units received from [facility] on 12/27/18 at the [facility]  Botox stored in the fridge until patient's upcoming appointment on 12/27/18

## 2018-12-28 NOTE — TELEPHONE ENCOUNTER
Approved Medications   dicyclomine (BENTYL) 10 mg capsule  Take 1 capsule (10 mg total) by mouth 3 (three) times a day as needed (abdominal pain and diarrhea)       Disp: 90 capsule Refills: 3    Class: Normal Start: 12/26/2018   For: Diarrhea, unspecified type  Approved by: Les Bruce PA-C  To be filled at: [facility]/pharmacy #[id number], [facility]: [phone]

## 2019-01-02 PROCEDURE — 64615 CHEMODENERV MUSC MIGRAINE: CPT | Performed by: PSYCHIATRY & NEUROLOGY

## 2019-01-07 ENCOUNTER — OFFICE VISIT (OUTPATIENT)
Dept: BEHAVIORAL/MENTAL HEALTH CLINIC | Facility: CLINIC | Age: 29
End: 2019-01-07
Payer: COMMERCIAL

## 2019-01-07 DIAGNOSIS — Z72.89 SELF-INJURIOUS BEHAVIOR: ICD-10-CM

## 2019-01-07 DIAGNOSIS — F33.1 MODERATE EPISODE OF RECURRENT MAJOR DEPRESSIVE DISORDER (HCC): ICD-10-CM

## 2019-01-07 DIAGNOSIS — F41.1 GAD (GENERALIZED ANXIETY DISORDER): ICD-10-CM

## 2019-01-07 DIAGNOSIS — F50.9 EATING DISORDER: Primary | ICD-10-CM

## 2019-01-07 DIAGNOSIS — F43.10 PTSD (POST-TRAUMATIC STRESS DISORDER): ICD-10-CM

## 2019-01-07 PROCEDURE — 90834 PSYTX W PT 45 MINUTES: CPT | Performed by: SOCIAL WORKER

## 2019-01-07 NOTE — PSYCH
Psychotherapy Provided: Individual Psychotherapy 50 minutes     Length of time in session: 50 minutes, follow up in 2 week    Goals addressed in session: Goal 1, Goal 2 and Goal 3      Pain:      none    0    Current suicide risk : Low     D: Met with Catalina individually  ROS; 'Catalina's life still sucks'  Denied nightmares though is waking up with 'night sweats'  Stated feels 'judged, dirty and yuk '  Went to party at brother's and felt 'judged'  Smoked marijuana and felt much better  Discussed judjements in addition to urges to change exterior of self triggering purging  'I set the stage with dim lights and music '  Eating 'clean' and healthy in correct portions though tells self 'your a fat cow'  Minimal effort required to purge which 'worries her'  Medication required for other conditions are 'too much'  Averages 24 pills a day  Discussed getting medical marijuana card to remove some medications  Denied SI       A: Chavo Villanueva presented with depressed mood and constricted affect  Continues to struggle with self worth and medical issues can get her down  Minimal progress regarding mood  Projecting thoughts on to others and they are beginning to notice in social situations      P: Continue individual therapy, address above symptoms and topics in greater detail    [address]: Diagnosis and Treatment Plan explained to Precious Hodge relates understanding diagnosis and is agreeable to Treatment Plan   Yes

## 2019-01-16 DIAGNOSIS — M79.7 FIBROMYALGIA: ICD-10-CM

## 2019-01-16 RX ORDER — PREGABALIN 150 MG/1
150 CAPSULE ORAL 2 TIMES DAILY
Qty: 180 CAPSULE | Refills: 1 | Status: SHIPPED | OUTPATIENT
Start: 2019-01-16 | End: 2019-08-13 | Stop reason: SDUPTHER

## 2019-01-18 ENCOUNTER — DOCUMENTATION (OUTPATIENT)
Dept: PSYCHIATRY | Facility: CLINIC | Age: 29
End: 2019-01-18

## 2019-01-18 NOTE — PROGRESS NOTES
Appointment 1/17/2019 was cancelled due to Provider being out of the office, Will RS at a later time

## 2019-01-19 DIAGNOSIS — F41.1 GAD (GENERALIZED ANXIETY DISORDER): ICD-10-CM

## 2019-01-19 DIAGNOSIS — F33.1 MODERATE EPISODE OF RECURRENT MAJOR DEPRESSIVE DISORDER (HCC): ICD-10-CM

## 2019-01-19 DIAGNOSIS — F43.10 PTSD (POST-TRAUMATIC STRESS DISORDER): ICD-10-CM

## 2019-01-19 RX ORDER — BUSPIRONE HYDROCHLORIDE 7.5 MG/1
TABLET ORAL
Qty: 120 TABLET | Refills: 2 | Status: CANCELLED | OUTPATIENT
Start: 2019-01-19

## 2019-01-21 ENCOUNTER — OFFICE VISIT (OUTPATIENT)
Dept: BEHAVIORAL/MENTAL HEALTH CLINIC | Facility: CLINIC | Age: 29
End: 2019-01-21
Payer: COMMERCIAL

## 2019-01-21 ENCOUNTER — TELEPHONE (OUTPATIENT)
Dept: BEHAVIORAL/MENTAL HEALTH CLINIC | Facility: CLINIC | Age: 29
End: 2019-01-21

## 2019-01-21 DIAGNOSIS — F33.1 MODERATE EPISODE OF RECURRENT MAJOR DEPRESSIVE DISORDER (HCC): ICD-10-CM

## 2019-01-21 DIAGNOSIS — F41.1 GAD (GENERALIZED ANXIETY DISORDER): Primary | ICD-10-CM

## 2019-01-21 DIAGNOSIS — F43.10 PTSD (POST-TRAUMATIC STRESS DISORDER): ICD-10-CM

## 2019-01-21 DIAGNOSIS — F41.1 GAD (GENERALIZED ANXIETY DISORDER): ICD-10-CM

## 2019-01-21 DIAGNOSIS — Z72.89 SELF-INJURIOUS BEHAVIOR: ICD-10-CM

## 2019-01-21 DIAGNOSIS — F50.9 EATING DISORDER: ICD-10-CM

## 2019-01-21 PROCEDURE — 90834 PSYTX W PT 45 MINUTES: CPT | Performed by: SOCIAL WORKER

## 2019-01-21 RX ORDER — BUSPIRONE HYDROCHLORIDE 15 MG/1
15 TABLET ORAL 2 TIMES DAILY
Qty: 60 TABLET | Refills: 1 | Status: SHIPPED | OUTPATIENT
Start: 2019-01-21 | End: 2019-03-04 | Stop reason: SDUPTHER

## 2019-01-21 NOTE — PSYCH
Psychotherapy Provided: Individual Psychotherapy 50 minutes     Length of time in session: 50 minutes, follow up in 2 week    Goals addressed in session: Goal 1, Goal 2 and Goal 3      Pain:      none    0    Current suicide risk : Low     D: Met with Catalina ALATORRE; 'I'm stressed'  Session focused upon relationships; trying to feel content with not having someone  Healing the inside verses loosing weight to 'change everything'  Father's death is a contributing factor to self esteem  Grief was never addressed  "Ramey in my head is exhausting ' Stated pruging behavior has improved  Can go approximately 4 days then purge every meal  Able to replace self injurious behaviors lately with 'squeezing my hands'  Denied digging nails or pulling hair  Denied SI       A: Karen Miller presented with depressed mood and constricted affect  Continues to struggle with self worth and medical issues can get her down   Minimal progress regarding mood  Father's death is still very much a factor as she repressed it for so many years      P: Continue individual therapy, address above symptoms and topics in greater detail    [address]: Diagnosis and Treatment Plan explained to Janneth Keller relates understanding diagnosis and is agreeable to Treatment Plan   Yes

## 2019-01-21 NOTE — TELEPHONE ENCOUNTER
Please let her know that I will change pill size to 15mg   She will be taking the same dose, just less pills

## 2019-01-21 NOTE — TELEPHONE ENCOUNTER
Can you find out what dose she is taking now so I can change pill size?     Thanks,  Catracho Solorzano

## 2019-01-22 NOTE — TELEPHONE ENCOUNTER
Called Catalina's cell phone and left VM to explain changes made to Buspar pill size per Dr Brent Bueno  Requested return call

## 2019-01-23 DIAGNOSIS — F41.1 GAD (GENERALIZED ANXIETY DISORDER): ICD-10-CM

## 2019-01-23 DIAGNOSIS — F33.1 MODERATE EPISODE OF RECURRENT MAJOR DEPRESSIVE DISORDER (HCC): ICD-10-CM

## 2019-01-23 DIAGNOSIS — F43.10 PTSD (POST-TRAUMATIC STRESS DISORDER): ICD-10-CM

## 2019-01-23 RX ORDER — BUSPIRONE HYDROCHLORIDE 7.5 MG/1
TABLET ORAL
Qty: 120 TABLET | Refills: 2 | OUTPATIENT
Start: 2019-01-23

## 2019-01-29 ENCOUNTER — OFFICE VISIT (OUTPATIENT)
Dept: BEHAVIORAL/MENTAL HEALTH CLINIC | Facility: CLINIC | Age: 29
End: 2019-01-29
Payer: COMMERCIAL

## 2019-01-29 DIAGNOSIS — F41.1 GAD (GENERALIZED ANXIETY DISORDER): Primary | ICD-10-CM

## 2019-01-29 DIAGNOSIS — F50.9 EATING DISORDER: ICD-10-CM

## 2019-01-29 DIAGNOSIS — F33.1 MODERATE EPISODE OF RECURRENT MAJOR DEPRESSIVE DISORDER (HCC): ICD-10-CM

## 2019-01-29 PROCEDURE — 90834 PSYTX W PT 45 MINUTES: CPT | Performed by: SOCIAL WORKER
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
## 2019-02-06 ENCOUNTER — APPOINTMENT (OUTPATIENT)
Dept: LAB | Facility: MEDICAL CENTER | Age: 29
End: 2019-02-06
Payer: COMMERCIAL

## 2019-02-06 ENCOUNTER — TRANSCRIBE ORDERS (OUTPATIENT)
Dept: ADMINISTRATIVE | Facility: HOSPITAL | Age: 29
End: 2019-02-06

## 2019-02-06 DIAGNOSIS — Z51.81 THERAPEUTIC DRUG MONITORING: ICD-10-CM

## 2019-02-06 DIAGNOSIS — M06.00 SERONEGATIVE RHEUMATOID ARTHRITIS (HCC): ICD-10-CM

## 2019-02-06 DIAGNOSIS — M06.00 SERONEGATIVE RHEUMATOID ARTHRITIS (HCC): Primary | ICD-10-CM

## 2019-02-06 LAB
ALBUMIN SERPL BCP-MCNC: 4.3 G/DL (ref 3.5–5)
ALP SERPL-CCNC: 39 U/L (ref 46–116)
ALT SERPL W P-5'-P-CCNC: 22 U/L (ref 12–78)
ANION GAP SERPL CALCULATED.3IONS-SCNC: 3 MMOL/L (ref 4–13)
AST SERPL W P-5'-P-CCNC: 19 U/L (ref 5–45)
BASOPHILS # BLD AUTO: 0.03 THOUSANDS/ΜL (ref 0–0.1)
BASOPHILS NFR BLD AUTO: 1 % (ref 0–1)
BILIRUB SERPL-MCNC: 0.25 MG/DL (ref 0.2–1)
BUN SERPL-MCNC: 9 MG/DL (ref 5–25)
CALCIUM SERPL-MCNC: 8.9 MG/DL (ref 8.3–10.1)
CHLORIDE SERPL-SCNC: 106 MMOL/L (ref 100–108)
CHOLEST SERPL-MCNC: 248 MG/DL (ref 50–200)
CO2 SERPL-SCNC: 29 MMOL/L (ref 21–32)
CREAT SERPL-MCNC: 0.64 MG/DL (ref 0.6–1.3)
CRP SERPL QL: <3 MG/L
EOSINOPHIL # BLD AUTO: 0.05 THOUSAND/ΜL (ref 0–0.61)
EOSINOPHIL NFR BLD AUTO: 1 % (ref 0–6)
ERYTHROCYTE [DISTWIDTH] IN BLOOD BY AUTOMATED COUNT: 12.7 % (ref 11.6–15.1)
GFR SERPL CREATININE-BSD FRML MDRD: 123 ML/MIN/1.73SQ M
GLUCOSE P FAST SERPL-MCNC: 91 MG/DL (ref 65–99)
HCT VFR BLD AUTO: 36.7 % (ref 34.8–46.1)
HDLC SERPL-MCNC: 54 MG/DL (ref 40–60)
HGB BLD-MCNC: 11.8 G/DL (ref 11.5–15.4)
IMM GRANULOCYTES # BLD AUTO: 0.01 THOUSAND/UL (ref 0–0.2)
IMM GRANULOCYTES NFR BLD AUTO: 0 % (ref 0–2)
LDLC SERPL CALC-MCNC: 166 MG/DL (ref 0–100)
LYMPHOCYTES # BLD AUTO: 2.46 THOUSANDS/ΜL (ref 0.6–4.47)
LYMPHOCYTES NFR BLD AUTO: 41 % (ref 14–44)
MCH RBC QN AUTO: 29 PG (ref 26.8–34.3)
MCHC RBC AUTO-ENTMCNC: 32.2 G/DL (ref 31.4–37.4)
MCV RBC AUTO: 90 FL (ref 82–98)
MONOCYTES # BLD AUTO: 0.59 THOUSAND/ΜL (ref 0.17–1.22)
MONOCYTES NFR BLD AUTO: 10 % (ref 4–12)
NEUTROPHILS # BLD AUTO: 2.89 THOUSANDS/ΜL (ref 1.85–7.62)
NEUTS SEG NFR BLD AUTO: 47 % (ref 43–75)
NONHDLC SERPL-MCNC: 194 MG/DL
NRBC BLD AUTO-RTO: 0 /100 WBCS
PLATELET # BLD AUTO: 272 THOUSANDS/UL (ref 149–390)
PMV BLD AUTO: 12 FL (ref 8.9–12.7)
POTASSIUM SERPL-SCNC: 4 MMOL/L (ref 3.5–5.3)
PROT SERPL-MCNC: 7.1 G/DL (ref 6.4–8.2)
RBC # BLD AUTO: 4.07 MILLION/UL (ref 3.81–5.12)
SODIUM SERPL-SCNC: 138 MMOL/L (ref 136–145)
TRIGL SERPL-MCNC: 142 MG/DL
WBC # BLD AUTO: 6.03 THOUSAND/UL (ref 4.31–10.16)

## 2019-02-06 PROCEDURE — 80061 LIPID PANEL: CPT

## 2019-02-06 PROCEDURE — 36415 COLL VENOUS BLD VENIPUNCTURE: CPT

## 2019-02-06 PROCEDURE — 80053 COMPREHEN METABOLIC PANEL: CPT

## 2019-02-06 PROCEDURE — 85025 COMPLETE CBC W/AUTO DIFF WBC: CPT

## 2019-02-06 PROCEDURE — 86140 C-REACTIVE PROTEIN: CPT

## 2019-02-13 ENCOUNTER — DOCUMENTATION (OUTPATIENT)
Dept: PSYCHIATRY | Facility: CLINIC | Age: 29
End: 2019-02-13

## 2019-02-25 ENCOUNTER — TELEPHONE (OUTPATIENT)
Dept: RHEUMATOLOGY | Facility: CLINIC | Age: 29
End: 2019-02-25

## 2019-02-25 ENCOUNTER — OFFICE VISIT (OUTPATIENT)
Dept: RHEUMATOLOGY | Facility: CLINIC | Age: 29
End: 2019-02-25
Payer: COMMERCIAL

## 2019-02-25 ENCOUNTER — APPOINTMENT (OUTPATIENT)
Dept: LAB | Facility: CLINIC | Age: 29
End: 2019-02-25
Payer: COMMERCIAL

## 2019-02-25 VITALS
SYSTOLIC BLOOD PRESSURE: 128 MMHG | HEART RATE: 70 BPM | BODY MASS INDEX: 30.49 KG/M2 | DIASTOLIC BLOOD PRESSURE: 82 MMHG | WEIGHT: 178.6 LBS | HEIGHT: 64 IN

## 2019-02-25 DIAGNOSIS — Z79.899 LONG TERM USE OF DRUG: ICD-10-CM

## 2019-02-25 DIAGNOSIS — M79.7 FIBROMYALGIA: ICD-10-CM

## 2019-02-25 DIAGNOSIS — M06.09 RHEUMATOID ARTHRITIS OF MULTIPLE SITES WITH NEGATIVE RHEUMATOID FACTOR (HCC): Primary | ICD-10-CM

## 2019-02-25 PROCEDURE — 99214 OFFICE O/P EST MOD 30 MIN: CPT | Performed by: INTERNAL MEDICINE

## 2019-02-25 PROCEDURE — 86480 TB TEST CELL IMMUN MEASURE: CPT

## 2019-02-25 PROCEDURE — 36415 COLL VENOUS BLD VENIPUNCTURE: CPT

## 2019-02-25 NOTE — PROGRESS NOTES
Assessment and Plan:   Patient is a 72-year-old female who presents for rheumatology follow-up regarding prior diagnosis of seronegative RA and fibromyalgia  Reviewing her chart she has been seen and evaluated by multiple rheumatologists and she has had various diagnoses over the years  It seems most recently her prior rheumatologist thought she had seronegative RA and so she has been on the Yohan An for about 1 year without any notable improvement in her pain  She requests to change therapies today since she felt much better on the Humira and is willing to do injections  My exam today revealed diffuse soft tissue tenderness with majority of fibromyalgia tender points present  There was no active swelling or joint synovitis on exam   I discussed with her that I think the main driving issue of her pain at this time is fibromyalgia rather than her RA, but she was adamant about switching therapies since she feels that her RA is active  We agree that she will stop the Yohan An and we will go back on the Humira, but I had discussed with her again that I am not sure this will actually improve her pain since she mainly has active fibromyalgia pain and there is no evidence of active joint synovitis  I also suggested since we need to address both issues that contribute to her pain, that she should talk to her psychiatrist about switching her Zoloft to Cymbalta to hopefully help with fibromyalgia pain  Another option would be to add amitriptyline at bedtime at a low dose  She sees her therapist tomorrow and will discuss this option  She will get updated tuberculosis screening and we will send for prior Auth for the Humira  She will get labs again about 1 week prior to seeing me in 4 months      Plan:  Diagnoses and all orders for this visit:    Rheumatoid arthritis of multiple sites with negative rheumatoid factor (HCC)  -     CBC and differential  -     Comprehensive metabolic panel  -     C-reactive protein  - Sedimentation rate, automated    Fibromyalgia    Long term use of drug  -     Quantiferon TB Gold Plus; Future  -     CBC and differential  -     Comprehensive metabolic panel  -     C-reactive protein  -     Sedimentation rate, automated        Follow-up plan: 4 months       Rheumatic Disease Summary  1  Prior dx of Ankylosing spondylitis vs seronegative RA  -Multiple prior rheumatologists including Kuldip Mathew, Dr Trevino [facility]), then est with Dr Marii Tay 7/2015 for previous dx of HLA-B27 negative AS, Sjogrens, and fibromyalgia; cousin with AS, mom with RA    -Lost to f/u with Marii Tay since 4/2016, then followed with [facility] rheum (did not think she had AS, possibly seroneg, thought mainly fibromyalgia), now returning to [facility] with me on 2/25/19  -Labs 7/2017: negative HLA-B27, RF, CCP, DORON, dsDNA, SSA, SSB, smith, RNP, C3/4, Hep B/C  -XR SI/lumbar 8/2016: mild facet arthropathy, normal SI with erosions or ankylosis   -XR hands/feet 6/2017: normal, no erosions   -MRI cervical spine 8/2017: normal   -Meds history:    -Multiple NSAIDs (nabumetone, meloxicam, naproxen, diclofenac, daypro, lodine)    -Sulfasalazine    -Oral MTX 15mg/week (2015-4/2016)    -Humira (9/2015-4/2016, effective but afraid needles)    Sarai Dean (2016, D/C due to vomiting and depression)    -Simchari Aria (2016, x6 months, then stopped working)    Ginger Rouse for ?seroneg RA ([facility]) (1/2018-present)  2  Fibromyalgia   -Lyrica beneficial   3  Drug toxicity monitoring   4  Comorbidities: migraines, small right supraspinatus tear, anxiety, depression     ZANE Sandoval is a 33 y o   female who presents for rheumatology follow-up regarding prior diagnoses of seronegative rheumatoid arthritis and fibromyalgia  She presents to the visit with her mother, who reports she has rheumatoid arthritis  She has not been seen in this practice since April 2016 at which time she was seen by Dr Marii Tay    She has seen multiple previous rheumatologists and I am her 6th rheumatologist   After leaving this practice in 2016 she started following at [facility] and was seen by Dr Nataly Flynn, who per chart review, felt she did not have ankylosing spondylitis but may have seronegative RA, and started [facility]  Patient reports she returns today for follow-up because she did not feel Dr Nataly Flynn had enough availability  She has been on the [place] for the past 1 year and states she does not think it has helped much  She reports joint pain started when she was around 22years old and mainly involved her lower back, hips, knees and ankles  Joint pain is now Mainly in hips, elbows, knees, and ankles  Joint swelling in fingers and knees, not present today  We reviewed her previous medications she has tried as above and she states that she felt the best on Humira and then the Simponi, although felt the Simponi did not last the full 4 weeks and only lasted for about 2 weeks until she felt worse again  She states she came off the Humira because she had needle phobia and was struggling to do her injections, although states that she when she was on it she did consistently do her injections  She states she definitely felt better on Humira and had less joint pain and stiffness  She has joint stiffness in the morning for only 10-15 minutes on a daily basis  She denies any significant back stiffness and mainly complains about the peripheral joints  She also states she knows she has fibromyalgia is on Lyrica for this which is managed through her primary care doctor  She also sees a psychiatrist and therapist for anxiety and depression, and is on Zoloft and buspirone  She has never been on Cymbalta, amitriptyline, or Savella  She previously was on gabapentin which did not help her    She states that she can tell the difference between rheumatoid arthritis pain and fibromyalgia pain and she strongly believes her rheumatoid arthritis pain is her main issue lately  She would like a change in therapy and no longer wants to be on the Demarco mawr  Even though she still has needle phobia she is willing to do injections because she felt so much better on the Humira  The following portions of the patient's history were reviewed and updated as appropriate: allergies, current medications, past family history, past medical history, past social history, past surgical history and problem list     Review of Systems:   Review of Systems   Constitutional: Negative for chills, fatigue, fever and unexpected weight change  HENT: Negative for mouth sores and trouble swallowing  Eyes: Negative for pain and visual disturbance  Respiratory: Negative for cough and shortness of breath  Cardiovascular: Negative for chest pain and leg swelling  Gastrointestinal: Negative for abdominal pain, blood in stool, constipation, diarrhea and nausea  Genitourinary: Negative for hematuria  Musculoskeletal: Positive for arthralgias, back pain and myalgias  Negative for joint swelling  Skin: Negative for rash  Neurological: Negative for weakness and numbness  Hematological: Negative for adenopathy  Psychiatric/Behavioral: Negative for sleep disturbance         Home Medications:    Current Outpatient Medications:     busPIRone (BUSPAR) 15 mg tablet, Take 1 tablet (15 mg total) by mouth 2 (two) times a day, Disp: 60 tablet, Rfl: 1    dicyclomine (BENTYL) 10 mg capsule, Take 1 capsule (10 mg total) by mouth 3 (three) times a day as needed (abdominal pain and diarrhea), Disp: 90 capsule, Rfl: 3    ergocalciferol (VITAMIN D2) 50,000 units, Take 50,000 Units by mouth once a week, Disp: , Rfl:     lamoTRIgine (LaMICtal) 100 mg tablet, Take 1 tablet (100 mg total) by mouth daily at bedtime, Disp: 90 tablet, Rfl: 1    levonorgestrel (MIRENA) 20 MCG/24HR IUD, 1 each by Intrauterine route once, Disp: , Rfl:     lidocaine (LIDODERM) 5 %, Place 2 patches on the skin daily Remove after 12 hours, Disp: 60 patch, Rfl: 5    prazosin (MINIPRESS) 1 mg capsule, Take 3 capsules (3 mg total) by mouth daily at bedtime, Disp: 90 capsule, Rfl: 3    pregabalin (LYRICA) 150 mg capsule, Take 1 capsule (150 mg total) by mouth 2 (two) times a day, Disp: 180 capsule, Rfl: 1    rizatriptan (MAXALT) 10 MG tablet, MAY REPEAT IN 2 HOURS IF NEEDED, Disp: 9 tablet, Rfl: 3    sertraline (ZOLOFT) 100 mg tablet, Take 2 tablets (200 mg total) by mouth daily, Disp: 60 tablet, Rfl: 3    XELJANZ XR 11 MG TB24, , Disp: , Rfl:     Objective:    Vitals:    02/25/19 0901   BP: 128/82   BP Location: Right arm   Patient Position: Sitting   Cuff Size: Standard   Pulse: 70   Weight: 81 kg (178 lb 9 6 oz)   Height: 5' 4" (1 626 m)       Physical Exam   Constitutional: She appears well-developed and well-nourished  She is cooperative  No distress  HENT:   Head: Normocephalic  Mouth/Throat: Oropharynx is clear and moist and mucous membranes are normal    Eyes: Conjunctivae and EOM are normal  No scleral icterus  Neck: No thyromegaly present  Cardiovascular: Normal rate, regular rhythm, S1 normal and S2 normal  Exam reveals no friction rub  No murmur heard  Pulmonary/Chest: Breath sounds normal  No respiratory distress  She has no wheezes  She has no rhonchi  She has no rales  Musculoskeletal:   Diffuse soft tissue tenderness with 18/18 fibromyalgia tender points present  Peripheral joints are generally mildly tender but there is no swelling or obvious synovitis on exam   There are no rheumatoid deformities  She has lower back pain in the lumbosacral region  Lymphadenopathy:     She has no cervical adenopathy  Neurological: She is alert  Skin: Skin is warm and dry  No rash noted  Nails show no clubbing  Psychiatric: She has a normal mood and affect       Labs:   Component      Latest Ref Rng & Units 2/6/2019   WBC      4 31 - 10 16 Thousand/uL 6 03   Red Blood Cell Count      3 81 - 5 12 Million/uL 4 07 Hemoglobin      11 5 - 15 4 g/dL 11 8   HCT      34 8 - 46 1 % 36 7   MCV      82 - 98 fL 90   MCH      26 8 - 34 3 pg 29 0   MCHC      31 4 - 37 4 g/dL 32 2   RDW      11 6 - 15 1 % 12 7   MPV      8 9 - 12 7 fL 12 0   Platelet Count      253 - 390 Thousands/uL 272   nRBC      /100 WBCs 0   Neutrophils %      43 - 75 % 47   Immat GRANS %      0 - 2 % 0   Lymphocytes Relative      14 - 44 % 41   Monocytes Relative      4 - 12 % 10   Eosinophils      0 - 6 % 1   Basophils Relative      0 - 1 % 1   Absolute Neutrophils      1 85 - 7 62 Thousands/µL 2 89   Immature Grans Absolute      0 00 - 0 20 Thousand/uL 0 01   Lymphocytes Absolute      0 60 - 4 47 Thousands/µL 2 46   Absolute Monocytes      0 17 - 1 22 Thousand/µL 0 59   Absolute Eosinophils      0 00 - 0 61 Thousand/µL 0 05   Basophils Absolute      0 00 - 0 10 Thousands/µL 0 03   Sodium      136 - 145 mmol/L 138   Potassium      3 5 - 5 3 mmol/L 4 0   Chloride      100 - 108 mmol/L 106   CO2      21 - 32 mmol/L 29   Anion Gap      4 - 13 mmol/L 3 (L)   BUN      5 - 25 mg/dL 9   Creatinine      0 60 - 1 30 mg/dL 0 64   GLUCOSE FASTING      65 - 99 mg/dL 91   Calcium      8 3 - 10 1 mg/dL 8 9   AST      5 - 45 U/L 19   ALT      12 - 78 U/L 22   Alkaline Phosphatase      46 - 116 U/L 39 (L)   Total Protein      6 4 - 8 2 g/dL 7 1   Albumin      3 5 - 5 0 g/dL 4 3   TOTAL BILIRUBIN      0 20 - 1 00 mg/dL 0 25   eGFR      ml/min/1 73sq m 123   C-REACTIVE PROTEIN      <3 0 mg/L <3 0

## 2019-02-25 NOTE — TELEPHONE ENCOUNTER
Please send PA for humira for RA  Previously was on humira with benefit  Tried/failed sulfasalazine, methotrexate, simponi, xeljanz

## 2019-02-26 ENCOUNTER — OFFICE VISIT (OUTPATIENT)
Dept: BEHAVIORAL/MENTAL HEALTH CLINIC | Facility: CLINIC | Age: 29
End: 2019-02-26
Payer: COMMERCIAL

## 2019-02-26 DIAGNOSIS — F41.1 GAD (GENERALIZED ANXIETY DISORDER): ICD-10-CM

## 2019-02-26 DIAGNOSIS — F43.10 PTSD (POST-TRAUMATIC STRESS DISORDER): ICD-10-CM

## 2019-02-26 DIAGNOSIS — Z72.89 SELF-INJURIOUS BEHAVIOR: ICD-10-CM

## 2019-02-26 DIAGNOSIS — F33.1 MODERATE EPISODE OF RECURRENT MAJOR DEPRESSIVE DISORDER (HCC): ICD-10-CM

## 2019-02-26 DIAGNOSIS — F50.9 EATING DISORDER: Primary | ICD-10-CM

## 2019-02-26 LAB
GAMMA INTERFERON BACKGROUND BLD IA-ACNC: 0.02 IU/ML
M TB IFN-G BLD-IMP: NEGATIVE
M TB IFN-G CD4+ BCKGRND COR BLD-ACNC: 0 IU/ML
M TB IFN-G CD4+ BCKGRND COR BLD-ACNC: 0 IU/ML
MITOGEN IGNF BCKGRD COR BLD-ACNC: >10 IU/ML

## 2019-02-26 PROCEDURE — 90834 PSYTX W PT 45 MINUTES: CPT | Performed by: SOCIAL WORKER
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
## 2019-02-26 NOTE — PSYCH
Psychotherapy Provided: Individual Psychotherapy 50 minutes     Length of time in session: 50 minutes, follow up in 2 week    Goals addressed in session: Goal 1, Goal 2 and Goal 3      Pain:      none    0    Current suicide risk : Low     D: Met with Catalina ALATORRE; 'I'm a little better now  I'm coasting right now '  Acknowledged she was not taking meds 'like I should be'  Stated would take, then skip a few days then take etc   This was going on for more than two weeks  'Had a big meltdown' regarding friend not inviting her to her super bowl party  Realized later her friend was aware it was her dad's birthday and she spends this with her mom  Dad's birthday discussed - this could have been a contributing factor towards medication issue  'Sometimes I just say I don't want to take meds anymore  I take so many for all my issues '  Denied purging behaviors since Valentines Day weekend  'My gums are bleeding'  This is scary and feels purging may be a factor  Has appointment with Dentist  Deion Majano digging nails or pulling hair  Denied nightmares but experiencing night sweats over last 2 months (not every night)    Stated memories of trauma have resurfaced- discussed  Denied SI       A: Tim Strange presented with depressed mood and constricted affect  Tim Strange will go through phases of not wanting to be on any meds anymore due to her multiple medical issues  Refraining from purging - demonstrating progress  P: Continue individual therapy, address above symptoms and trauma        Behavioral Health Treatment Plan [facility]: Diagnosis and Treatment Plan explained to Tanner Montaño relates understanding diagnosis and is agreeable to Treatment Plan   Yes

## 2019-02-28 NOTE — TELEPHONE ENCOUNTER
No prior authorization is required by insurance  [facility]   Please send there for dispensing

## 2019-02-28 NOTE — TELEPHONE ENCOUNTER
Please let her know this has been approved and sent to pharmacy  Also reinforce that she should be off the xeljanz, and not take this with the humira

## 2019-03-04 ENCOUNTER — OFFICE VISIT (OUTPATIENT)
Dept: PSYCHIATRY | Facility: CLINIC | Age: 29
End: 2019-03-04
Payer: COMMERCIAL

## 2019-03-04 VITALS
WEIGHT: 182 LBS | BODY MASS INDEX: 31.24 KG/M2 | SYSTOLIC BLOOD PRESSURE: 111 MMHG | DIASTOLIC BLOOD PRESSURE: 77 MMHG | HEART RATE: 77 BPM

## 2019-03-04 DIAGNOSIS — F50.9 EATING DISORDER: Primary | ICD-10-CM

## 2019-03-04 DIAGNOSIS — F33.1 MODERATE EPISODE OF RECURRENT MAJOR DEPRESSIVE DISORDER (HCC): ICD-10-CM

## 2019-03-04 DIAGNOSIS — F41.1 GAD (GENERALIZED ANXIETY DISORDER): ICD-10-CM

## 2019-03-04 DIAGNOSIS — F43.10 PTSD (POST-TRAUMATIC STRESS DISORDER): ICD-10-CM

## 2019-03-04 PROCEDURE — 99214 OFFICE O/P EST MOD 30 MIN: CPT | Performed by: PSYCHIATRY & NEUROLOGY

## 2019-03-04 RX ORDER — BUSPIRONE HYDROCHLORIDE 15 MG/1
15 TABLET ORAL 2 TIMES DAILY
Qty: 60 TABLET | Refills: 3 | Status: SHIPPED | OUTPATIENT
Start: 2019-03-04 | End: 2019-04-02

## 2019-03-04 RX ORDER — PRAZOSIN HYDROCHLORIDE 1 MG/1
3 CAPSULE ORAL
Qty: 90 CAPSULE | Refills: 3 | Status: SHIPPED | OUTPATIENT
Start: 2019-03-04 | End: 2019-07-25 | Stop reason: ALTCHOICE

## 2019-03-04 RX ORDER — DULOXETIN HYDROCHLORIDE 30 MG/1
CAPSULE, DELAYED RELEASE ORAL
Qty: 60 CAPSULE | Refills: 1 | Status: SHIPPED | OUTPATIENT
Start: 2019-03-04 | End: 2019-04-02 | Stop reason: SDUPTHER
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
## 2019-03-05 NOTE — PROGRESS NOTES
A user error has taken place: encounter opened in error, closed for administrative reasons  M[place]

## 2019-03-12 ENCOUNTER — TELEPHONE (OUTPATIENT)
Dept: NEUROLOGY | Facility: CLINIC | Age: 29
End: 2019-03-12

## 2019-03-12 NOTE — TELEPHONE ENCOUNTER
General 03/12/2019 12:05 PM Junito Santos care coordination  -   Note    Botox- no authorization needed  Please use [facility]         Thank you!

## 2019-03-12 NOTE — TELEPHONE ENCOUNTER
Spoke to The Leah Weathers from [address]  She stated she will process the referral and that it will take about a couple hours   Will call back in a couple hours to schedule delivery

## 2019-03-14 NOTE — TELEPHONE ENCOUNTER
Spoke to Aretha Baptiste from [email or web address]  She stated the referral is still under verification, will need to go thru [facility] and still waiting for patient to give consent   Estimated delivery at [facility] is 3/22/19

## 2019-03-15 NOTE — TELEPHONE ENCOUNTER
Spoke to [facility] from [employer], She stated the botox was ready and they called patient to get consent but they were unable to reach the patient  Patient has a $401 copay   She must call [facility] to give consent sent go over copay [phone]

## 2019-03-17 ENCOUNTER — TELEPHONE (OUTPATIENT)
Dept: OTHER | Facility: OTHER | Age: 29
End: 2019-03-17

## 2019-03-18 ENCOUNTER — OFFICE VISIT (OUTPATIENT)
Dept: INTERNAL MEDICINE CLINIC | Facility: CLINIC | Age: 29
End: 2019-03-18
Payer: COMMERCIAL

## 2019-03-18 VITALS
WEIGHT: 180 LBS | TEMPERATURE: 97.8 F | DIASTOLIC BLOOD PRESSURE: 68 MMHG | SYSTOLIC BLOOD PRESSURE: 108 MMHG | HEIGHT: 64 IN | BODY MASS INDEX: 30.73 KG/M2 | HEART RATE: 64 BPM | RESPIRATION RATE: 16 BRPM | OXYGEN SATURATION: 98 %

## 2019-03-18 DIAGNOSIS — R59.9 SWOLLEN GLAND: ICD-10-CM

## 2019-03-18 DIAGNOSIS — Z11.3 SCREEN FOR STD (SEXUALLY TRANSMITTED DISEASE): ICD-10-CM

## 2019-03-18 DIAGNOSIS — K14.6 TONGUE PAIN: Primary | ICD-10-CM

## 2019-03-18 DIAGNOSIS — Z79.899 LONG-TERM USE OF ADALIMUMAB: ICD-10-CM

## 2019-03-18 PROCEDURE — 99214 OFFICE O/P EST MOD 30 MIN: CPT | Performed by: INTERNAL MEDICINE

## 2019-03-18 RX ORDER — CLOTRIMAZOLE 10 MG/1
10 LOZENGE ORAL; TOPICAL
Qty: 70 TABLET | Refills: 0 | Status: SHIPPED | OUTPATIENT
Start: 2019-03-18 | End: 2019-04-04

## 2019-03-18 NOTE — PROGRESS NOTES
Assessment/Plan:     Diagnoses and all orders for this visit:    Tongue pain  Comments:  suspect thrush as cause of sx, clotrimazole selam ordered  f/u if not improved -> would recommend ENT Eval  Orders:  -     clotrimazole (MYCELEX) 10 mg selam; Take 1 tablet (10 mg total) by mouth 5 (five) times a day    Swollen gland  Comments:  & tender, likely reactive  monitor for resolution over next 7-10 days, if not improved -> ENT eval    Long-term use of adalimumab    Screen for STD (sexually transmitted disease)  Comments:  testing ordered, advised on false positives that can occur with nontreponemal tests  Orders:  -     RPR; Future          Subjective:      Patient ID: Rosio Reese is a 33 y o  female  HPI    Here for follow up and c/o painful tongue with white bumps for last couple of days  Also noticed swollen, tender LN in left neck for last 2-3 days  No fever, sore throat, cough but pt worried as father had cancer which was found via enlarged LN  Seeing psychiatrist for depression and eating disorder(bulimia with purging as recent as 3-4 weeks ago)  Also seeing rheum for arthritis and taking humira  Pt reports fibromyalgia improved with lyrica  doesn't use mouthwash regularly and no recent abx  Did have some gum bleeding after recent purging episodes  Pt looked up online that her tongue symptoms could be syphilis  She is monogamous in a relationship and no known STD exposures  Is looking into medical marijuana, I advised to discuss with her specialists  No other complaints  Past Medical History:   Diagnosis Date    [facility] ([facility])     Depression     Fibromyalgia     RA (rheumatoid arthritis) ([facility])      Vitals:    03/18/19 1024   BP: 108/68   Pulse: 64   Resp: 16   Temp: 97 8 °F (36 6 °C)   SpO2: 98%   Weight: 81 6 kg (180 lb)   Height: 5' 4" (1 626 m)     Body mass index is 30 9 kg/m²      Current Outpatient Medications:     Adalimumab (HUMIRA PEN) 40 MG/0 8ML PNKT, Inject 0 8 mL (40 mg total) under the skin every 14 (fourteen) days, Disp: 5 each, Rfl: 3    busPIRone (BUSPAR) 15 mg tablet, Take 1 tablet (15 mg total) by mouth 2 (two) times a day, Disp: 60 tablet, Rfl: 3    dicyclomine (BENTYL) 10 mg capsule, Take 1 capsule (10 mg total) by mouth 3 (three) times a day as needed (abdominal pain and diarrhea), Disp: 90 capsule, Rfl: 3    DULoxetine (CYMBALTA) 30 mg delayed release capsule, Take 30mg daily  After 1 week may increase to 60mg daily, Disp: 60 capsule, Rfl: 1    lamoTRIgine (LaMICtal) 100 mg tablet, Take 1 tablet (100 mg total) by mouth daily at bedtime, Disp: 90 tablet, Rfl: 1    levonorgestrel (MIRENA) 20 MCG/24HR IUD, 1 each by Intrauterine route once, Disp: , Rfl:     lidocaine (LIDODERM) 5 %, Place 2 patches on the skin daily Remove after 12 hours, Disp: 60 patch, Rfl: 5    prazosin (MINIPRESS) 1 mg capsule, Take 3 capsules (3 mg total) by mouth daily at bedtime, Disp: 90 capsule, Rfl: 3    pregabalin (LYRICA) 150 mg capsule, Take 1 capsule (150 mg total) by mouth 2 (two) times a day, Disp: 180 capsule, Rfl: 1    rizatriptan (MAXALT) 10 MG tablet, MAY REPEAT IN 2 HOURS IF NEEDED, Disp: 9 tablet, Rfl: 3    clotrimazole (MYCELEX) 10 mg selam, Take 1 tablet (10 mg total) by mouth 5 (five) times a day, Disp: 70 tablet, Rfl: 0  No Known Allergies      Review of Systems   Constitutional: Negative for fever  HENT: Positive for mouth sores  Negative for congestion  Eyes: Negative for visual disturbance  Respiratory: Negative for shortness of breath  Cardiovascular: Negative for chest pain  Gastrointestinal: Negative for abdominal pain  Genitourinary: Negative for dysuria  Musculoskeletal: Negative for arthralgias  Skin: Negative for rash  Neurological: Negative for headaches  Hematological: Positive for adenopathy  Psychiatric/Behavioral: Negative for dysphoric mood           Objective:      /68   Pulse 64   Temp 97 8 °F (36 6 °C) Resp 16   Ht 5' 4" (1 626 m)   Wt 81 6 kg (180 lb)   SpO2 98%   BMI 30 90 kg/m²          Physical Exam   Constitutional: She appears well-developed and well-nourished  HENT:   Head: Normocephalic and atraumatic  Mouth/Throat:       Cardiovascular: Normal rate, regular rhythm and normal heart sounds  No murmur heard  Pulmonary/Chest: Effort normal and breath sounds normal  She has no wheezes  She has no rales  Abdominal: Soft  Bowel sounds are normal  There is no tenderness  Musculoskeletal: She exhibits no edema  Lymphadenopathy:     She has cervical adenopathy  Right cervical: No superficial cervical adenopathy present  Left cervical: Superficial cervical (x1 & tender) adenopathy present  Neurological: She is alert  Psychiatric: She has a normal mood and affect  Her behavior is normal    Vitals reviewed

## 2019-03-18 NOTE — PATIENT INSTRUCTIONS
1  Clotrimazole sealm for 14 days  2  Monitor swollen gland for resolution  3   If symptoms do not improve -> call office for ENT referral

## 2019-03-18 NOTE — TELEPHONE ENCOUNTER
Spoke to Leslie Yarbrough from [employer]  Botox delivery is set up for Wednesday 3/20/19 at the [facility]   Please await for shipment

## 2019-03-18 NOTE — TELEPHONE ENCOUNTER
[facility] 1990  CONFIDENTIALTY NOTICE: This fax transmission is intended only for the addressee  It contains information that is legally privileged,  confidential or otherwise protected from use or disclosure  If you are not the intended recipient, you are strictly prohibited from reviewing,  disclosing, copying using or disseminating any of this information or taking any action in reliance on or regarding this information  If you have  received this fax in error, please notify us immediately by telephone so that we can arrange for its return to us  Page: 1 of 2  Call Id: [id number]  Health Call  Standard Call Report  Health Call  Patient Name: Brandon Quinones  Gender: Female  : 1990  Age: 33 Y 6 M 15 D  Return Phone  Number: [phone] (Home)  Address: [address]   City/State/[facility]: [place] [address]  Practice Name: [address] [place] [address] Charged:  Physician:  Venkat Zavala Name:  Relationship To  Patient: Self  Return Phone Number: [phone] (Home)  Presenting Problem: "I have bumps on the tip of my tongue  and they hurt "  Service Type: Triage  Charged Service 1: Risa Melton U  38  Name and  Number:  Nurse Assessment  Nurse: Garret Baez Date/Time: 3/17/2019 9:59:19 PM  Type of assessment required:  ---General (Adult or Child)  Duration of Current S/S  ---Over one week  Location/Radiation  ---Tongue  Temperature (F) and route:  ---Denies fever  Symptom Specific Meds (Dose/Time):  ---None  Other S/S  ---Noticed four white colored sores on tip of tongue  Pain has worsened today  Pain Scale on scale of 1-10, 10 being the worst:  ---4/10  Symptom progression:  ---same  Intake and Output  ---[employer]  LMP/ Pregnancy:  [facility] 1990  CONFIDENTIALTY NOTICE: This fax transmission is intended only for the addressee  It contains information that is legally privileged,  confidential or otherwise protected from use or disclosure   If you are not the intended recipient, you are strictly prohibited from reviewing,  disclosing, copying using or disseminating any of this information or taking any action in reliance on or regarding this information  If you have  received this fax in error, please notify us immediately by telephone so that we can arrange for its return to us  Page: 2 of 2  Call Id: [id number]  Nurse Assessment  ---LMP:  Breastfeeding  ---No  Last Exam/Treatment:  ---Seen in the office last in mid October  Protocols  Protocol Title Nurse Date/Time  Mouth Ulcers Logan Cervantes RN, Maurilio Copeland 3/17/2019 10:02:36 PM  Question Caller Affirmed  Disp  Time Disposition Final User  3/17/2019 10:10:30 PM See PCP When Office is Open (within 3  days)  Logan Cervantes RN, Heddie Faes  3/17/2019 10:10:46 PM RN Triaged Yes Logan Cervantes RN, [facility] Advice Given Per Protocol  SEE PCP WITHIN 3 DAYS: * You need to be seen within 2 or 3 days  Call your doctor during regular office hours and make an  appointment  An urgent care center is often the best source of care if your doctor's office is closed or you can't get an appointment  NOTE: If office will be open tomorrow, tell caller to call then, not in 3 days  MOM AND BENADRYL MIXTURE FOR PAIN: * Mix  equal amounts of Milk of Magnesia and Benadryl Allergy Liquid  * Every 4-6 hours swish a teaspoon (5 ml) of this mixture in your  mouth, and then spit out  PAIN MEDICINES: IBUPROFEN (E G , MOTRIN, ADVIL): * Take 400 mg (two 200 mg pills) by mouth  every 6 hours as needed  * Another choice is to take 600 mg (three 200 mg pills) by mouth every 8 hours as needed  * The most you  should take each day is 1,200 mg (six 200 mg pills a day), unless your doctor has told you to take more  CAUTION - NSAIDS (E G ,  IBUPROFEN, NAPROXEN): * Do not take nonsteroidal anti-inflammatory drugs (NSAIDs) if you have stomach problems, kidney  disease, heart failure, or other contraindications to using this type of medication   * Do not take NSAID medications for over 7 days  without consulting your PCP  * Do not take NSAID medications if you are pregnant  * You may take this medicine with or without  food  Taking it with food or milk may lessen the chance the drug will upset your stomach  * GASTROINTESTINAL RISK: There is an  increased risk of stomach ulcers, GI bleeding, perforation  * CARDIOVASCULAR RISK: There may be an increased risk of heart attack  and stroke  SOFT DIET: * Eat a soft diet  * Cold drinks, popsicles, and milk shakes are especially good  Avoid citrus fruits  DRINK  PLENTY LIQUIDS: * Drink plenty of liquids  This is important to prevent dehydration  * A healthy adult should drink 8 cups (240 ml)  or more of liquid each day  * How can you tell if you are drinking enough liquids? The goal is to keep the urine clear or light-yellow in  color  If your urine is bright yellow or dark yellow, you are probably not drinking enough liquids  * Caution: Some medical problems  require fluid restriction  CALL BACK IF: * You become worse  CARE ADVICE given per Mouth Ulcers (Adult) guideline    Caller Understands: Yes  Caller Disagree/Comply: Comply  PreDisposition: Unsure

## 2019-03-20 ENCOUNTER — TELEPHONE (OUTPATIENT)
Dept: INTERNAL MEDICINE CLINIC | Facility: CLINIC | Age: 29
End: 2019-03-20

## 2019-03-20 DIAGNOSIS — K06.8 BLEEDING GUMS: Primary | ICD-10-CM

## 2019-03-20 DIAGNOSIS — K92.1 BLOOD IN STOOL: ICD-10-CM

## 2019-03-20 NOTE — TELEPHONE ENCOUNTER
Patient notified  States she does not feel dizzy right now and does not have any bleeding currently  States she called to get BW ordered to see if she is anemic

## 2019-03-20 NOTE — TELEPHONE ENCOUNTER
botox arrived at [address] [place] [address]   botox documented and placed in the fridge

## 2019-03-20 NOTE — TELEPHONE ENCOUNTER
BW ordered and some add'l blood tests to check for clotting disorder    pls notify patient to complete today/ASAP    If feels dizzy or bleeding again, should go to ER    Thank you

## 2019-03-20 NOTE — TELEPHONE ENCOUNTER
PT  HAS RECTAL BLEEDING AND GUMS ARE BLEEDING A LOT  GUMS HAVE BEEN BLEEDING AT ANY TIME OF THE DAY AND STARTED 4 MOS  AGO  RECTAL BLEEDING STARTED ABOUT A MONTH AGO AFTER BM'S-BLOOD IN THE TOILET BOWL  HAPPENS AFTER EVERY BM  IS CONCERNED SHE MIGHT BE ANEMIC  IS LIGHTHEADED A LOT AND IF SHE MOVES SUDDENLY SEES DOTS

## 2019-03-22 ENCOUNTER — APPOINTMENT (OUTPATIENT)
Dept: LAB | Facility: MEDICAL CENTER | Age: 29
End: 2019-03-22
Payer: COMMERCIAL

## 2019-03-22 DIAGNOSIS — K92.1 BLOOD IN STOOL: ICD-10-CM

## 2019-03-22 DIAGNOSIS — Z11.3 SCREEN FOR STD (SEXUALLY TRANSMITTED DISEASE): ICD-10-CM

## 2019-03-22 DIAGNOSIS — K06.8 BLEEDING GUMS: ICD-10-CM

## 2019-03-22 LAB
APTT PPP: 32 SECONDS (ref 26–38)
BASOPHILS # BLD AUTO: 0.04 THOUSANDS/ΜL (ref 0–0.1)
BASOPHILS NFR BLD AUTO: 1 % (ref 0–1)
EOSINOPHIL # BLD AUTO: 0.05 THOUSAND/ΜL (ref 0–0.61)
EOSINOPHIL NFR BLD AUTO: 1 % (ref 0–6)
ERYTHROCYTE [DISTWIDTH] IN BLOOD BY AUTOMATED COUNT: 12.9 % (ref 11.6–15.1)
HCT VFR BLD AUTO: 37.6 % (ref 34.8–46.1)
HGB BLD-MCNC: 12.2 G/DL (ref 11.5–15.4)
IMM GRANULOCYTES # BLD AUTO: 0.01 THOUSAND/UL (ref 0–0.2)
IMM GRANULOCYTES NFR BLD AUTO: 0 % (ref 0–2)
INR PPP: 0.98 (ref 0.86–1.17)
LYMPHOCYTES # BLD AUTO: 1.99 THOUSANDS/ΜL (ref 0.6–4.47)
LYMPHOCYTES NFR BLD AUTO: 28 % (ref 14–44)
MCH RBC QN AUTO: 29 PG (ref 26.8–34.3)
MCHC RBC AUTO-ENTMCNC: 32.4 G/DL (ref 31.4–37.4)
MCV RBC AUTO: 89 FL (ref 82–98)
MONOCYTES # BLD AUTO: 0.72 THOUSAND/ΜL (ref 0.17–1.22)
MONOCYTES NFR BLD AUTO: 10 % (ref 4–12)
NEUTROPHILS # BLD AUTO: 4.3 THOUSANDS/ΜL (ref 1.85–7.62)
NEUTS SEG NFR BLD AUTO: 60 % (ref 43–75)
NRBC BLD AUTO-RTO: 0 /100 WBCS
PLATELET # BLD AUTO: 256 THOUSANDS/UL (ref 149–390)
PMV BLD AUTO: 12.1 FL (ref 8.9–12.7)
PROTHROMBIN TIME: 13.1 SECONDS (ref 11.8–14.2)
RBC # BLD AUTO: 4.21 MILLION/UL (ref 3.81–5.12)
WBC # BLD AUTO: 7.11 THOUSAND/UL (ref 4.31–10.16)

## 2019-03-22 PROCEDURE — 85610 PROTHROMBIN TIME: CPT

## 2019-03-22 PROCEDURE — 85025 COMPLETE CBC W/AUTO DIFF WBC: CPT

## 2019-03-22 PROCEDURE — 85730 THROMBOPLASTIN TIME PARTIAL: CPT

## 2019-03-22 PROCEDURE — 36415 COLL VENOUS BLD VENIPUNCTURE: CPT

## 2019-04-01 ENCOUNTER — PROCEDURE VISIT (OUTPATIENT)
Dept: NEUROLOGY | Facility: CLINIC | Age: 29
End: 2019-04-01
Payer: COMMERCIAL

## 2019-04-01 VITALS — SYSTOLIC BLOOD PRESSURE: 128 MMHG | DIASTOLIC BLOOD PRESSURE: 74 MMHG | TEMPERATURE: 98.6 F | HEART RATE: 82 BPM

## 2019-04-01 DIAGNOSIS — IMO0002 CHRONIC MIGRAINE: Primary | ICD-10-CM

## 2019-04-01 PROCEDURE — 64615 CHEMODENERV MUSC MIGRAINE: CPT | Performed by: PSYCHIATRY & NEUROLOGY

## 2019-04-02 ENCOUNTER — OFFICE VISIT (OUTPATIENT)
Dept: PSYCHIATRY | Facility: CLINIC | Age: 29
End: 2019-04-02
Payer: COMMERCIAL

## 2019-04-02 VITALS
SYSTOLIC BLOOD PRESSURE: 119 MMHG | RESPIRATION RATE: 16 BRPM | DIASTOLIC BLOOD PRESSURE: 81 MMHG | BODY MASS INDEX: 30.51 KG/M2 | HEART RATE: 72 BPM | HEIGHT: 64 IN | WEIGHT: 178.7 LBS

## 2019-04-02 DIAGNOSIS — F41.1 GAD (GENERALIZED ANXIETY DISORDER): ICD-10-CM

## 2019-04-02 DIAGNOSIS — F43.10 PTSD (POST-TRAUMATIC STRESS DISORDER): ICD-10-CM

## 2019-04-02 DIAGNOSIS — F33.1 MODERATE EPISODE OF RECURRENT MAJOR DEPRESSIVE DISORDER (HCC): ICD-10-CM

## 2019-04-02 PROCEDURE — 96127 BRIEF EMOTIONAL/BEHAV ASSMT: CPT | Performed by: NURSE PRACTITIONER

## 2019-04-02 PROCEDURE — 99214 OFFICE O/P EST MOD 30 MIN: CPT | Performed by: NURSE PRACTITIONER

## 2019-04-02 RX ORDER — DULOXETIN HYDROCHLORIDE 60 MG/1
60 CAPSULE, DELAYED RELEASE ORAL DAILY
Qty: 30 CAPSULE | Refills: 3 | Status: SHIPPED | OUTPATIENT
Start: 2019-04-02 | End: 2019-07-25 | Stop reason: ALTCHOICE

## 2019-04-02 RX ORDER — BUSPIRONE HYDROCHLORIDE 15 MG/1
TABLET ORAL
Qty: 90 TABLET | Refills: 3 | Status: SHIPPED | OUTPATIENT
Start: 2019-04-02 | End: 2019-07-25 | Stop reason: ALTCHOICE

## 2019-04-03 ENCOUNTER — TELEPHONE (OUTPATIENT)
Dept: OTHER | Facility: OTHER | Age: 29
End: 2019-04-03

## 2019-04-04 ENCOUNTER — TELEPHONE (OUTPATIENT)
Dept: INTERNAL MEDICINE CLINIC | Facility: CLINIC | Age: 29
End: 2019-04-04

## 2019-04-04 DIAGNOSIS — B37.0 ORAL THRUSH: Primary | ICD-10-CM

## 2019-04-04 DIAGNOSIS — K14.6 TONGUE PAIN: ICD-10-CM

## 2019-04-08 ENCOUNTER — TELEPHONE (OUTPATIENT)
Dept: GASTROENTEROLOGY | Facility: CLINIC | Age: 29
End: 2019-04-08

## 2019-04-08 ENCOUNTER — APPOINTMENT (OUTPATIENT)
Dept: LAB | Facility: AMBULARY SURGERY CENTER | Age: 29
End: 2019-04-08
Payer: COMMERCIAL

## 2019-04-08 ENCOUNTER — OFFICE VISIT (OUTPATIENT)
Dept: BEHAVIORAL/MENTAL HEALTH CLINIC | Facility: CLINIC | Age: 29
End: 2019-04-08
Payer: COMMERCIAL

## 2019-04-08 ENCOUNTER — OFFICE VISIT (OUTPATIENT)
Dept: GASTROENTEROLOGY | Facility: AMBULARY SURGERY CENTER | Age: 29
End: 2019-04-08
Payer: COMMERCIAL

## 2019-04-08 VITALS
RESPIRATION RATE: 14 BRPM | HEIGHT: 64 IN | WEIGHT: 182.6 LBS | TEMPERATURE: 98 F | DIASTOLIC BLOOD PRESSURE: 68 MMHG | HEART RATE: 62 BPM | BODY MASS INDEX: 31.18 KG/M2 | SYSTOLIC BLOOD PRESSURE: 110 MMHG

## 2019-04-08 DIAGNOSIS — K58.2 IRRITABLE BOWEL SYNDROME WITH BOTH CONSTIPATION AND DIARRHEA: ICD-10-CM

## 2019-04-08 DIAGNOSIS — F43.10 PTSD (POST-TRAUMATIC STRESS DISORDER): Primary | ICD-10-CM

## 2019-04-08 DIAGNOSIS — K62.5 RECTAL BLEEDING: ICD-10-CM

## 2019-04-08 DIAGNOSIS — Z72.89 SELF-INJURIOUS BEHAVIOR: ICD-10-CM

## 2019-04-08 DIAGNOSIS — F41.1 GAD (GENERALIZED ANXIETY DISORDER): ICD-10-CM

## 2019-04-08 DIAGNOSIS — F33.1 MODERATE EPISODE OF RECURRENT MAJOR DEPRESSIVE DISORDER (HCC): ICD-10-CM

## 2019-04-08 DIAGNOSIS — K62.5 RECTAL BLEEDING: Primary | ICD-10-CM

## 2019-04-08 DIAGNOSIS — K60.2 FISSURE, ANAL: Primary | ICD-10-CM

## 2019-04-08 LAB
BASOPHILS # BLD AUTO: 0.03 THOUSANDS/ΜL (ref 0–0.1)
BASOPHILS NFR BLD AUTO: 0 % (ref 0–1)
EOSINOPHIL # BLD AUTO: 0.09 THOUSAND/ΜL (ref 0–0.61)
EOSINOPHIL NFR BLD AUTO: 1 % (ref 0–6)
ERYTHROCYTE [DISTWIDTH] IN BLOOD BY AUTOMATED COUNT: 13.2 % (ref 11.6–15.1)
HCT VFR BLD AUTO: 38.9 % (ref 34.8–46.1)
HGB BLD-MCNC: 12.5 G/DL (ref 11.5–15.4)
IMM GRANULOCYTES # BLD AUTO: 0.02 THOUSAND/UL (ref 0–0.2)
IMM GRANULOCYTES NFR BLD AUTO: 0 % (ref 0–2)
LYMPHOCYTES # BLD AUTO: 1.88 THOUSANDS/ΜL (ref 0.6–4.47)
LYMPHOCYTES NFR BLD AUTO: 27 % (ref 14–44)
MCH RBC QN AUTO: 29.1 PG (ref 26.8–34.3)
MCHC RBC AUTO-ENTMCNC: 32.1 G/DL (ref 31.4–37.4)
MCV RBC AUTO: 91 FL (ref 82–98)
MONOCYTES # BLD AUTO: 0.69 THOUSAND/ΜL (ref 0.17–1.22)
MONOCYTES NFR BLD AUTO: 10 % (ref 4–12)
NEUTROPHILS # BLD AUTO: 4.29 THOUSANDS/ΜL (ref 1.85–7.62)
NEUTS SEG NFR BLD AUTO: 62 % (ref 43–75)
NRBC BLD AUTO-RTO: 0 /100 WBCS
PLATELET # BLD AUTO: 212 THOUSANDS/UL (ref 149–390)
PMV BLD AUTO: 12.5 FL (ref 8.9–12.7)
RBC # BLD AUTO: 4.3 MILLION/UL (ref 3.81–5.12)
WBC # BLD AUTO: 7 THOUSAND/UL (ref 4.31–10.16)

## 2019-04-08 PROCEDURE — 90834 PSYTX W PT 45 MINUTES: CPT | Performed by: SOCIAL WORKER

## 2019-04-08 PROCEDURE — 85025 COMPLETE CBC W/AUTO DIFF WBC: CPT

## 2019-04-08 PROCEDURE — 99214 OFFICE O/P EST MOD 30 MIN: CPT | Performed by: PHYSICIAN ASSISTANT

## 2019-04-08 PROCEDURE — 36415 COLL VENOUS BLD VENIPUNCTURE: CPT

## 2019-04-09 ENCOUNTER — TELEPHONE (OUTPATIENT)
Dept: GASTROENTEROLOGY | Facility: CLINIC | Age: 29
End: 2019-04-09

## 2019-04-16 ENCOUNTER — OFFICE VISIT (OUTPATIENT)
Dept: BEHAVIORAL/MENTAL HEALTH CLINIC | Facility: CLINIC | Age: 29
End: 2019-04-16
Payer: COMMERCIAL

## 2019-04-16 DIAGNOSIS — Z72.89 SELF-INJURIOUS BEHAVIOR: ICD-10-CM

## 2019-04-16 DIAGNOSIS — F33.1 MODERATE EPISODE OF RECURRENT MAJOR DEPRESSIVE DISORDER (HCC): ICD-10-CM

## 2019-04-16 DIAGNOSIS — F41.1 GAD (GENERALIZED ANXIETY DISORDER): ICD-10-CM

## 2019-04-16 DIAGNOSIS — F43.10 PTSD (POST-TRAUMATIC STRESS DISORDER): ICD-10-CM

## 2019-04-16 DIAGNOSIS — F50.9 EATING DISORDER, UNSPECIFIED TYPE: Primary | ICD-10-CM

## 2019-04-16 PROCEDURE — 90834 PSYTX W PT 45 MINUTES: CPT | Performed by: SOCIAL WORKER

## 2019-04-19 ENCOUNTER — TELEPHONE (OUTPATIENT)
Dept: GASTROENTEROLOGY | Facility: CLINIC | Age: 29
End: 2019-04-19

## 2019-04-30 DIAGNOSIS — R19.7 DIARRHEA, UNSPECIFIED TYPE: ICD-10-CM

## 2019-04-30 RX ORDER — DICYCLOMINE HYDROCHLORIDE 10 MG/1
10 CAPSULE ORAL 3 TIMES DAILY PRN
Qty: 90 CAPSULE | Refills: 5 | Status: SHIPPED | OUTPATIENT
Start: 2019-04-30 | End: 2020-04-06 | Stop reason: ALTCHOICE

## 2019-05-07 ENCOUNTER — OFFICE VISIT (OUTPATIENT)
Dept: BEHAVIORAL/MENTAL HEALTH CLINIC | Facility: CLINIC | Age: 29
End: 2019-05-07
Payer: COMMERCIAL

## 2019-05-07 DIAGNOSIS — F43.10 PTSD (POST-TRAUMATIC STRESS DISORDER): ICD-10-CM

## 2019-05-07 DIAGNOSIS — F41.1 GAD (GENERALIZED ANXIETY DISORDER): ICD-10-CM

## 2019-05-07 DIAGNOSIS — Z72.89 SELF-INJURIOUS BEHAVIOR: ICD-10-CM

## 2019-05-07 DIAGNOSIS — F33.1 MODERATE EPISODE OF RECURRENT MAJOR DEPRESSIVE DISORDER (HCC): ICD-10-CM

## 2019-05-07 DIAGNOSIS — F50.9 EATING DISORDER, UNSPECIFIED TYPE: Primary | ICD-10-CM

## 2019-05-07 PROCEDURE — 90834 PSYTX W PT 45 MINUTES: CPT | Performed by: SOCIAL WORKER

## 2019-05-21 ENCOUNTER — TELEPHONE (OUTPATIENT)
Dept: NEUROLOGY | Facility: CLINIC | Age: 29
End: 2019-05-21

## 2019-05-28 ENCOUNTER — TELEPHONE (OUTPATIENT)
Dept: GASTROENTEROLOGY | Facility: AMBULARY SURGERY CENTER | Age: 29
End: 2019-05-28

## 2019-05-28 ENCOUNTER — TELEPHONE (OUTPATIENT)
Dept: NEUROLOGY | Facility: CLINIC | Age: 29
End: 2019-05-28

## 2019-05-28 DIAGNOSIS — R11.2 NAUSEA AND VOMITING, INTRACTABILITY OF VOMITING NOT SPECIFIED, UNSPECIFIED VOMITING TYPE: Primary | ICD-10-CM

## 2019-05-28 DIAGNOSIS — R19.7 DIARRHEA OF PRESUMED INFECTIOUS ORIGIN: ICD-10-CM

## 2019-05-28 RX ORDER — ONDANSETRON 4 MG/1
4 TABLET, FILM COATED ORAL EVERY 8 HOURS PRN
Qty: 20 TABLET | Refills: 0 | Status: SHIPPED | OUTPATIENT
Start: 2019-05-28 | End: 2020-09-14

## 2019-05-30 ENCOUNTER — TRANSCRIBE ORDERS (OUTPATIENT)
Dept: ADMINISTRATIVE | Age: 29
End: 2019-05-30

## 2019-05-30 ENCOUNTER — HOSPITAL ENCOUNTER (OUTPATIENT)
Dept: RADIOLOGY | Age: 29
Discharge: HOME | End: 2019-05-30
Payer: COMMERCIAL

## 2019-05-30 DIAGNOSIS — R11.2 NAUSEA AND VOMITING, INTRACTABILITY OF VOMITING NOT SPECIFIED, UNSPECIFIED VOMITING TYPE: ICD-10-CM

## 2019-05-30 PROCEDURE — 76705 ECHO EXAM OF ABDOMEN: CPT

## 2019-05-31 ENCOUNTER — APPOINTMENT (OUTPATIENT)
Dept: LAB | Facility: MEDICAL CENTER | Age: 29
End: 2019-05-31
Payer: COMMERCIAL

## 2019-05-31 DIAGNOSIS — R19.7 DIARRHEA OF PRESUMED INFECTIOUS ORIGIN: ICD-10-CM

## 2019-05-31 LAB
C DIFF TOX GENS STL QL NAA+PROBE: NORMAL
CAMPYLOBACTER DNA SPEC NAA+PROBE: NORMAL
SALMONELLA DNA SPEC QL NAA+PROBE: NORMAL
SHIGA TOXIN STX GENE SPEC NAA+PROBE: NORMAL
SHIGELLA DNA SPEC QL NAA+PROBE: NORMAL

## 2019-05-31 PROCEDURE — 89055 LEUKOCYTE ASSESSMENT FECAL: CPT

## 2019-05-31 PROCEDURE — 87505 NFCT AGENT DETECTION GI: CPT

## 2019-06-03 ENCOUNTER — DOCUMENTATION (OUTPATIENT)
Dept: PSYCHIATRY | Facility: CLINIC | Age: 29
End: 2019-06-03

## 2019-06-04 LAB — WBC SPEC QL GRAM STN: NORMAL

## 2019-06-06 ENCOUNTER — TELEPHONE (OUTPATIENT)
Dept: GASTROENTEROLOGY | Facility: AMBULARY SURGERY CENTER | Age: 29
End: 2019-06-06

## 2019-06-11 ENCOUNTER — OFFICE VISIT (OUTPATIENT)
Dept: BEHAVIORAL/MENTAL HEALTH CLINIC | Facility: CLINIC | Age: 29
End: 2019-06-11
Payer: COMMERCIAL

## 2019-06-11 DIAGNOSIS — F50.9 EATING DISORDER, UNSPECIFIED TYPE: Primary | ICD-10-CM

## 2019-06-11 DIAGNOSIS — Z72.89 SELF-INJURIOUS BEHAVIOR: ICD-10-CM

## 2019-06-11 DIAGNOSIS — F33.1 MODERATE EPISODE OF RECURRENT MAJOR DEPRESSIVE DISORDER (HCC): ICD-10-CM

## 2019-06-11 DIAGNOSIS — F41.1 GAD (GENERALIZED ANXIETY DISORDER): ICD-10-CM

## 2019-06-11 DIAGNOSIS — F43.10 PTSD (POST-TRAUMATIC STRESS DISORDER): ICD-10-CM

## 2019-06-11 PROCEDURE — 90834 PSYTX W PT 45 MINUTES: CPT | Performed by: SOCIAL WORKER

## 2019-06-26 ENCOUNTER — TELEPHONE (OUTPATIENT)
Dept: NEUROLOGY | Facility: CLINIC | Age: 29
End: 2019-06-26

## 2019-07-18 ENCOUNTER — PROCEDURE VISIT (OUTPATIENT)
Dept: NEUROLOGY | Facility: CLINIC | Age: 29
End: 2019-07-18
Payer: OTHER MISCELLANEOUS

## 2019-07-18 VITALS — DIASTOLIC BLOOD PRESSURE: 71 MMHG | SYSTOLIC BLOOD PRESSURE: 105 MMHG | TEMPERATURE: 98 F | HEART RATE: 73 BPM

## 2019-07-18 DIAGNOSIS — G43.709 CHRONIC MIGRAINE WITHOUT AURA WITHOUT STATUS MIGRAINOSUS, NOT INTRACTABLE: Primary | ICD-10-CM

## 2019-07-18 PROCEDURE — 64615 CHEMODENERV MUSC MIGRAINE: CPT | Performed by: PHYSICIAN ASSISTANT

## 2019-07-18 NOTE — PROGRESS NOTES
Chemodenervation  Date/Time: 7/18/2019 3:15 PM  Performed by: Richard Olson PA-C  Authorized by: Richard Olson PA-C     Pre-procedure details:     Prepped With: Alcohol    Procedure details:     Position:  Upright  Botox:     Botox Type:  Type A    Brand:  Botox    mL's of Botulinum Toxin:  165    Final Concentration per CC:  50 units    Needle Gauge:  30 G 2 5 inch  Procedures:     Botox Procedures: chronic headache      Indications: migraines    Injection Location:     Head / Face:  L , R , L frontalis, R frontalis, R inferior cervical paraspinal, L inferior cervical paraspinal, L medial occipitalis, R medial occipitalis, L lateral occipitalis, R lateral occipitalis, procerus, L temporalis, R temporalis, R superior trapezius and L superior trapezius    L  injection amount:  5 unit(s)    R  injection amount:  5 unit(s)    L lateral frontalis:  5 unit(s)    R lateral frontalis:  5 unit(s)    L medial frontalis:  5 unit(s)    R medial frontalis:  5 unit(s)    L temporalis injection amount:  20 unit(s)    R temporalis injection amount:  20 unit(s)    Procerus injection amount:  5 unit(s)    L lateral occipitalis injection amount:  5 unit(s)    R lateral occipitalis injection amount:  5 unit(s)    L medial occipitalis injection amount:  10 unit(s)    R medial occipitalis injection amount:  10 unit(s)    L inferior cervical paraspinal injection amount:  10 unit(s)    R inferior cervical paraspinal injection amount:  10 unit(s)    L superior trapezius injection amount:  15 unit(s)    R superior trapezius injection amount:  15 unit(s)  Total Units:     Total units used:  165    Total units discarded:  35  Post-procedure details:     Chemodenervation:  Chronic migraine    Facial Nerve Location[de-identified]  Bilateral facial nerve    Patient tolerance of procedure: Tolerated well, no immediate complications  Comments:      5 units into each masseter muscle, medically necessary        So far, zero headaches since last seen  Since starting botox, the patient reports greater than 7 days of migraine relief from baseline, correlated with headache diary, decreased abortive medication use and decreased ER visits  4cc saline: 200 units botox used today  Next visit will use 2cc: 200 units botox      Vitals:    07/18/19 1459   BP: 105/71   Pulse: 73   Temp: 98 °F (36 7 °C)

## 2019-07-19 ENCOUNTER — SOCIAL WORK (OUTPATIENT)
Dept: BEHAVIORAL/MENTAL HEALTH CLINIC | Facility: CLINIC | Age: 29
End: 2019-07-19

## 2019-07-19 DIAGNOSIS — F41.1 GAD (GENERALIZED ANXIETY DISORDER): ICD-10-CM

## 2019-07-19 DIAGNOSIS — F33.1 MODERATE EPISODE OF RECURRENT MAJOR DEPRESSIVE DISORDER (HCC): ICD-10-CM

## 2019-07-19 DIAGNOSIS — F43.10 PTSD (POST-TRAUMATIC STRESS DISORDER): ICD-10-CM

## 2019-07-19 DIAGNOSIS — F50.9 EATING DISORDER, UNSPECIFIED TYPE: Primary | ICD-10-CM

## 2019-07-19 DIAGNOSIS — Z72.89 SELF-INJURIOUS BEHAVIOR: ICD-10-CM

## 2019-07-19 NOTE — PSYCH
Psychotherapy Provided: Individual Psychotherapy 50 minutes     Length of time in session: 50 minutes, follow up in 2 week    Goals addressed in session: Goal 1, Goal 2 and Goal 3      Pain:      none    0    Current suicide risk : Low     D: Met with Catalina ALATORRE; 'I have been a hot mess I think'  Session focused upon specific interactions with x-boyfriend Autumn Mendez; communicated in person as his father passed and she wanted to pay respects t the family as she was close to them while dating Rubbie Check  Autumn Mendez and Minal Campbell spoke again at [facility] - Minal Campbell thought this was for emotional support but Davide Andrea expressed anger for 'being a horrible person last 2 months of their relationship'  Minal Campbell processed her reactions today -felt she handled herself well that say  Further discussion of triggers of dad's  and illness  Minal Campbell stated she entered  home and began to 'ball'  She went outside and calmed down, came back in and met with family  Did not approach casket  Family was very happy to see her and treated her very well - this was a fear for Minal Campbell being 'the X'  Denied SI, experienced urges to purge after breakup with boyfriend (1 month) due to 'Teddy Stair' statements he was saying - this triggered recent responses from Rubscotte Check  She did not follow through  Discussed Mindfulness exercises and additional skills Minal Campbell has been practicing to replace negative emotions  Denied SI    A: Minal Campbell presented presented with appropriate mood and affect  She continues to experience judgements of circumstances involving above topics but demonstrated some thought stopping skills - transitioning to self care exercises - progress  P: Continue individual therapy, process residual thoughts/feleings of above topics since today's session  Behavioral Health Treatment Plan [facility]: Diagnosis and Treatment Plan explained to Janina Langley relates understanding diagnosis and is agreeable to Treatment Plan   Yes

## 2019-07-22 ENCOUNTER — OFFICE VISIT (OUTPATIENT)
Dept: INTERNAL MEDICINE CLINIC | Facility: CLINIC | Age: 29
End: 2019-07-22
Payer: COMMERCIAL

## 2019-07-22 ENCOUNTER — TELEPHONE (OUTPATIENT)
Dept: OTHER | Facility: OTHER | Age: 29
End: 2019-07-22

## 2019-07-22 ENCOUNTER — APPOINTMENT (OUTPATIENT)
Dept: LAB | Facility: MEDICAL CENTER | Age: 29
End: 2019-07-22
Payer: COMMERCIAL

## 2019-07-22 VITALS
RESPIRATION RATE: 18 BRPM | OXYGEN SATURATION: 98 % | HEART RATE: 100 BPM | SYSTOLIC BLOOD PRESSURE: 110 MMHG | TEMPERATURE: 99.7 F | HEIGHT: 64 IN | BODY MASS INDEX: 30.42 KG/M2 | WEIGHT: 178.2 LBS | DIASTOLIC BLOOD PRESSURE: 76 MMHG

## 2019-07-22 DIAGNOSIS — M06.09 RHEUMATOID ARTHRITIS OF MULTIPLE SITES WITH NEGATIVE RHEUMATOID FACTOR (HCC): ICD-10-CM

## 2019-07-22 DIAGNOSIS — J02.9 ACUTE PHARYNGITIS, UNSPECIFIED ETIOLOGY: Primary | ICD-10-CM

## 2019-07-22 LAB
ALBUMIN SERPL BCP-MCNC: 4.2 G/DL (ref 3.5–5)
ALP SERPL-CCNC: 65 U/L (ref 46–116)
ALT SERPL W P-5'-P-CCNC: 26 U/L (ref 12–78)
ANION GAP SERPL CALCULATED.3IONS-SCNC: 5 MMOL/L (ref 4–13)
AST SERPL W P-5'-P-CCNC: 16 U/L (ref 5–45)
BASOPHILS # BLD AUTO: 0.04 THOUSANDS/ΜL (ref 0–0.1)
BASOPHILS NFR BLD AUTO: 0 % (ref 0–1)
BILIRUB SERPL-MCNC: 0.53 MG/DL (ref 0.2–1)
BUN SERPL-MCNC: 7 MG/DL (ref 5–25)
CALCIUM SERPL-MCNC: 9.7 MG/DL (ref 8.3–10.1)
CHLORIDE SERPL-SCNC: 105 MMOL/L (ref 100–108)
CO2 SERPL-SCNC: 26 MMOL/L (ref 21–32)
CREAT SERPL-MCNC: 0.7 MG/DL (ref 0.6–1.3)
CRP SERPL QL: >90 MG/L
EOSINOPHIL # BLD AUTO: 0.03 THOUSAND/ΜL (ref 0–0.61)
EOSINOPHIL NFR BLD AUTO: 0 % (ref 0–6)
ERYTHROCYTE [DISTWIDTH] IN BLOOD BY AUTOMATED COUNT: 13 % (ref 11.6–15.1)
ERYTHROCYTE [SEDIMENTATION RATE] IN BLOOD: 25 MM/HOUR (ref 0–20)
GFR SERPL CREATININE-BSD FRML MDRD: 118 ML/MIN/1.73SQ M
GLUCOSE P FAST SERPL-MCNC: 107 MG/DL (ref 65–99)
HCT VFR BLD AUTO: 43.7 % (ref 34.8–46.1)
HGB BLD-MCNC: 13.9 G/DL (ref 11.5–15.4)
IMM GRANULOCYTES # BLD AUTO: 0.05 THOUSAND/UL (ref 0–0.2)
IMM GRANULOCYTES NFR BLD AUTO: 0 % (ref 0–2)
LYMPHOCYTES # BLD AUTO: 1.89 THOUSANDS/ΜL (ref 0.6–4.47)
LYMPHOCYTES NFR BLD AUTO: 14 % (ref 14–44)
MCH RBC QN AUTO: 28.1 PG (ref 26.8–34.3)
MCHC RBC AUTO-ENTMCNC: 31.8 G/DL (ref 31.4–37.4)
MCV RBC AUTO: 88 FL (ref 82–98)
MONOCYTES # BLD AUTO: 1.3 THOUSAND/ΜL (ref 0.17–1.22)
MONOCYTES NFR BLD AUTO: 10 % (ref 4–12)
NEUTROPHILS # BLD AUTO: 10.23 THOUSANDS/ΜL (ref 1.85–7.62)
NEUTS SEG NFR BLD AUTO: 76 % (ref 43–75)
NRBC BLD AUTO-RTO: 0 /100 WBCS
PLATELET # BLD AUTO: 243 THOUSANDS/UL (ref 149–390)
PMV BLD AUTO: 12.5 FL (ref 8.9–12.7)
POTASSIUM SERPL-SCNC: 4.3 MMOL/L (ref 3.5–5.3)
PROT SERPL-MCNC: 8.3 G/DL (ref 6.4–8.2)
RBC # BLD AUTO: 4.95 MILLION/UL (ref 3.81–5.12)
S PYO AG THROAT QL: NEGATIVE
SODIUM SERPL-SCNC: 136 MMOL/L (ref 136–145)
WBC # BLD AUTO: 13.54 THOUSAND/UL (ref 4.31–10.16)

## 2019-07-22 PROCEDURE — 86140 C-REACTIVE PROTEIN: CPT | Performed by: INTERNAL MEDICINE

## 2019-07-22 PROCEDURE — 87070 CULTURE OTHR SPECIMN AEROBIC: CPT | Performed by: NURSE PRACTITIONER

## 2019-07-22 PROCEDURE — 3008F BODY MASS INDEX DOCD: CPT | Performed by: NURSE PRACTITIONER

## 2019-07-22 PROCEDURE — 85652 RBC SED RATE AUTOMATED: CPT | Performed by: INTERNAL MEDICINE

## 2019-07-22 PROCEDURE — 86592 SYPHILIS TEST NON-TREP QUAL: CPT

## 2019-07-22 PROCEDURE — 36415 COLL VENOUS BLD VENIPUNCTURE: CPT | Performed by: INTERNAL MEDICINE

## 2019-07-22 PROCEDURE — 85025 COMPLETE CBC W/AUTO DIFF WBC: CPT | Performed by: INTERNAL MEDICINE

## 2019-07-22 PROCEDURE — 87880 STREP A ASSAY W/OPTIC: CPT | Performed by: NURSE PRACTITIONER

## 2019-07-22 PROCEDURE — 99213 OFFICE O/P EST LOW 20 MIN: CPT | Performed by: NURSE PRACTITIONER

## 2019-07-22 PROCEDURE — 80053 COMPREHEN METABOLIC PANEL: CPT | Performed by: INTERNAL MEDICINE

## 2019-07-22 RX ORDER — AMOXICILLIN 500 MG/1
500 TABLET, FILM COATED ORAL 2 TIMES DAILY
Qty: 20 TABLET | Refills: 0 | Status: SHIPPED | OUTPATIENT
Start: 2019-07-22 | End: 2019-08-01

## 2019-07-22 NOTE — PROGRESS NOTES
Assessment/Plan:    Rapid strep negative, throat culture pending  Suspect strep due to pharyngeal edema and exudate  Will treat with amoxicillin twice a day for 10 days  Recommend salt water gargles 3 times a day  Ibuprofen as needed for pain  Diagnoses and all orders for this visit:    Acute pharyngitis, unspecified etiology  -     POCT rapid strepA  -     Throat culture; Future  -     amoxicillin (AMOXIL) 500 MG tablet; Take 1 tablet (500 mg total) by mouth 2 (two) times a day for 10 days  -     Throat culture    Rheumatoid arthritis of multiple sites with negative rheumatoid factor (HCC)          Subjective:      Patient ID: Braden Sharpe is a 30 y o  female  Here for sore throat  Started 2 days ago   Low grade temp   Also has headache   Hurts to swallow   Denies any cough, nasal congestion/rhinorrhea       Currently on immunosuppressive therapy for RA       The following portions of the patient's history were reviewed and updated as appropriate: allergies, current medications, past family history, past medical history, past social history, past surgical history and problem list     Review of Systems   Constitutional: Positive for fatigue and fever  Negative for activity change  HENT: Positive for sore throat  Negative for congestion, ear pain, postnasal drip, rhinorrhea, sinus pressure and trouble swallowing  Eyes: Negative for visual disturbance  Respiratory: Negative for cough, chest tightness and shortness of breath  Cardiovascular: Negative for chest pain  Gastrointestinal: Negative for abdominal pain, nausea and vomiting  Musculoskeletal: Positive for myalgias  Neurological: Positive for headaches  Negative for dizziness and weakness           Objective:      /76   Pulse 100   Temp 99 7 °F (37 6 °C) (Oral)   Resp 18   Ht 5' 4" (1 626 m)   Wt 80 8 kg (178 lb 3 2 oz)   SpO2 98%   BMI 30 59 kg/m²          Physical Exam   Constitutional: She appears well-developed and well-nourished  HENT:   Right Ear: External ear normal    Left Ear: External ear normal    Mouth/Throat: Oropharyngeal exudate, posterior oropharyngeal edema and posterior oropharyngeal erythema present  Eyes: Pupils are equal, round, and reactive to light  Cardiovascular: Normal rate, regular rhythm and normal heart sounds  Pulmonary/Chest: Effort normal and breath sounds normal    Lymphadenopathy:        Head (right side): Tonsillar adenopathy present  Head (left side): Tonsillar adenopathy present  Neurological: She is alert  Psychiatric: She has a normal mood and affect  Her behavior is normal    Vitals reviewed

## 2019-07-22 NOTE — PATIENT INSTRUCTIONS
Start antibiotics , will call you with throat culture results   Elvi kilpatrick  Ibuprofen 400 mg three times a day for 2-3 days

## 2019-07-23 LAB — RPR SER QL: NORMAL

## 2019-07-24 LAB — BACTERIA THROAT CULT: NORMAL

## 2019-07-25 ENCOUNTER — OFFICE VISIT (OUTPATIENT)
Dept: RHEUMATOLOGY | Facility: CLINIC | Age: 29
End: 2019-07-25
Payer: COMMERCIAL

## 2019-07-25 VITALS
HEIGHT: 64 IN | WEIGHT: 178 LBS | DIASTOLIC BLOOD PRESSURE: 76 MMHG | SYSTOLIC BLOOD PRESSURE: 122 MMHG | BODY MASS INDEX: 30.39 KG/M2

## 2019-07-25 DIAGNOSIS — M06.09 RHEUMATOID ARTHRITIS OF MULTIPLE SITES WITH NEGATIVE RHEUMATOID FACTOR (HCC): Primary | ICD-10-CM

## 2019-07-25 DIAGNOSIS — M79.7 FIBROMYALGIA: ICD-10-CM

## 2019-07-25 DIAGNOSIS — F33.1 MODERATE EPISODE OF RECURRENT MAJOR DEPRESSIVE DISORDER (HCC): ICD-10-CM

## 2019-07-25 DIAGNOSIS — Z79.899 LONG TERM USE OF DRUG: ICD-10-CM

## 2019-07-25 DIAGNOSIS — F41.1 GAD (GENERALIZED ANXIETY DISORDER): ICD-10-CM

## 2019-07-25 DIAGNOSIS — F43.10 PTSD (POST-TRAUMATIC STRESS DISORDER): ICD-10-CM

## 2019-07-25 PROCEDURE — 99214 OFFICE O/P EST MOD 30 MIN: CPT | Performed by: INTERNAL MEDICINE

## 2019-07-25 NOTE — PROGRESS NOTES
Assessment and Plan:   Patient is a 52-year-old female who presents for rheumatology follow-up for seronegative RA and fibromyalgia  She reports a definite improvement after switching back to the Humira and overall is feeling better today  Her exam again today is unrevealing for any joint swelling or synovitis  The very high CRP in her blood work is noted however she was sick when she had these blood tests drawn and so I would likely attributes this to her illness as she also had a leukocytosis  We will plan to repeat these tests for her next visit  Her fibromyalgia pain is also overall improved and she is seemingly doing better also with her mental health despite deciding to come off her medications  We will continue with current therapy with the Humira and she will continue with Lyrica as well  Plan:  Diagnoses and all orders for this visit:    Rheumatoid arthritis of multiple sites with negative rheumatoid factor ([facility] [address] )    Fibromyalgia    Long term use of drug  -     C-reactive protein  -     Comprehensive metabolic panel  -     Sedimentation rate, automated  -     CBC and differential    PTSD (post-traumatic stress disorder)    SHIRLEY (generalized anxiety disorder)    Moderate episode of recurrent major depressive disorder (HCC)        Follow-up plan: 4 months with Yoly Herron PA-C      Rheumatic Disease Summary:  1   Prior dx of Ankylosing spondylitis vs seronegative RA  -Multiple prior rheumatologists including Marlen Durham, Dr Mireya May [facility]), then est with Dr Alexander Ceron 7/2015 for previous dx of HLA-B27 negative AS, Sjogrens, and fibromyalgia; cousin with AS, mom with RA    -Lost to f/u with Alexander Ceron since 4/2016, then followed with [facility] rheum (did not think she had AS, possibly seroneg, thought mainly fibromyalgia), now returning to [facility] with me on 2/25/19  -Labs 7/2017: negative HLA-B27, RF, CCP, DORON, dsDNA, SSA, SSB, smith, RNP, C3/4, Hep B/C  -XR SI/lumbar 8/2016: mild facet arthropathy, normal SI with erosions or ankylosis   -XR hands/feet 6/2017: normal, no erosions   -MRI cervical spine 8/2017: normal   -Meds history:    -Multiple NSAIDs (nabumetone, meloxicam, naproxen, diclofenac, daypro, lodine)    -Sulfasalazine    -Oral MTX 15mg/week (2015-4/2016)    -Humira (9/2015-4/2016, effective but afraid needles)    Garland Keita (2016, D/C due to vomiting and depression)    -Simcar[facility] Nina (2016, x6 months, then stopped working)    -Naoma Lizy for ?seroneg RA ([facility]) (1/2018-2/2019, ineffective)  -Visit 2/25/19: no active synovitis to suggest RA, however patient insistent on stopping xeljanz and going back on humira; exam most notable for fibromyalgia  -Humira started 5/2019 with improvement in generalized pain    2  Fibromyalgia   -Lyrica beneficial   -Suggested discussing with psych about switching to Cymbalta  3  Drug toxicity monitoring   4  Comorbidities: migraines, small right supraspinatus tear, anxiety, depression     ZANE  Lisa Garcia is a 30 y o   female who presents for rheumatology follow-up for seronegative RA and also has fibromyalgia  She reports that she has now been on the Humira injections more consistently over about the past almost 3 months  She believes she has had 5 injections  She does report improvement in her morning stiffness and with her general joint pain  She feels better on this compared to the Naoma Lizy  She also reports overall improvement in her myofascial fibromyalgia pain  She reports that after discussion with her psychiatrist she decided to come off majority of her medications including Cymbalta which I had suggested her trying for both her psych issues and the fibromyalgia  She reports that she is doing well from a psychiatric standpoint and overall her fibromyalgia pain is improved despite coming off some of her meds  She has remained on the Lyrica 150 mg b i d  She also was struggling with a recent upper respiratory infection possibly tonsillitis    She did have a throat swab which was negative for strep  Her labs were done right in the middle of her illness and so her CRP is very elevated along with a leukocytosis  The following portions of the patient's history were reviewed and updated as appropriate: allergies, current medications, past family history, past medical history, past social history, past surgical history and problem list     Review of Systems:   Review of Systems   Constitutional: Negative for chills, fatigue, fever and unexpected weight change  HENT: Negative for mouth sores and trouble swallowing  Eyes: Negative for pain and visual disturbance  Respiratory: Negative for cough and shortness of breath  Cardiovascular: Negative for chest pain and leg swelling  Gastrointestinal: Negative for abdominal pain, blood in stool, constipation, diarrhea and nausea  Genitourinary: Negative for hematuria  Musculoskeletal: Positive for arthralgias and myalgias  Negative for back pain and joint swelling  Skin: Negative for rash  Neurological: Negative for weakness and numbness  Hematological: Negative for adenopathy  Psychiatric/Behavioral: Negative for sleep disturbance         Home Medications:     Current Outpatient Medications:     Adalimumab (HUMIRA PEN) 40 MG/0 8ML PNKT, Inject 0 8 mL (40 mg total) under the skin every 14 (fourteen) days, Disp: 5 each, Rfl: 3    amoxicillin (AMOXIL) 500 MG tablet, Take 1 tablet (500 mg total) by mouth 2 (two) times a day for 10 days, Disp: 20 tablet, Rfl: 0    dicyclomine (BENTYL) 10 mg capsule, TAKE 1 CAPSULE (10 MG TOTAL) BY MOUTH 3 (THREE) TIMES A DAY AS NEEDED (ABDOMINAL PAIN AND DIARRHEA), Disp: 90 capsule, Rfl: 5    levonorgestrel (MIRENA) 20 MCG/24HR IUD, 1 each by Intrauterine route once, Disp: , Rfl:     ondansetron (ZOFRAN) 4 mg tablet, Take 1 tablet (4 mg total) by mouth every 8 (eight) hours as needed for nausea or vomiting, Disp: 20 tablet, Rfl: 0    pregabalin (LYRICA) 150 mg capsule, Take 1 capsule (150 mg total) by mouth 2 (two) times a day, Disp: 180 capsule, Rfl: 1    rizatriptan (MAXALT) 10 MG tablet, MAY REPEAT IN 2 HOURS IF NEEDED, Disp: 9 tablet, Rfl: 3    triamcinolone (KENALOG) 0 1 % oral topical paste, Apply 1 application topically 2 (two) times a day, Disp: 5 g, Rfl: 1    Objective:    Vitals:    07/25/19 1556   BP: 122/76   Weight: 80 7 kg (178 lb)   Height: 5' 4" (1 626 m)       Physical Exam   Constitutional: She appears well-developed and well-nourished  She is cooperative  No distress  HENT:   Head: Normocephalic and atraumatic  Mouth/Throat: Oropharynx is clear and moist and mucous membranes are normal    Eyes: Conjunctivae and EOM are normal  No scleral icterus  Neck: Neck supple  No spinous process tenderness and no muscular tenderness present  No thyromegaly present  Cardiovascular: Normal rate, regular rhythm, S1 normal and S2 normal  Exam reveals no friction rub  No murmur heard  Pulmonary/Chest: Breath sounds normal  No respiratory distress  She has no wheezes  She has no rhonchi  She has no rales  Musculoskeletal:   Improved generalized soft tissue tenderness   Lymphadenopathy:     She has no cervical adenopathy  Neurological: She is alert  No sensory deficit  Skin: Skin is warm and dry  No rash noted  Nails show no clubbing  Psychiatric: She has a normal mood and affect       Musculoskeletal--Peripheral Joint Exam:  Physical Exam     ANAYA-28 tender joint count: 0  ANAYA-28 swollen joint count: 0  ESR (mm/hr): 25  Patient global assessment: 35  ANAYA-28 score: 2 74 (Low Disease Activity)      Labs:   Component      Latest Ref Rng & Units 7/22/2019   WBC      4 31 - 10 16 Thousand/uL 13 54 (H)   Red Blood Cell Count      3 81 - 5 12 Million/uL 4 95   Hemoglobin      11 5 - 15 4 g/dL 13 9   HCT      34 8 - 46 1 % 43 7   MCV      82 - 98 fL 88   MCH      26 8 - 34 3 pg 28 1   MCHC      31 4 - 37 4 g/dL 31 8   RDW      11 6 - 15 1 % 13 0   MPV      8 9 - 12 7 fL 12 5   Platelet Count      526 - 390 Thousands/uL 243   nRBC      /100 WBCs 0   Neutrophils %      43 - 75 % 76 (H)   Immat GRANS %      0 - 2 % 0   Lymphocytes Relative      14 - 44 % 14   Monocytes Relative      4 - 12 % 10   Eosinophils      0 - 6 % 0   Basophils Relative      0 - 1 % 0   Absolute Neutrophils      1 85 - 7 62 Thousands/µL 10 23 (H)   Immature Grans Absolute      0 00 - 0 20 Thousand/uL 0 05   Lymphocytes Absolute      0 60 - 4 47 Thousands/µL 1 89   Absolute Monocytes      0 17 - 1 22 Thousand/µL 1 30 (H)   Absolute Eosinophils      0 00 - 0 61 Thousand/µL 0 03   Basophils Absolute      0 00 - 0 10 Thousands/µL 0 04   Sodium      136 - 145 mmol/L 136   Potassium      3 5 - 5 3 mmol/L 4 3   Chloride      100 - 108 mmol/L 105   CO2      21 - 32 mmol/L 26   Anion Gap      4 - 13 mmol/L 5   BUN      5 - 25 mg/dL 7   Creatinine      0 60 - 1 30 mg/dL 0 70   GLUCOSE FASTING      65 - 99 mg/dL 107 (H)   Calcium      8 3 - 10 1 mg/dL 9 7   AST      5 - 45 U/L 16   ALT      12 - 78 U/L 26   Alkaline Phosphatase      46 - 116 U/L 65   Total Protein      6 4 - 8 2 g/dL 8 3 (H)   Albumin      3 5 - 5 0 g/dL 4 2   TOTAL BILIRUBIN      0 20 - 1 00 mg/dL 0 53   eGFR      ml/min/1 73sq m 118   C-REACTIVE PROTEIN      <3 0 mg/L >90 0 (H)   Sed Rate      0 - 20 mm/hour 25 (H)

## 2019-07-30 ENCOUNTER — SOCIAL WORK (OUTPATIENT)
Dept: BEHAVIORAL/MENTAL HEALTH CLINIC | Facility: CLINIC | Age: 29
End: 2019-07-30
Payer: COMMERCIAL

## 2019-07-30 DIAGNOSIS — F43.10 PTSD (POST-TRAUMATIC STRESS DISORDER): ICD-10-CM

## 2019-07-30 DIAGNOSIS — Z72.89 SELF-INJURIOUS BEHAVIOR: ICD-10-CM

## 2019-07-30 DIAGNOSIS — F33.1 MODERATE EPISODE OF RECURRENT MAJOR DEPRESSIVE DISORDER (HCC): ICD-10-CM

## 2019-07-30 DIAGNOSIS — F41.1 GAD (GENERALIZED ANXIETY DISORDER): Primary | ICD-10-CM

## 2019-07-30 DIAGNOSIS — F50.9 EATING DISORDER, UNSPECIFIED TYPE: ICD-10-CM

## 2019-07-30 PROCEDURE — 90834 PSYTX W PT 45 MINUTES: CPT | Performed by: SOCIAL WORKER

## 2019-07-30 NOTE — PSYCH
Psychotherapy Provided: Individual Psychotherapy 50 minutes     Length of time in session: 50 minutes, follow up in 2 week    Goals addressed in session: Goal 1, Goal 2 and Goal 3      Pain:      none    0    Current suicide risk : Low     D: Met with Catalina individually  ROS; 'I have been getting myself into trouble'  Session focused upon specific emotional struggles resulting in acting out behaviors - Elisabeth Philip states she has experienced multiple sexual partners over the past 3 weeks  Feels the sex is 'mindless' and majority of time without protection due to 'forgetting'  Purged on one occasion, denied SIB  Started resuming the gym and eating better 'I'm walking around like a blob'  During processing of Catalina's core issues and possible reasons for above acting out behaviors,  It was found first 'real' boyfriend of 6 years Ferd was a big influence on how she feels about herself  Agreed to this being the focus of next session  Denied SI     A: Elisabeth Philip presented presented with appropriate mood and affect  She continues to experience judgements of circumstances involving above topics  Found to be about self worth, body image and feeling attractive to someone even if they don't have a relationship       P: Continue individual therapy, process residual thoughts/feleings of above topics since today's session  Behavioral Health Treatment Plan [facility]: Diagnosis and Treatment Plan explained to Sirena Levy relates understanding diagnosis and is agreeable to Treatment Plan   Yes

## 2019-08-12 ENCOUNTER — SOCIAL WORK (OUTPATIENT)
Dept: BEHAVIORAL/MENTAL HEALTH CLINIC | Facility: CLINIC | Age: 29
End: 2019-08-12
Payer: COMMERCIAL

## 2019-08-12 DIAGNOSIS — Z72.89 SELF-INJURIOUS BEHAVIOR: ICD-10-CM

## 2019-08-12 DIAGNOSIS — F43.10 PTSD (POST-TRAUMATIC STRESS DISORDER): ICD-10-CM

## 2019-08-12 DIAGNOSIS — F33.1 MODERATE EPISODE OF RECURRENT MAJOR DEPRESSIVE DISORDER (HCC): ICD-10-CM

## 2019-08-12 DIAGNOSIS — F41.1 GAD (GENERALIZED ANXIETY DISORDER): Primary | ICD-10-CM

## 2019-08-12 PROCEDURE — 90834 PSYTX W PT 45 MINUTES: CPT | Performed by: SOCIAL WORKER

## 2019-08-12 NOTE — PSYCH
Answered pt call light. Pt requesting tylenol. 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  Compassion but assertiveness discussed  Yusef stated she has been putting ' way too much' pressure on herself regarding healthy lifestyle - eating right most days - when a day occurs that is not good purging occurs (2 times since last visit)  Going to the gym but overdoing it - Arthritis flared up  Changed to every other day and this has been better  Denied SI    A: Yusef presented with increased anxiety, increased rate of speech  Has become more safe with exercise and eating - progress  P: Continue individual therapy, purchase protein bars and drinks to use as breakfast instead of abstaining  Behavioral Health Treatment Plan [facility]: Diagnosis and Treatment Plan explained to Janina Langley relates understanding diagnosis and is agreeable to Treatment Plan   Yes

## 2019-08-13 DIAGNOSIS — M79.7 FIBROMYALGIA: ICD-10-CM

## 2019-08-13 RX ORDER — PREGABALIN 150 MG/1
150 CAPSULE ORAL 2 TIMES DAILY
Qty: 180 CAPSULE | Refills: 0 | Status: SHIPPED | OUTPATIENT
Start: 2019-08-13 | End: 2019-12-04 | Stop reason: SDUPTHER

## 2019-08-27 ENCOUNTER — TELEPHONE (OUTPATIENT)
Dept: PSYCHIATRY | Facility: CLINIC | Age: 29
End: 2019-08-27

## 2019-08-27 NOTE — TELEPHONE ENCOUNTER
Received a message from [facility] stating that she would like to go back on the medication she was previously on  I called back and asked her to call to schedule an appointment (last saw her in March) and if she could be a little more specific on which medication   Will let you know when she calls back

## 2019-09-12 ENCOUNTER — TELEPHONE (OUTPATIENT)
Dept: BEHAVIORAL/MENTAL HEALTH CLINIC | Facility: CLINIC | Age: 29
End: 2019-09-12

## 2019-09-12 NOTE — TELEPHONE ENCOUNTER
Returned a call from [facility] who was in need for support  Student of hers had a seizure and this triggered her father's death by his body movements  Support provided

## 2019-09-13 ENCOUNTER — OFFICE VISIT (OUTPATIENT)
Dept: INTERNAL MEDICINE CLINIC | Facility: CLINIC | Age: 29
End: 2019-09-13
Payer: COMMERCIAL

## 2019-09-13 VITALS
WEIGHT: 183.6 LBS | HEART RATE: 77 BPM | BODY MASS INDEX: 31.34 KG/M2 | RESPIRATION RATE: 18 BRPM | TEMPERATURE: 99.1 F | SYSTOLIC BLOOD PRESSURE: 122 MMHG | DIASTOLIC BLOOD PRESSURE: 80 MMHG | OXYGEN SATURATION: 98 % | HEIGHT: 64 IN

## 2019-09-13 DIAGNOSIS — R23.4 PEELING SKIN: ICD-10-CM

## 2019-09-13 DIAGNOSIS — F41.1 GAD (GENERALIZED ANXIETY DISORDER): ICD-10-CM

## 2019-09-13 DIAGNOSIS — F33.1 MODERATE EPISODE OF RECURRENT MAJOR DEPRESSIVE DISORDER (HCC): Primary | ICD-10-CM

## 2019-09-13 PROCEDURE — 99213 OFFICE O/P EST LOW 20 MIN: CPT | Performed by: NURSE PRACTITIONER

## 2019-09-13 PROCEDURE — 3008F BODY MASS INDEX DOCD: CPT | Performed by: NURSE PRACTITIONER

## 2019-09-13 RX ORDER — BUSPIRONE HYDROCHLORIDE 7.5 MG/1
7.5 TABLET ORAL 2 TIMES DAILY
Qty: 180 EACH | Refills: 0 | Status: SHIPPED | OUTPATIENT
Start: 2019-09-13 | End: 2020-04-06 | Stop reason: ALTCHOICE

## 2019-09-13 RX ORDER — DULOXETIN HYDROCHLORIDE 30 MG/1
CAPSULE, DELAYED RELEASE ORAL
Qty: 60 CAPSULE | Refills: 0 | Status: SHIPPED | OUTPATIENT
Start: 2019-09-13 | End: 2019-10-08 | Stop reason: SDUPTHER

## 2019-09-13 NOTE — PROGRESS NOTES
Assessment/Plan: Moderate episode of recurrent major depressive disorder (HCC)  Start cymbalta 1 capsule daily for 1 weeks then increase to 2 capsules daily   Continue counseling twice a month or more often if needed   If not improving with current medication, recommend scheduling follow up with psychiatry, previously seen by Dr Aviva Dias  SHIRLEY (generalized anxiety disorder)  Start cymbalta as prescribed  Buspar twice daily  Continue counseling  RTO in 1 month and if not improving, recommend following up with psychiatry, Dr Aviva Dias  Diagnoses and all orders for this visit:    Moderate episode of recurrent major depressive disorder (HCC)  -     DULoxetine (CYMBALTA) 30 mg delayed release capsule; Take 1 capsule daily for 1 week then increase to 2 capsules daily  -     busPIRone (BUSPAR) 7 5 mg tablet; Take 1 tablet (7 5 mg total) by mouth 2 (two) times a day    SHIRLEY (generalized anxiety disorder)  -     DULoxetine (CYMBALTA) 30 mg delayed release capsule; Take 1 capsule daily for 1 week then increase to 2 capsules daily  -     busPIRone (BUSPAR) 7 5 mg tablet; Take 1 tablet (7 5 mg total) by mouth 2 (two) times a day    Peeling skin  Comments:  avoid scented soaps/lotions  cera ve twice daily   referral given for dermatology   Orders:  -     Ambulatory referral to Dermatology; Future          Subjective:       Patient ID: Jessica Woodall is a 30 y o  female      Here today with anxiety   She has a history of anxiety and was most recently on medication up until April 2019  She decided to stop because she was feeling well  Recently she feels her anxiety is increasing due to increased stressors at home  Racing thoughts, panic attacks, decreased concentration, and difficulty sleeping   She was seeing psychiatry but stopped since she stopped the medication   She has been on many trials of medication in the past but most recently was on cymbalta and buspar which were helping her  She also sees a therapist about twice a month which she finds helpful  She denies suicidal thoughts     Also complaining today of skin peeling on her fingers  This has been ongoing for over a year  She denies new lotions or soaps  Denies itching but they do burn from time to time              The following portions of the patient's history were reviewed and updated as appropriate: allergies, current medications, past family history, past medical history, past social history, past surgical history and problem list     Review of Systems   Constitutional: Negative for activity change, fatigue, fever and unexpected weight change  Respiratory: Negative for shortness of breath  Cardiovascular: Negative for chest pain  Gastrointestinal: Negative for abdominal pain  Musculoskeletal: Positive for myalgias  Skin: Positive for rash  Neurological: Negative for dizziness, weakness, light-headedness and headaches  Psychiatric/Behavioral: Positive for decreased concentration and sleep disturbance  Negative for dysphoric mood, self-injury and suicidal ideas  The patient is nervous/anxious  Objective:      /80   Pulse 77   Temp 99 1 °F (37 3 °C) (Oral)   Resp 18   Ht 5' 4" (1 626 m)   Wt 83 3 kg (183 lb 9 6 oz)   SpO2 98%   BMI 31 51 kg/m²          Physical Exam   Constitutional: She is oriented to person, place, and time  She appears well-developed and well-nourished  HENT:   Mouth/Throat: Oropharynx is clear and moist    Eyes: Pupils are equal, round, and reactive to light  Neck: No thyromegaly present  Cardiovascular: Normal rate, regular rhythm and normal heart sounds  Pulmonary/Chest: Effort normal and breath sounds normal    Neurological: She is alert and oriented to person, place, and time  Skin: Skin is warm and dry  Skin peeling on both forefingers   Psychiatric: She exhibits a depressed mood  Vitals reviewed

## 2019-09-13 NOTE — ASSESSMENT & PLAN NOTE
Start cymbalta 1 capsule daily for 1 weeks then increase to 2 capsules daily   Continue counseling twice a month or more often if needed   If not improving with current medication, recommend scheduling follow up with psychiatry, previously seen by Dr Ty Holman

## 2019-09-13 NOTE — PATIENT INSTRUCTIONS
Take cymbalta 1 capsule daily for 1 week then increase to 2 capsules daily  buspar 1 tablet twice a day  Continue with therapist twice a month or more if needed  Call me with any problems    Apply cera ve cream to hands daily

## 2019-09-13 NOTE — ASSESSMENT & PLAN NOTE
Start cymbalta as prescribed  Buspar twice daily  Continue counseling  RTO in 1 month and if not improving, recommend following up with psychiatry,   [facility]

## 2019-09-30 ENCOUNTER — TRANSCRIBE ORDERS (OUTPATIENT)
Dept: LAB | Facility: CLINIC | Age: 29
End: 2019-09-30

## 2019-09-30 ENCOUNTER — LAB (OUTPATIENT)
Dept: LAB | Facility: CLINIC | Age: 29
End: 2019-09-30
Payer: COMMERCIAL

## 2019-09-30 ENCOUNTER — ANNUAL EXAM (OUTPATIENT)
Dept: OBGYN CLINIC | Facility: CLINIC | Age: 29
End: 2019-09-30
Payer: COMMERCIAL

## 2019-09-30 VITALS
DIASTOLIC BLOOD PRESSURE: 70 MMHG | HEIGHT: 64 IN | BODY MASS INDEX: 30.73 KG/M2 | WEIGHT: 180 LBS | SYSTOLIC BLOOD PRESSURE: 120 MMHG

## 2019-09-30 DIAGNOSIS — Z11.3 SCREENING FOR STDS (SEXUALLY TRANSMITTED DISEASES): ICD-10-CM

## 2019-09-30 DIAGNOSIS — Z12.4 SCREENING FOR MALIGNANT NEOPLASM OF CERVIX: ICD-10-CM

## 2019-09-30 DIAGNOSIS — Z01.419 ENCOUNTER FOR WELL WOMAN EXAM WITH ROUTINE GYNECOLOGICAL EXAM: Primary | ICD-10-CM

## 2019-09-30 LAB — HBV SURFACE AG SER QL: NORMAL

## 2019-09-30 PROCEDURE — 87340 HEPATITIS B SURFACE AG IA: CPT

## 2019-09-30 PROCEDURE — 87491 CHLMYD TRACH DNA AMP PROBE: CPT | Performed by: OBSTETRICS & GYNECOLOGY

## 2019-09-30 PROCEDURE — 36415 COLL VENOUS BLD VENIPUNCTURE: CPT | Performed by: OBSTETRICS & GYNECOLOGY

## 2019-09-30 PROCEDURE — 87389 HIV-1 AG W/HIV-1&-2 AB AG IA: CPT | Performed by: OBSTETRICS & GYNECOLOGY

## 2019-09-30 PROCEDURE — S0612 ANNUAL GYNECOLOGICAL EXAMINA: HCPCS | Performed by: OBSTETRICS & GYNECOLOGY

## 2019-09-30 PROCEDURE — 86592 SYPHILIS TEST NON-TREP QUAL: CPT

## 2019-09-30 PROCEDURE — G0145 SCR C/V CYTO,THINLAYER,RESCR: HCPCS | Performed by: OBSTETRICS & GYNECOLOGY

## 2019-09-30 PROCEDURE — 87591 N.GONORRHOEAE DNA AMP PROB: CPT | Performed by: OBSTETRICS & GYNECOLOGY
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
## 2019-10-01 LAB — RPR SER QL: NORMAL

## 2019-10-02 LAB — HIV 1+2 AB+HIV1 P24 AG SERPL QL IA: NORMAL

## 2019-10-03 ENCOUNTER — TELEPHONE (OUTPATIENT)
Dept: NEUROLOGY | Facility: CLINIC | Age: 29
End: 2019-10-03

## 2019-10-03 LAB
C TRACH DNA SPEC QL NAA+PROBE: NEGATIVE
N GONORRHOEA DNA SPEC QL NAA+PROBE: NEGATIVE

## 2019-10-03 NOTE — TELEPHONE ENCOUNTER
Type Date User Summary Attachment   General 10/03/2019  9:14 AM Linnea Evans care coordination  -   Note    Botox- no authorization needed   Please use [facility]      Thank you,     Jeanette Morris

## 2019-10-03 NOTE — TELEPHONE ENCOUNTER
[facility] and spoke with Berhane  Initiated a refill for patient Botox  Patient Botox 100 units quantity of 2 is set to be delivered to [facility] location 10/16/19  signature required  Please let me or Rocco Cope know when Botox arrives  Thank you

## 2019-10-04 ENCOUNTER — TELEPHONE (OUTPATIENT)
Dept: NEUROLOGY | Facility: CLINIC | Age: 29
End: 2019-10-04

## 2019-10-04 NOTE — TELEPHONE ENCOUNTER
Called patient- left a detailed message for the patient  I made her aware her Botox is covered and we will be getting this shipped via the specialty pharmacy  If the patient has any other questions she should call our office

## 2019-10-04 NOTE — TELEPHONE ENCOUNTER
Received a voicemail from the patient stating she is calling to make sure her Botox is covered for her upcoming appointment  Patient states she has "[employer] Rx ID#: [id number]  She states if she does not answer I can leave a detailed voicemail

## 2019-10-07 ENCOUNTER — TELEPHONE (OUTPATIENT)
Dept: GASTROENTEROLOGY | Facility: CLINIC | Age: 29
End: 2019-10-07

## 2019-10-07 DIAGNOSIS — K60.2 FISSURE, ANAL: Primary | ICD-10-CM

## 2019-10-07 DIAGNOSIS — K62.5 RECTAL BLEEDING: ICD-10-CM

## 2019-10-07 LAB
LAB AP GYN PRIMARY INTERPRETATION: NORMAL
Lab: NORMAL
PATH INTERP SPEC-IMP: NORMAL

## 2019-10-07 NOTE — TELEPHONE ENCOUNTER
Dr Mayco Sierra patient hx  Anal fissure, rectal bleeding, IBS with constipation and diarrhea    Patient calling with complaints of rectal bleeding  She sees a small amount of bright red blood in toilet with BMs since yesterday  She states she had the same symptoms when she previously had an anal fissure  Denies nausea, vomiting, fever chills, lightheaded/dizziness  Patient requesting hydrocortisone-pramoxine (analpram) rectal cream, stated she was prescribed this in the past and it worked well

## 2019-10-07 NOTE — TELEPHONE ENCOUNTER
Patients GI provider:  Dr Chevy Amaya    Number to return call: [phone]    Reason for call: Pt calling stating she is having rectal bleeding and wanted to know if she can get a script for the suppositories  she had before       Scheduled procedure/appointment date if applicable: NA

## 2019-10-08 DIAGNOSIS — F33.1 MODERATE EPISODE OF RECURRENT MAJOR DEPRESSIVE DISORDER (HCC): ICD-10-CM

## 2019-10-08 DIAGNOSIS — F41.1 GAD (GENERALIZED ANXIETY DISORDER): ICD-10-CM

## 2019-10-08 RX ORDER — DULOXETIN HYDROCHLORIDE 30 MG/1
CAPSULE, DELAYED RELEASE ORAL
Qty: 60 CAPSULE | Refills: 0 | Status: SHIPPED | OUTPATIENT
Start: 2019-10-08 | End: 2019-11-29 | Stop reason: SDUPTHER

## 2019-10-13 NOTE — RESULT ENCOUNTER NOTE
STD labs reviewed  GC/Chlamydia cultures negative  Hep B, HIV, and RPR non-reactive  Pap smear -- normal cytology  Per ASCCP guidelines repeat pap smear in 3 years

## 2019-10-24 ENCOUNTER — PROCEDURE VISIT (OUTPATIENT)
Dept: NEUROLOGY | Facility: CLINIC | Age: 29
End: 2019-10-24
Payer: COMMERCIAL

## 2019-10-24 VITALS
WEIGHT: 175 LBS | HEIGHT: 64 IN | SYSTOLIC BLOOD PRESSURE: 117 MMHG | DIASTOLIC BLOOD PRESSURE: 82 MMHG | BODY MASS INDEX: 29.88 KG/M2 | TEMPERATURE: 98.3 F | HEART RATE: 82 BPM

## 2019-10-24 DIAGNOSIS — G43.709 CHRONIC MIGRAINE WITHOUT AURA WITHOUT STATUS MIGRAINOSUS, NOT INTRACTABLE: Primary | ICD-10-CM

## 2019-10-24 PROCEDURE — 64615 CHEMODENERV MUSC MIGRAINE: CPT | Performed by: PHYSICIAN ASSISTANT

## 2019-10-24 NOTE — PROGRESS NOTES
Chemodenervation  Date/Time: 10/24/2019 3:38 PM  Performed by: Toby Carroll PA-C  Authorized by: Toby Carroll PA-C     Pre-procedure details:     Prepped With: Alcohol    Procedure details:     Position:  Upright  Botox:     Botox Type:  Type A    Brand:  Botox    mL's of Botulinum Toxin:  200    Final Concentration per CC:  100 units    Needle Gauge:  30 G 2 5 inch  Procedures:     Botox Procedures: chronic headache      Indications: migraines    Injection Location:     Head / Face:  L , R , L frontalis, R frontalis, R inferior cervical paraspinal, L inferior cervical paraspinal, L medial occipitalis, R medial occipitalis, L lateral occipitalis, R lateral occipitalis, procerus, L temporalis, R temporalis, R superior trapezius and L superior trapezius    L  injection amount:  5 unit(s)    R  injection amount:  5 unit(s)    L lateral frontalis:  5 unit(s)    R lateral frontalis:  5 unit(s)    L medial frontalis:  5 unit(s)    R medial frontalis:  5 unit(s)    L temporalis injection amount:  20 unit(s)    R temporalis injection amount:  20 unit(s)    Procerus injection amount:  5 unit(s)    L lateral occipitalis injection amount:  5 unit(s)    R lateral occipitalis injection amount:  5 unit(s)    L medial occipitalis injection amount:  10 unit(s)    R medial occipitalis injection amount:  10 unit(s)    L inferior cervical paraspinal injection amount:  10 unit(s)    R inferior cervical paraspinal injection amount:  10 unit(s)    L superior trapezius injection amount:  15 unit(s)    R superior trapezius injection amount:  15 unit(s)  Total Units:     Total units used:  200    Total units discarded:  0  Post-procedure details:     Chemodenervation:  Chronic migraine    Facial Nerve Location[de-identified]  Bilateral facial nerve    Patient tolerance of procedure:   Tolerated well, no immediate complications  Comments:      Extra 35 units in the scalp, and 5 units in each masseter muscle (10 units total), all medically necessary  2cc saline: 200 units botox      Vitals:    10/24/19 1548   BP: 117/82   Pulse: 82   Temp: 98 3 °F (36 8 °C)

## 2019-11-05 ENCOUNTER — SOCIAL WORK (OUTPATIENT)
Dept: BEHAVIORAL/MENTAL HEALTH CLINIC | Facility: CLINIC | Age: 29
End: 2019-11-05
Payer: COMMERCIAL

## 2019-11-05 DIAGNOSIS — F33.1 MODERATE EPISODE OF RECURRENT MAJOR DEPRESSIVE DISORDER (HCC): ICD-10-CM

## 2019-11-05 DIAGNOSIS — Z72.89 SELF-INJURIOUS BEHAVIOR: ICD-10-CM

## 2019-11-05 DIAGNOSIS — F50.9 EATING DISORDER, UNSPECIFIED TYPE: ICD-10-CM

## 2019-11-05 DIAGNOSIS — F41.1 GAD (GENERALIZED ANXIETY DISORDER): Primary | ICD-10-CM

## 2019-11-05 DIAGNOSIS — F43.10 PTSD (POST-TRAUMATIC STRESS DISORDER): ICD-10-CM

## 2019-11-05 PROCEDURE — 90834 PSYTX W PT 45 MINUTES: CPT | Performed by: SOCIAL WORKER

## 2019-11-05 NOTE — PSYCH
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 'pretty well'  Also has a boyfriend for past 2 months  'Has her doubts' as they have some different opinions regarding future goals  Waited this time to tell him about her history and trust obstacles  Disclosed one obstacle of purging last week  'Punished self' for rest of the night and following day with refusing to eat  No other incidents since last session  Denied SIB   'I have had some urges to bite but never did'  Resumed gym  'I need more energy '  'I never give myself credit - am I doing a good job or my principal thinks I suck '  Exercising correctly  Reassessment of treatment plan completed  Denied SI     A: Magen Gaston presented with appropriate mood and affect  Some deflection noted as defense mechanism  Easily redirectable  Self esteem remains very black and white  Progress with trying to see the grey but more practice/work needed  Cognizant of expectation she places on herself  P: Continue individual therapy, limited sessions due to times needed for sessions  Behavioral Health Treatment Plan [facility]: Diagnosis and Treatment Plan explained to Cheryl Owen relates understanding diagnosis and is agreeable to Treatment Plan   Yes
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
## 2019-11-06 NOTE — PSYCH
"  1.  Reason for the visit:  Consult to discuss insomnia  2.  [facility] provider and [facility]  [facility]  3.  Previous Sleep Doctor or Pulmonlogist (clinic name)?  None noted  4.  Records, Procedures, Imaging, and Labs (see below)  No records to obtain        All NOTES from previous office visits that pertain to why they are being seen in the Sleep Center    Previous Sleep Studies, Chest CT, Echos and reports that pertain to why they are seeing Sleep Center    All Sleep records that have been done in the last 2 years that pertain to why they are seeing Sleep Center            Are they being seen for continuation of care for Cpap/Bipap/Avap/Trilogy/Dental Device? none    If yes to above Who and Where was Device issued/currently getting supplies from? na    Are you currently on \"Supplemental Oxygen\" during the day or night?   na                                                                                                                                                      Please remind pt to bring Cpap machine and ask to arrive 15 minutes early to appointment due traffic and congestion                                                 5. Pt Sleep Center Packet received Message left asking pt to arrive 30 minutes early to appointment if no packet received.        Yes: \"please make sure that you bring this to your appointment completed, either the doctor will not see you until this completed or you may be asked to reschedule your appointment.\"     No: mail or email to the pt and explain, \"please make sure that you bring this to your appointment completed, either the doctor will not see you until this completed or you may be asked to reschedule your appointment.\"     ~If pt coming early to fill packet out, ask that they come 30 minutes prior to their appointment~     6. Has the pt's medication list been updated and preferred pharmacy added?     7. Has the allergy list been " Treatment Plan Tracking    # 7Treatment Plan not completed within required time limits due to: Client cancelled/no-showed scheduled appointment  Netta Duncan "reviewed?    \"Thank you for choosing [facility] and we look forward to seeing you at your upcoming appointment\"     "

## 2019-11-22 ENCOUNTER — APPOINTMENT (OUTPATIENT)
Dept: LAB | Facility: MEDICAL CENTER | Age: 29
End: 2019-11-22
Payer: COMMERCIAL

## 2019-11-22 LAB
ALBUMIN SERPL BCP-MCNC: 4.1 G/DL (ref 3.5–5)
ALP SERPL-CCNC: 41 U/L (ref 46–116)
ALT SERPL W P-5'-P-CCNC: 27 U/L (ref 12–78)
ANION GAP SERPL CALCULATED.3IONS-SCNC: 3 MMOL/L (ref 4–13)
AST SERPL W P-5'-P-CCNC: 19 U/L (ref 5–45)
BASOPHILS # BLD AUTO: 0.05 THOUSANDS/ΜL (ref 0–0.1)
BASOPHILS NFR BLD AUTO: 1 % (ref 0–1)
BILIRUB SERPL-MCNC: 0.33 MG/DL (ref 0.2–1)
BUN SERPL-MCNC: 8 MG/DL (ref 5–25)
CALCIUM SERPL-MCNC: 9.2 MG/DL (ref 8.3–10.1)
CHLORIDE SERPL-SCNC: 108 MMOL/L (ref 100–108)
CO2 SERPL-SCNC: 28 MMOL/L (ref 21–32)
CREAT SERPL-MCNC: 0.68 MG/DL (ref 0.6–1.3)
CRP SERPL QL: <3 MG/L
EOSINOPHIL # BLD AUTO: 0.06 THOUSAND/ΜL (ref 0–0.61)
EOSINOPHIL NFR BLD AUTO: 1 % (ref 0–6)
ERYTHROCYTE [DISTWIDTH] IN BLOOD BY AUTOMATED COUNT: 13.1 % (ref 11.6–15.1)
ERYTHROCYTE [SEDIMENTATION RATE] IN BLOOD: 9 MM/HOUR (ref 0–20)
GFR SERPL CREATININE-BSD FRML MDRD: 119 ML/MIN/1.73SQ M
GLUCOSE P FAST SERPL-MCNC: 90 MG/DL (ref 65–99)
HCT VFR BLD AUTO: 43.6 % (ref 34.8–46.1)
HGB BLD-MCNC: 13.8 G/DL (ref 11.5–15.4)
IMM GRANULOCYTES # BLD AUTO: 0.01 THOUSAND/UL (ref 0–0.2)
IMM GRANULOCYTES NFR BLD AUTO: 0 % (ref 0–2)
LYMPHOCYTES # BLD AUTO: 2.59 THOUSANDS/ΜL (ref 0.6–4.47)
LYMPHOCYTES NFR BLD AUTO: 38 % (ref 14–44)
MCH RBC QN AUTO: 28.3 PG (ref 26.8–34.3)
MCHC RBC AUTO-ENTMCNC: 31.7 G/DL (ref 31.4–37.4)
MCV RBC AUTO: 90 FL (ref 82–98)
MONOCYTES # BLD AUTO: 0.65 THOUSAND/ΜL (ref 0.17–1.22)
MONOCYTES NFR BLD AUTO: 10 % (ref 4–12)
NEUTROPHILS # BLD AUTO: 3.45 THOUSANDS/ΜL (ref 1.85–7.62)
NEUTS SEG NFR BLD AUTO: 50 % (ref 43–75)
NRBC BLD AUTO-RTO: 0 /100 WBCS
PLATELET # BLD AUTO: 257 THOUSANDS/UL (ref 149–390)
PMV BLD AUTO: 13 FL (ref 8.9–12.7)
POTASSIUM SERPL-SCNC: 4.2 MMOL/L (ref 3.5–5.3)
PROT SERPL-MCNC: 7.3 G/DL (ref 6.4–8.2)
RBC # BLD AUTO: 4.87 MILLION/UL (ref 3.81–5.12)
SODIUM SERPL-SCNC: 139 MMOL/L (ref 136–145)
WBC # BLD AUTO: 6.81 THOUSAND/UL (ref 4.31–10.16)

## 2019-11-22 PROCEDURE — 80053 COMPREHEN METABOLIC PANEL: CPT | Performed by: INTERNAL MEDICINE

## 2019-11-22 PROCEDURE — 85025 COMPLETE CBC W/AUTO DIFF WBC: CPT | Performed by: INTERNAL MEDICINE

## 2019-11-22 PROCEDURE — 36415 COLL VENOUS BLD VENIPUNCTURE: CPT | Performed by: INTERNAL MEDICINE

## 2019-11-22 PROCEDURE — 86140 C-REACTIVE PROTEIN: CPT | Performed by: INTERNAL MEDICINE

## 2019-11-22 PROCEDURE — 85652 RBC SED RATE AUTOMATED: CPT | Performed by: INTERNAL MEDICINE

## 2019-11-29 DIAGNOSIS — F33.1 MODERATE EPISODE OF RECURRENT MAJOR DEPRESSIVE DISORDER (HCC): ICD-10-CM

## 2019-11-29 DIAGNOSIS — F41.1 GAD (GENERALIZED ANXIETY DISORDER): ICD-10-CM

## 2019-11-29 RX ORDER — DULOXETIN HYDROCHLORIDE 30 MG/1
CAPSULE, DELAYED RELEASE ORAL
Qty: 60 CAPSULE | Refills: 0 | Status: SHIPPED | OUTPATIENT
Start: 2019-11-29 | End: 2020-04-06 | Stop reason: ALTCHOICE

## 2019-12-02 NOTE — PROGRESS NOTES
Assessment and Plan: This is a 36-year-old female presenting today for follow-up for seronegative rheumatoid arthritis vs ankylosing spondylitis currently utilizing Humira  The patient states that overall she has been feeling well although does have discomfort primarily in her low back into her hips and knee joints bilaterally  She notes occasional swelling of her hands, knees, and ankles however there is no evidence of synovitis or joint swelling on exam today  From an inflammatory standpoint, she appears to be stable with the use of Humira  She also currently utilizes Lyrica through her primary care physician for her fibromyalgia  She is not currently utilizing any over-the-counter medications such as Tylenol or NSAID therapy however she was advised that if her joint pain does become more severe, she may utilize these kinds of medications for symptomatic relief  Her most recent labs were stable and she was advised to return to the office for re-evaluation in 6 months but contact the office in the interim if she has any further questions or concerns          Plan:  Diagnoses and all orders for this visit:    Rheumatoid arthritis of multiple sites with negative rheumatoid factor ([facility] [address] )    Fibromyalgia    Long-term use of immunosuppressant medication        Follow-up plan: F/U in 6 months with Dr Earnestine Ashley       Rheumatic Disease Summary:  1  Prior dx of Ankylosing spondylitis vs seronegative RA  -Multiple prior rheumatologists including Marek Cervantes, Dr Alejandar Kahn [facility]), then est with Dr Ricardo Swain 7/2015 for previous dx of HLA-B27 negative AS, Sjogrens, and fibromyalgia; cousin with AS, mom with RA    -Lost to f/u with Ricardo Swain since 4/2016, then followed with [facility] rheum (did not think she had AS, possibly seroneg, thought mainly fibromyalgia), now returning to [facility] with me on 2/25/19  -Labs 7/2017: negative HLA-B27, RF, CCP, DORON, dsDNA, SSA, SSB, smith, RNP, C3/4, Hep B/C  -XR SI/lumbar 8/2016: mild facet arthropathy, normal SI with erosions or ankylosis   -XR hands/feet 6/2017: normal, no erosions   -MRI cervical spine 8/2017: normal   -Meds history:    -Multiple NSAIDs (nabumetone, meloxicam, naproxen, diclofenac, daypro, lodine)    -Sulfasalazine    -Oral MTX 15mg/week (2015-4/2016)    -Humira (9/2015-4/2016, effective but afraid needles)    -Otezla (2016, D/C due to vomiting and depression)    -Simponi Aria (2016, x6 months, then stopped working)    -Xeljanz for ?seroneg RA ([facility]) (1/2018-2/2019, ineffective)  -Visit 2/25/19: no active synovitis to suggest RA, however patient insistent on stopping xeljanz and going back on humira; exam most notable for fibromyalgia  -Humira started 5/2019 with improvement in generalized pain    2  Fibromyalgia   -Lyrica beneficial   -Suggested discussing with psych about switching to Cymbalta  3  Drug toxicity monitoring   4  Comorbidities: migraines, small right supraspinatus tear, anxiety, depression              Riri Raines is a 30 y o   female who presents today for follow up for prior diagnosis of AS vs seronegative RA  Low back and hip pain into the knee joints, and legs give out intermittently x 1 month  Also with ankle discomfort  Improvement with Humira  Occasional swelling in the hands, knees, and ankles  No other obvious joint swelling  +Am stiffness x 10 minutes  +Difficulty with sleep due to night sweats which is fairly new    +fatigue  Also with Lyrica through PCP  The following portions of the patient's history were reviewed and updated as appropriate: allergies, current medications, past family history, past medical history, past social history, past surgical history and problem list     Review of Systems:   Review of Systems   Constitutional: Positive for fatigue  Negative for appetite change, chills, fever and unexpected weight change  HENT: Negative for congestion, mouth sores and sore throat           No recent infxn    Eyes: Negative for pain, redness and visual disturbance  +dry eyes  No dry mouth    Respiratory: Negative for cough, chest tightness and shortness of breath  Cardiovascular: Negative for chest pain and leg swelling  Gastrointestinal: Positive for constipation, diarrhea and nausea  Negative for abdominal pain, blood in stool and vomiting  Endocrine: Negative for polydipsia and polyuria  Genitourinary: Negative for frequency and hematuria  Skin: Negative for color change and rash  No rashes, no alopecia, or no nail changes  No raynauds  Neurological: Negative for weakness, light-headedness, numbness and headaches  Hematological: Negative for adenopathy  Psychiatric/Behavioral: Negative for behavioral problems  The patient is not nervous/anxious  Home Medications:     Current Outpatient Medications:     Adalimumab (HUMIRA PEN) 40 MG/0 8ML PNKT, Inject 0 8 mL (40 mg total) under the skin every 14 (fourteen) days, Disp: 5 each, Rfl: 3    busPIRone (BUSPAR) 7 5 mg tablet, Take 1 tablet (7 5 mg total) by mouth 2 (two) times a day, Disp: 180 each, Rfl: 0    dicyclomine (BENTYL) 10 mg capsule, TAKE 1 CAPSULE (10 MG TOTAL) BY MOUTH 3 (THREE) TIMES A DAY AS NEEDED (ABDOMINAL PAIN AND DIARRHEA) (Patient not taking: Reported on 10/24/2019), Disp: 90 capsule, Rfl: 5    DULoxetine (CYMBALTA) 30 mg delayed release capsule, Take 2 capsules daily, Disp: 60 capsule, Rfl: 0    hydrocortisone-pramoxine (ANALPRAM-HC) 2 5-1 % rectal cream, Insert into the rectum 2 times per day for 2 weeks   (Patient not taking: Reported on 10/24/2019), Disp: 30 g, Rfl: 0    levonorgestrel (MIRENA) 20 MCG/24HR IUD, 1 each by Intrauterine route once, Disp: , Rfl:     ondansetron (ZOFRAN) 4 mg tablet, Take 1 tablet (4 mg total) by mouth every 8 (eight) hours as needed for nausea or vomiting (Patient not taking: Reported on 10/24/2019), Disp: 20 tablet, Rfl: 0    pregabalin (LYRICA) 150 mg capsule, Take 1 capsule (150 mg total) by mouth 2 (two) times a day, Disp: 180 capsule, Rfl: 0    triamcinolone (KENALOG) 0 1 % oral topical paste, Apply 1 application topically 2 (two) times a day (Patient not taking: Reported on 10/24/2019), Disp: 5 g, Rfl: 1    Objective:    Vitals:    12/03/19 1550   BP: 122/76   BP Location: Left arm   Patient Position: Sitting   Cuff Size: Standard   Pulse: 80   Weight: 79 4 kg (175 lb)   Height: 5' 4" (1 626 m)       Physical Exam   Constitutional: She is oriented to person, place, and time  She appears well-developed and well-nourished  HENT:   Head: Normocephalic  Mouth/Throat: Oropharynx is clear and moist    Eyes: Pupils are equal, round, and reactive to light  Conjunctivae are normal    Neck: Normal range of motion  Neck supple  Cardiovascular: Normal rate, regular rhythm and normal heart sounds  Pulmonary/Chest: Effort normal and breath sounds normal    Musculoskeletal:   No synovitis or joint swelling   +A few tender joints however no increased warmth of the joints  +Crepitus of the knees  Neurological: She is alert and oriented to person, place, and time  Skin: Skin is warm and dry  Psychiatric: She has a normal mood and affect   Her behavior is normal      Musculoskeletal--Peripheral Joint Exam:  Physical Exam     Tenderness:   RUE: ulnohumeral and radiohumeral  LUE: ulnohumeral and radiohumeral  RLE: acetabulofemoral and tibiotalar  LLE: acetabulofemoral and tibiotalar    ANAYA-28 tender joint count: 2  ANAYA-28 swollen joint count: 0  CRP (mg/L): 2  Patient global assessment: 55  ANAYA-28 CRP score: 2 92 (Low Disease Activity)          Labs:   Component      Latest Ref Rng & Units 11/22/2019   WBC      4 31 - 10 16 Thousand/uL 6 81   Red Blood Cell Count      3 81 - 5 12 Million/uL 4 87   Hemoglobin      11 5 - 15 4 g/dL 13 8   HCT      34 8 - 46 1 % 43 6   MCV      82 - 98 fL 90   MCH      26 8 - 34 3 pg 28 3   MCHC      31 4 - 37 4 g/dL 31 7   RDW      11 6 - 15 1 % 13 1   MPV 8 9 - 12 7 fL 13 0 (H)   Platelet Count      828 - 390 Thousands/uL 257   nRBC      /100 WBCs 0   Neutrophils %      43 - 75 % 50   Immat GRANS %      0 - 2 % 0   Lymphocytes Relative      14 - 44 % 38   Monocytes Relative      4 - 12 % 10   Eosinophils      0 - 6 % 1   Basophils Relative      0 - 1 % 1   Absolute Neutrophils      1 85 - 7 62 Thousands/µL 3 45   Immature Grans Absolute      0 00 - 0 20 Thousand/uL 0 01   Lymphocytes Absolute      0 60 - 4 47 Thousands/µL 2 59   Absolute Monocytes      0 17 - 1 22 Thousand/µL 0 65   Absolute Eosinophils      0 00 - 0 61 Thousand/µL 0 06   Basophils Absolute      0 00 - 0 10 Thousands/µL 0 05   Sodium      136 - 145 mmol/L 139   Potassium      3 5 - 5 3 mmol/L 4 2   Chloride      100 - 108 mmol/L 108   CO2      21 - 32 mmol/L 28   Anion Gap      4 - 13 mmol/L 3 (L)   BUN      5 - 25 mg/dL 8   Creatinine      0 60 - 1 30 mg/dL 0 68   GLUCOSE FASTING      65 - 99 mg/dL 90   Calcium      8 3 - 10 1 mg/dL 9 2   AST      5 - 45 U/L 19   ALT      12 - 78 U/L 27   Alkaline Phosphatase      46 - 116 U/L 41 (L)   Total Protein      6 4 - 8 2 g/dL 7 3   Albumin      3 5 - 5 0 g/dL 4 1   TOTAL BILIRUBIN      0 20 - 1 00 mg/dL 0 33   eGFR      ml/min/1 73sq m 119   C-REACTIVE PROTEIN      <3 0 mg/L <3 0   Sed Rate      0 - 20 mm/hour 9

## 2019-12-03 ENCOUNTER — OFFICE VISIT (OUTPATIENT)
Dept: RHEUMATOLOGY | Facility: CLINIC | Age: 29
End: 2019-12-03
Payer: COMMERCIAL

## 2019-12-03 VITALS
HEART RATE: 80 BPM | HEIGHT: 64 IN | BODY MASS INDEX: 29.88 KG/M2 | SYSTOLIC BLOOD PRESSURE: 122 MMHG | DIASTOLIC BLOOD PRESSURE: 76 MMHG | WEIGHT: 175 LBS

## 2019-12-03 DIAGNOSIS — M06.09 RHEUMATOID ARTHRITIS OF MULTIPLE SITES WITH NEGATIVE RHEUMATOID FACTOR (HCC): Primary | ICD-10-CM

## 2019-12-03 DIAGNOSIS — M79.7 FIBROMYALGIA: ICD-10-CM

## 2019-12-03 DIAGNOSIS — Z79.899 LONG-TERM USE OF IMMUNOSUPPRESSANT MEDICATION: ICD-10-CM

## 2019-12-03 PROCEDURE — 99214 OFFICE O/P EST MOD 30 MIN: CPT | Performed by: PHYSICIAN ASSISTANT

## 2019-12-04 ENCOUNTER — TELEPHONE (OUTPATIENT)
Dept: NEUROLOGY | Facility: CLINIC | Age: 29
End: 2019-12-04

## 2019-12-04 DIAGNOSIS — M79.7 FIBROMYALGIA: ICD-10-CM

## 2019-12-04 RX ORDER — PREGABALIN 150 MG/1
150 CAPSULE ORAL 2 TIMES DAILY
Qty: 180 CAPSULE | Refills: 0 | Status: SHIPPED | OUTPATIENT
Start: 2019-12-04 | End: 2020-03-16 | Stop reason: SDUPTHER

## 2019-12-04 NOTE — TELEPHONE ENCOUNTER
Called patient to make aware of [facility] location changes beginning 1/13/20  Left VM on machine to call back regarding her upcoming 1/30/20 Botox appointment  Patient's Botox must be rescheduled to either [facility] or [facility] offices due to schedule change  Patient also needs a follow up prior to her receiving her Botox

## 2019-12-12 ENCOUNTER — OFFICE VISIT (OUTPATIENT)
Dept: INTERNAL MEDICINE CLINIC | Facility: CLINIC | Age: 29
End: 2019-12-12
Payer: COMMERCIAL

## 2019-12-12 ENCOUNTER — TELEPHONE (OUTPATIENT)
Dept: INTERNAL MEDICINE CLINIC | Facility: CLINIC | Age: 29
End: 2019-12-12

## 2019-12-12 VITALS
SYSTOLIC BLOOD PRESSURE: 118 MMHG | HEART RATE: 74 BPM | RESPIRATION RATE: 18 BRPM | HEIGHT: 64 IN | OXYGEN SATURATION: 98 % | TEMPERATURE: 98.4 F | BODY MASS INDEX: 31.1 KG/M2 | DIASTOLIC BLOOD PRESSURE: 80 MMHG | WEIGHT: 182.2 LBS

## 2019-12-12 DIAGNOSIS — J02.9 ACUTE PHARYNGITIS, UNSPECIFIED ETIOLOGY: ICD-10-CM

## 2019-12-12 DIAGNOSIS — B97.89 VIRAL LARYNGITIS: Primary | ICD-10-CM

## 2019-12-12 DIAGNOSIS — J04.0 VIRAL LARYNGITIS: Primary | ICD-10-CM

## 2019-12-12 LAB — S PYO AG THROAT QL: NEGATIVE

## 2019-12-12 PROCEDURE — 1036F TOBACCO NON-USER: CPT | Performed by: NURSE PRACTITIONER

## 2019-12-12 PROCEDURE — 87880 STREP A ASSAY W/OPTIC: CPT | Performed by: NURSE PRACTITIONER

## 2019-12-12 PROCEDURE — 3008F BODY MASS INDEX DOCD: CPT | Performed by: NURSE PRACTITIONER

## 2019-12-12 PROCEDURE — 87147 CULTURE TYPE IMMUNOLOGIC: CPT | Performed by: NURSE PRACTITIONER

## 2019-12-12 PROCEDURE — 99213 OFFICE O/P EST LOW 20 MIN: CPT | Performed by: NURSE PRACTITIONER

## 2019-12-12 PROCEDURE — 87070 CULTURE OTHR SPECIMN AEROBIC: CPT | Performed by: NURSE PRACTITIONER

## 2019-12-12 NOTE — LETTER
December 12, 2019     Patient: Marcie Arevalo   YOB: 1990   Date of Visit: 12/12/2019       To Whom it May Concern:    Marcie Arevalo is under my professional care  She was seen in my office on 12/12/2019  She may return to work on 12/16/2019  If you have any questions or concerns, please don't hesitate to call           Sincerely,          Meg Chamorro

## 2019-12-12 NOTE — PROGRESS NOTES
Assessment/Plan:    Rapid strep negative  Increase oral fluids, tea with honey, voice rest, and tylenol/motrin for headache/sore throat  Call if not improving over the next week  Diagnoses and all orders for this visit:    Viral laryngitis    Acute pharyngitis, unspecified etiology  -     POCT rapid strepA  -     Throat culture; Future  -     Throat culture          Subjective:      Patient ID: Genesis Adams is a 30 y o  female  Here today for sore throat   Started yesterday   Also complaining of dry cough, loss of voice, and headache  Denies any body aches, nasal congestion,  or fever       The following portions of the patient's history were reviewed and updated as appropriate: allergies, current medications, past family history, past medical history, past social history, past surgical history and problem list     Review of Systems   Constitutional: Positive for fatigue  Negative for activity change, appetite change, chills and fever  HENT: Positive for sore throat  Negative for congestion, ear pain, postnasal drip, rhinorrhea and sinus pressure  Respiratory: Positive for cough  Negative for chest tightness and shortness of breath  Cardiovascular: Negative for chest pain  Gastrointestinal: Negative for abdominal pain, diarrhea, nausea and vomiting  Musculoskeletal: Negative for myalgias  Neurological: Positive for headaches  Negative for dizziness, weakness and light-headedness  Objective:      /80   Pulse 74   Temp 98 4 °F (36 9 °C)   Resp 18   Ht 5' 4" (1 626 m)   Wt 82 6 kg (182 lb 3 2 oz)   SpO2 98%   BMI 31 27 kg/m²          Physical Exam   Constitutional: She is oriented to person, place, and time  She appears well-developed and well-nourished  HENT:   Right Ear: External ear normal    Left Ear: External ear normal    Mouth/Throat: Posterior oropharyngeal erythema present  No oropharyngeal exudate or posterior oropharyngeal edema     Eyes: Pupils are equal, round, and reactive to light  Conjunctivae are normal    Cardiovascular: Normal rate, regular rhythm and normal heart sounds  Pulmonary/Chest: Effort normal and breath sounds normal    Lymphadenopathy:     She has no cervical adenopathy  Neurological: She is alert and oriented to person, place, and time  Skin: Skin is warm and dry  Psychiatric: She has a normal mood and affect  Her behavior is normal    Vitals reviewed  BMI Counseling: Body mass index is 31 27 kg/m²  The BMI is above normal  Nutrition recommendations include reducing portion sizes, 3-5 servings of fruits/vegetables daily, decreasing soda and/or juice intake and moderation in carbohydrate intake

## 2019-12-12 NOTE — TELEPHONE ENCOUNTER
She should come in and get tested for strep in the office  It can be viral, especially if she's losing her voice

## 2019-12-12 NOTE — PATIENT INSTRUCTIONS
Laryngitis   WHAT YOU NEED TO KNOW:   Laryngitis is when your larynx is swollen because of an infection or irritation  The larynx is also called the voice box because it contains your vocal cords  Your vocal cords also swell and change shape, which can cause your voice to sound different  DISCHARGE INSTRUCTIONS:   Take your medicine as directed  Contact your healthcare provider if you think your medicine is not helping or if you have side effects  Tell him of her if you are allergic to any medicine  Keep a list of the medicines, vitamins, and herbs you take  Include the amounts, and when and why you take them  Bring the list or the pill bottles to follow-up visits  Carry your medicine list with you in case of an emergency  Prevent laryngitis:   · Rest your voice:  Do not shout or scream if you get laryngitis often  This will help prevent swelling and irritation of your larynx  · Avoid irritants and harmful substances:  Do not breathe in chemicals or allergens, such as pollen  Alcohol and tobacco can also irritate your larynx  · Avoid foods and liquids that can cause acid reflux: These may include carbonated drinks, spicy foods and sauces, citrus fruit, peppermint, and chocolate  · Avoid the spread of germs:        [facility] [place] [facility] your hands often with soap and water  Carry germ-killing gel with you  You can use the gel to clean your hands when there is no soap and water available  ¨ Do not touch your eyes, nose, or mouth unless you have washed your hands first     ¨ Always cover your mouth when you cough  Cough into a tissue or your shirtsleeve so you do not spread germs from your hands  ¨ Try to avoid people who have a cold or the flu  If you are sick, stay away from others as much as possible  Follow up with your healthcare provider as directed:  Write down your questions so you remember to ask them during your visits  Contact your healthcare provider if:   · You have a fever      · You feel large, tender lumps in your neck  · You are hoarse for more than 7 days  · You have new or increased throat pain  · You have questions about your condition or care  Return to the emergency department if:   · Your throat is bleeding  · You are hoarse for more than 7 days and your chest feels tight  · You are drooling and have trouble swallowing  · You have trouble breathing  © 2017 [employer]  Information is for End User's use only and may not be sold, redistributed or otherwise used for commercial purposes  All illustrations and images included in CareNotes® are the copyrighted property of [employer]  or Valente Chapman  The above information is an  only  It is not intended as medical advice for individual conditions or treatments  Talk to your doctor, nurse or pharmacist before following any medical regimen to see if it is safe and effective for you

## 2019-12-12 NOTE — TELEPHONE ENCOUNTER
Patient thinks she has strep throat and wants to know if you can call in antibiotics for her  Sore throat  Scratchy, sounds like she is losing her voice  Red spots in back of throat      No Fever/ No other symptoms

## 2019-12-15 LAB — BACTERIA THROAT CULT: ABNORMAL

## 2019-12-16 DIAGNOSIS — J02.0 STREP PHARYNGITIS: Primary | ICD-10-CM

## 2019-12-16 RX ORDER — AMOXICILLIN 500 MG/1
500 CAPSULE ORAL EVERY 12 HOURS SCHEDULED
Qty: 20 CAPSULE | Refills: 0 | Status: SHIPPED | OUTPATIENT
Start: 2019-12-16 | End: 2019-12-26

## 2020-01-13 ENCOUNTER — TELEPHONE (OUTPATIENT)
Dept: NEUROLOGY | Facility: CLINIC | Age: 30
End: 2020-01-13

## 2020-01-13 NOTE — TELEPHONE ENCOUNTER
Type Date User Summary Attachment   General 01/13/2020  2:37 PM Gayathri Durham care coordination  -   Note    Botox- no authorization needed   Please use [facility]      Thank you,     Josie Baker

## 2020-01-13 NOTE — TELEPHONE ENCOUNTER
Called [facility]- spoke with Salomón Heller- I advised her I am calling to initiate a refill on the patient's Botox order  Refill request initiated  Order marked as STAT  Will do a status check in 2 days

## 2020-01-14 DIAGNOSIS — G43.709 CHRONIC MIGRAINE WITHOUT AURA WITHOUT STATUS MIGRAINOSUS, NOT INTRACTABLE: Primary | ICD-10-CM

## 2020-01-14 RX ORDER — ONABOTULINUMTOXINA 100 [USP'U]/1
INJECTION, POWDER, LYOPHILIZED, FOR SOLUTION INTRADERMAL; INTRAMUSCULAR
Qty: 2 EACH | Refills: 0 | Status: SHIPPED | OUTPATIENT
Start: 2020-01-14 | End: 2020-01-24

## 2020-01-15 NOTE — TELEPHONE ENCOUNTER
Called [facility]- spoke with Javi Ivy- she states the patient's Botox order is in pre-qu-A stage verifying the information  Patient's consent is not on file for 2020  Will do a status check in 2 days

## 2020-01-16 ENCOUNTER — SOCIAL WORK (OUTPATIENT)
Dept: BEHAVIORAL/MENTAL HEALTH CLINIC | Facility: CLINIC | Age: 30
End: 2020-01-16

## 2020-01-16 DIAGNOSIS — F41.1 GAD (GENERALIZED ANXIETY DISORDER): Primary | ICD-10-CM

## 2020-01-16 DIAGNOSIS — F33.1 MODERATE EPISODE OF RECURRENT MAJOR DEPRESSIVE DISORDER (HCC): ICD-10-CM

## 2020-01-16 DIAGNOSIS — Z72.89 SELF-INJURIOUS BEHAVIOR: ICD-10-CM

## 2020-01-16 DIAGNOSIS — F50.9 EATING DISORDER, UNSPECIFIED TYPE: ICD-10-CM

## 2020-01-16 NOTE — PSYCH
Psychotherapy Provided: Individual Psychotherapy 50 minutes     Length of time in session: 50 minutes, follow up in 2 week    Goals addressed in session: Goal 1, Goal 2 and Goal 3      Pain:      none    0    Current suicide risk : Low     D: Met with Catalina individually  Session focused upon struggle to maintain sessions due to working, school and internship  Feels principal who is also her intern supervisor is 'condescending'  School/work days have been very stressful  Acuity of Catalina's classroom is high  Still in relationship - going on 4 months  Zacarias Cash stated she 'testing' him through trying to push away  'I feel a real comfort with him'  They are both learning each other's triggers as he has a hx of childhood abandonment  Denied SIB, ED behaviors since last session  'I started to pull my hair but stopped immediately when we got into a fight because I am still so sensitive '   Denied SI      A: Zacarias Cash presented with appropriate mood and affect for topics of discussion  Zacarias Cash has so much on her plate with work, school and relationship  Progress with trying to see the grey but more practice/work needed  Cognizant of expectation she places on herself      P: Continue individual therapy, limited sessions due to times needed for sessions  Behavioral Health Treatment Plan [facility]: Diagnosis and Treatment Plan explained to Natha  relates understanding diagnosis and is agreeable to Treatment Plan   Yes

## 2020-01-20 NOTE — TELEPHONE ENCOUNTER
Called [facility]- spoke with Gina Hanley- she advised me that the patient's Botox order is currently in intake prep  Order is marked as STAT  Will do a status check in 2 days

## 2020-01-22 ENCOUNTER — TELEPHONE (OUTPATIENT)
Dept: NEUROLOGY | Facility: CLINIC | Age: 30
End: 2020-01-22

## 2020-01-22 NOTE — TELEPHONE ENCOUNTER
Patient returned my call- she is aware that she needs to r/s her Botox appointment- message sent to Dr Jonathan Diamond to see if she is willing to inject due to needing to be seen at the [facility] location  Patient does not wish to give consent to ship until she has a guarantee of an appointment due to her Botox being expensive  Once appointment is scheduled she will give consent to ship

## 2020-01-22 NOTE — TELEPHONE ENCOUNTER
Called [facility]- spoke with [facility]- he states the patient's Botox order is currently ready to be scheduled for delivery  Patient's consent is not on file  Will contact her to give consent to ship so we can set up delivery

## 2020-01-22 NOTE — TELEPHONE ENCOUNTER
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  I advised her that Kelby Montanez has been trying to get a hold of her to reschedule her Botox appointment she has scheduled on 1/30/20 with Nisha Cox in the [place] location  Patient prefers to go to the [facility] as this is closer to her work in [place]  She also needs to be scheduled after 330 as she works until 330  I attempted to see if I can get her in with Dr Justyn Floyd in [facility] but her last appointment time is at 3pm and patient would not be able to do that time or anything earlier  I also offered her an appointment with Nisha Cox at 4pm I celia- evie declined because she is not sure she would make it in time since it is a 45 minute drive from her work  I advised the patient that I would reach out to one of our providers that goes to the [facility] to see if they would be willing to inject Botox  Dr Yanira Titus,    Would you be willing to do this patient's Botox since she prefers [facility] due to where she works? Patient receives Botox from Nisha Cox for her chronic migraine  If you are please have Marven Cushing coordinate an Botox appointment for the patient after 330 in the [facility] location  I will CC Nisha Cox as well so she is aware  Thank you!     Surinder Calhoun

## 2020-01-22 NOTE — TELEPHONE ENCOUNTER
Attempted to contact the patient- left a message to give our office a call back in regards to her upcoming appointment  I also called the patient's mom, Tere Adorno to see if there was another contact number for the pt  She states she is at work and can never answer her phone  She states she will send her a text to give our office a call  When patient calls back we need to 1- reschedule her Botox appointment with [facility] on 1/30/2020 since she will not be in the office  2- patient needs to call [facility] to provide consent to ship her Botox order as it is ready to be scheduled for delivery  Please provide the patient with the [facility] number [facility] noted below        [facility]: [phone]

## 2020-01-24 DIAGNOSIS — G43.709 CHRONIC MIGRAINE WITHOUT AURA WITHOUT STATUS MIGRAINOSUS, NOT INTRACTABLE: ICD-10-CM

## 2020-01-24 RX ORDER — ONABOTULINUMTOXINA 100 [USP'U]/1
INJECTION, POWDER, LYOPHILIZED, FOR SOLUTION INTRADERMAL; INTRAMUSCULAR
Qty: 2 EACH | Refills: 0 | Status: SHIPPED | OUTPATIENT
Start: 2020-01-24 | End: 2022-06-06 | Stop reason: SDUPTHER

## 2020-01-27 NOTE — TELEPHONE ENCOUNTER
Makeda/ Kenna,      So sorry I documented this in the wrong encounter  You can close this one out I corrected myself  Thanks    Amanda Ward,    Patient's Botox order will be delivered tomorrow to the [facility] location      Thanks    Lisandra Ferguson

## 2020-01-27 NOTE — TELEPHONE ENCOUNTER
Called [facility]- spoke with Jazlyn Schwarz- she states the patient's Botox order is ready to be scheduled for delivery  Patient's consent is on file  Botox to be delivered on Tuesday 1/28/2020 to the [place] location via [employer]- a signature will be required  Called patient to confirm if she has given consent to ship or not  Left a detailed message stating if she has not called [facility] to give consent that she must call and do that as soon as possible since her appointment is scheduled for 1/28/20 with Dr Zaira Elliott  I provided the phone number in the message and advised her to call back to confirm if this has been taken care of or not  Makeda/ Kenna,    Please await Botox delivery and document once it has arrived  Please let me know if you do not receive the patient's Botox order       Thank you,    Georgiana Gilford

## 2020-01-27 NOTE — TELEPHONE ENCOUNTER
Called [facility]- spoke with Jackelyn Hamilton- she states the patient's Botox order is ready to be scheduled for delivery  Patient's consent is on file  Botox to be delivered on Tuesday 1/28/2020 to the [facility] location via [employer]- a signature will be required          Called patient to confirm if she has given consent to ship or not  Left a detailed message stating if she has not called [facility] to give consent that she must call and do that as soon as possible since her appointment is scheduled for 1/28/20 with Dr Meryl Yeung  I provided the phone number in the message and advised her to call back to confirm if this has been taken care of or not          Makeda/ Kenna,     Please await Botox delivery and document once it has arrived   Please let me know if you do not receive the patient's Botox order       Thank you,     Kayla Roberto

## 2020-01-28 ENCOUNTER — PROCEDURE VISIT (OUTPATIENT)
Dept: NEUROLOGY | Facility: CLINIC | Age: 30
End: 2020-01-28
Payer: COMMERCIAL

## 2020-01-28 VITALS — SYSTOLIC BLOOD PRESSURE: 120 MMHG | TEMPERATURE: 97.9 F | HEART RATE: 80 BPM | DIASTOLIC BLOOD PRESSURE: 80 MMHG

## 2020-01-28 DIAGNOSIS — G43.709 CHRONIC MIGRAINE WITHOUT AURA WITHOUT STATUS MIGRAINOSUS, NOT INTRACTABLE: Primary | ICD-10-CM

## 2020-01-28 PROCEDURE — 64615 CHEMODENERV MUSC MIGRAINE: CPT | Performed by: PSYCHIATRY & NEUROLOGY

## 2020-01-28 NOTE — TELEPHONE ENCOUNTER
Botox number of units: 100 UNITS   Botox quantity: 2  Arrived at what location: [facility]  Lot number: [id number], [id number]

## 2020-01-28 NOTE — PROGRESS NOTES
Chemodenervation  Date/Time: 1/28/2020 3:49 PM  Performed by: Igor Dennis MD  Authorized by: Igor Dennis MD     Pre-procedure details:     Prepped With: Alcohol    Anesthesia (see MAR for exact dosages): Anesthesia method:  None  Botox:     Botox Type:  Type A    Brand:  Botox    mL's of Botulinum Toxin:  190    Medication Administration:  100 Units onabotulinumtoxin A 100 units  Procedures:     Botox Procedures: chronic headache      Indications: migraines    Injection Location:     Head / Face:  L frontalis, R frontalis, R , L , L inferior cervical paraspinal, R inferior cervical paraspinal, L superior cervical paraspinal, R superior cervical paraspinal, procerus, L inferior occipitalis, R inferior occipitalis, L inferior trapezius, R inferior trapezius, L temporalis and R temporalis    L  injection amount:  5 unit(s)    R  injection amount:  5 unit(s)    L lateral frontalis:  5 unit(s)    R lateral frontalis:  5 unit(s)    L medial frontalis:  5 unit(s)    R medial frontalis:  5 unit(s)    L temporalis injection amount:  20 unit(s)    R temporalis injection amount:  20 unit(s)    Procerus injection amount:  5 unit(s)    L inferior occipitalis injection amount:  10 unit(s)    R inferior occipitalis injection amount:  10 unit(s)    L inferior cervical paraspinal injection amount:  5 unit(s)    R inferior cervical paraspinal injection amount:  5 unit(s)    L superior cervical paraspinal injection amount:  10 unit(s)    R superior cervical paraspinal injection amount:  10 unit(s)    L inferior trapezius injection amount:  15 unit(s)    R inferior trapezius injection amount:  15 unit(s)    Comments:  25 units additional was given scalp and trapezius and 5 units on each masseter given   these were all medically necessary   Total Units:     Total units used:  190    Total units discarded:  10  Post-procedure details:     Chemodenervation:  Chronic migraine    Facial Nerve Location[de-identified]  Bilateral facial nerve    Patient tolerance of procedure:   Tolerated well, no immediate complications

## 2020-01-30 DIAGNOSIS — R61 NIGHT SWEATS: Primary | ICD-10-CM

## 2020-02-05 ENCOUNTER — APPOINTMENT (OUTPATIENT)
Dept: LAB | Facility: CLINIC | Age: 30
End: 2020-02-05
Payer: COMMERCIAL

## 2020-02-05 ENCOUNTER — TRANSCRIBE ORDERS (OUTPATIENT)
Dept: LAB | Facility: CLINIC | Age: 30
End: 2020-02-05

## 2020-02-05 DIAGNOSIS — R61 NIGHT SWEATS: ICD-10-CM

## 2020-02-05 LAB
ALBUMIN SERPL BCP-MCNC: 4 G/DL (ref 3.5–5)
ALP SERPL-CCNC: 51 U/L (ref 46–116)
ALT SERPL W P-5'-P-CCNC: 33 U/L (ref 12–78)
ANION GAP SERPL CALCULATED.3IONS-SCNC: 10 MMOL/L (ref 4–13)
AST SERPL W P-5'-P-CCNC: 16 U/L (ref 5–45)
BASOPHILS # BLD AUTO: 0.03 THOUSANDS/ΜL (ref 0–0.1)
BASOPHILS NFR BLD AUTO: 0 % (ref 0–1)
BILIRUB SERPL-MCNC: 0.45 MG/DL (ref 0.2–1)
BUN SERPL-MCNC: 10 MG/DL (ref 5–25)
CALCIUM SERPL-MCNC: 9.8 MG/DL (ref 8.3–10.1)
CHLORIDE SERPL-SCNC: 102 MMOL/L (ref 100–108)
CO2 SERPL-SCNC: 29 MMOL/L (ref 21–32)
CREAT SERPL-MCNC: 0.66 MG/DL (ref 0.6–1.3)
EOSINOPHIL # BLD AUTO: 0.07 THOUSAND/ΜL (ref 0–0.61)
EOSINOPHIL NFR BLD AUTO: 1 % (ref 0–6)
ERYTHROCYTE [DISTWIDTH] IN BLOOD BY AUTOMATED COUNT: 13.2 % (ref 11.6–15.1)
GFR SERPL CREATININE-BSD FRML MDRD: 121 ML/MIN/1.73SQ M
GLUCOSE SERPL-MCNC: 95 MG/DL (ref 65–140)
HCT VFR BLD AUTO: 42 % (ref 34.8–46.1)
HGB BLD-MCNC: 13.8 G/DL (ref 11.5–15.4)
IMM GRANULOCYTES # BLD AUTO: 0.03 THOUSAND/UL (ref 0–0.2)
IMM GRANULOCYTES NFR BLD AUTO: 0 % (ref 0–2)
LYMPHOCYTES # BLD AUTO: 3.13 THOUSANDS/ΜL (ref 0.6–4.47)
LYMPHOCYTES NFR BLD AUTO: 32 % (ref 14–44)
MCH RBC QN AUTO: 29.2 PG (ref 26.8–34.3)
MCHC RBC AUTO-ENTMCNC: 32.9 G/DL (ref 31.4–37.4)
MCV RBC AUTO: 89 FL (ref 82–98)
MONOCYTES # BLD AUTO: 0.96 THOUSAND/ΜL (ref 0.17–1.22)
MONOCYTES NFR BLD AUTO: 10 % (ref 4–12)
NEUTROPHILS # BLD AUTO: 5.59 THOUSANDS/ΜL (ref 1.85–7.62)
NEUTS SEG NFR BLD AUTO: 57 % (ref 43–75)
NRBC BLD AUTO-RTO: 0 /100 WBCS
PLATELET # BLD AUTO: 264 THOUSANDS/UL (ref 149–390)
PMV BLD AUTO: 10.9 FL (ref 8.9–12.7)
POTASSIUM SERPL-SCNC: 4.4 MMOL/L (ref 3.5–5.3)
PROT SERPL-MCNC: 7.8 G/DL (ref 6.4–8.2)
RBC # BLD AUTO: 4.72 MILLION/UL (ref 3.81–5.12)
SODIUM SERPL-SCNC: 141 MMOL/L (ref 136–145)
TSH SERPL DL<=0.05 MIU/L-ACNC: 1.48 UIU/ML (ref 0.36–3.74)
WBC # BLD AUTO: 9.81 THOUSAND/UL (ref 4.31–10.16)

## 2020-02-05 PROCEDURE — 84443 ASSAY THYROID STIM HORMONE: CPT

## 2020-02-05 PROCEDURE — 36415 COLL VENOUS BLD VENIPUNCTURE: CPT

## 2020-02-05 PROCEDURE — 85025 COMPLETE CBC W/AUTO DIFF WBC: CPT

## 2020-02-05 PROCEDURE — 80053 COMPREHEN METABOLIC PANEL: CPT

## 2020-03-09 ENCOUNTER — SOCIAL WORK (OUTPATIENT)
Dept: BEHAVIORAL/MENTAL HEALTH CLINIC | Facility: CLINIC | Age: 30
End: 2020-03-09
Payer: COMMERCIAL

## 2020-03-09 DIAGNOSIS — F33.1 MODERATE EPISODE OF RECURRENT MAJOR DEPRESSIVE DISORDER (HCC): ICD-10-CM

## 2020-03-09 DIAGNOSIS — F50.9 EATING DISORDER, UNSPECIFIED TYPE: ICD-10-CM

## 2020-03-09 DIAGNOSIS — F41.1 GAD (GENERALIZED ANXIETY DISORDER): Primary | ICD-10-CM

## 2020-03-09 DIAGNOSIS — F43.10 PTSD (POST-TRAUMATIC STRESS DISORDER): ICD-10-CM

## 2020-03-09 DIAGNOSIS — Z72.89 SELF-INJURIOUS BEHAVIOR: ICD-10-CM

## 2020-03-09 PROCEDURE — 90834 PSYTX W PT 45 MINUTES: CPT | Performed by: SOCIAL WORKER
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
## 2020-03-11 ENCOUNTER — TELEPHONE (OUTPATIENT)
Dept: INTERNAL MEDICINE CLINIC | Facility: CLINIC | Age: 30
End: 2020-03-11

## 2020-03-13 ENCOUNTER — TELEPHONE (OUTPATIENT)
Dept: NEUROLOGY | Facility: CLINIC | Age: 30
End: 2020-03-13

## 2020-03-13 NOTE — TELEPHONE ENCOUNTER
General 03/13/2020  1:33 PM Madina Read MA CARE COORDINATION -   Note    BOTOX 200 UNITS (LAST VISIT) - CASE# [id number]  4 VISITS, AV- 07/09/2019 TILL 07/08/2020    [facility]

## 2020-03-16 DIAGNOSIS — M79.7 FIBROMYALGIA: ICD-10-CM

## 2020-03-16 RX ORDER — PREGABALIN 150 MG/1
150 CAPSULE ORAL 2 TIMES DAILY
Qty: 180 CAPSULE | Refills: 0 | Status: SHIPPED | OUTPATIENT
Start: 2020-03-16 | End: 2020-05-29 | Stop reason: SDUPTHER

## 2020-04-01 NOTE — TELEPHONE ENCOUNTER
Called [facility] to initiate refill request for patient's Botox 100 unit sdv qty 2 and spoke with rep Maisie Closs who stated no more refills left on RX and patient's consent for 2020 is on file  Call transferred to pharmacists Elgin Curran to provide new verbal order  Botox 100 unit sdv qty 2 with 3 refills under Dr Justyn Floyd  Do status check in 3 days

## 2020-04-03 NOTE — TELEPHONE ENCOUNTER
Called [facility] to check on status of Botox order  Spoke with rep Gerry Damian and she stated order has just gone into insurance verification process  Do status check in 3-4 days

## 2020-04-06 ENCOUNTER — TELEMEDICINE (OUTPATIENT)
Dept: OBGYN CLINIC | Facility: CLINIC | Age: 30
End: 2020-04-06
Payer: COMMERCIAL

## 2020-04-06 VITALS — WEIGHT: 175 LBS | BODY MASS INDEX: 30.04 KG/M2

## 2020-04-06 DIAGNOSIS — R61 NIGHT SWEATS: ICD-10-CM

## 2020-04-06 DIAGNOSIS — L68.9 EXCESSIVE HAIR GROWTH: Primary | ICD-10-CM

## 2020-04-06 PROCEDURE — 99213 OFFICE O/P EST LOW 20 MIN: CPT | Performed by: OBSTETRICS & GYNECOLOGY

## 2020-04-08 NOTE — TELEPHONE ENCOUNTER
Received a call from Florian Flores- she states the patient's Botox authorization was approved for Botox 100 units and the last script was called in as Botox 200 units  She states we will need to call in a new verbal Rx in order this to be processed with the current authorization  I advised her that I would call and take care of this today  She states she will put this as a follow-up and check on it through out the day so she can process this claim  [address]- spoke with Prosper Torres- I advised her I needed to call in a new verbal Rx due to the authorization being approved for Botox 100 unit vial not a 200 unit vial  She was able to transfer me to a pharmacist to provide a new verbal Rx  Spoke with [facility], pharmacist- provided a verbal Rx for Botox 100 units QTY: 1 under Katheryn Tom for Chronic migraine with 3 refills

## 2020-04-08 NOTE — TELEPHONE ENCOUNTER
Called and spoke with Xavi Foster- I advised her that I needed help processing the patient's Botox order  I provided her with the authorization information and total billable units  She will work this order and process it  Will do a status check in 2 days

## 2020-04-09 NOTE — TELEPHONE ENCOUNTER
[address]- spoke with Kailee Silvestre- she states the patient's Botox order is ready to be scheduled for delivery  Patient's consent is not on file  Will contact the patient and have her call the pharmacy to provide consent to ship

## 2020-04-09 NOTE — TELEPHONE ENCOUNTER
Called and spoke with the patient- she is aware that her Botox order is ready to be scheduled for delivery  Patient states she will call and provide consent to ship as soon as we get off the phone  I provided the patient with the phone number for [facility] specialty pharmacy  I also confirmed her appointment date/ time and location

## 2020-04-10 ENCOUNTER — LAB (OUTPATIENT)
Dept: LAB | Facility: CLINIC | Age: 30
End: 2020-04-10
Payer: COMMERCIAL

## 2020-04-10 ENCOUNTER — TRANSCRIBE ORDERS (OUTPATIENT)
Dept: LAB | Facility: CLINIC | Age: 30
End: 2020-04-10

## 2020-04-10 DIAGNOSIS — R61 NIGHT SWEATS: ICD-10-CM

## 2020-04-10 DIAGNOSIS — L68.9 EXCESSIVE HAIR GROWTH: ICD-10-CM

## 2020-04-10 LAB
EST. AVERAGE GLUCOSE BLD GHB EST-MCNC: 108 MG/DL
ESTRADIOL SERPL-MCNC: 348 PG/ML
FSH SERPL-ACNC: 5 MIU/ML
HBA1C MFR BLD: 5.4 %

## 2020-04-10 PROCEDURE — 82670 ASSAY OF TOTAL ESTRADIOL: CPT

## 2020-04-10 PROCEDURE — 36415 COLL VENOUS BLD VENIPUNCTURE: CPT

## 2020-04-10 PROCEDURE — 83516 IMMUNOASSAY NONANTIBODY: CPT

## 2020-04-10 PROCEDURE — 83036 HEMOGLOBIN GLYCOSYLATED A1C: CPT

## 2020-04-10 PROCEDURE — 83001 ASSAY OF GONADOTROPIN (FSH): CPT

## 2020-04-10 NOTE — TELEPHONE ENCOUNTER
[address] [address]- spoke with Zeinab Rodriguez- she states the patient's Botox order is ready to be scheduled for delivery  Patient's consent is on file and balance has been taken care of  Botox to be delivered on Tuesday 4/14/2020 to the [place] location via [employer] priority overnight- a signature will be required  Coreen/ Giovanny Police,    Please await Botox delivery and document once it has arrived  Please let me know if you do not receive the patient's Botox order      Thank you,    Josie Baker

## 2020-04-10 NOTE — TELEPHONE ENCOUNTER
[address]- spoke with Kelby Oakley- she states the patient's Botox order is ready to be scheduled for delivery  Patient's consent is on file  Patient has a previous balance of $282 85- patient would need to make some type of payment towards this balance to bring it down to $250 00 in order to have her Botox shipped out to our office for her upcoming appointment

## 2020-04-14 NOTE — TELEPHONE ENCOUNTER
Botox number of units: 200 Units  Botox quantity: 2  Arrived at what location: [place]  Lot number: [id number]    Botox arrived in the [place] location and has been put away into the fridge

## 2020-04-16 LAB — MIS SERPL-MCNC: 3.63 NG/ML

## 2020-04-23 ENCOUNTER — TELEPHONE (OUTPATIENT)
Dept: NEUROLOGY | Facility: CLINIC | Age: 30
End: 2020-04-23

## 2020-04-30 ENCOUNTER — PROCEDURE VISIT (OUTPATIENT)
Dept: NEUROLOGY | Facility: CLINIC | Age: 30
End: 2020-04-30
Payer: COMMERCIAL

## 2020-04-30 VITALS — SYSTOLIC BLOOD PRESSURE: 110 MMHG | DIASTOLIC BLOOD PRESSURE: 80 MMHG | HEART RATE: 80 BPM | TEMPERATURE: 97.8 F

## 2020-04-30 DIAGNOSIS — G43.709 CHRONIC MIGRAINE WITHOUT AURA WITHOUT STATUS MIGRAINOSUS, NOT INTRACTABLE: Primary | ICD-10-CM

## 2020-04-30 PROCEDURE — 64615 CHEMODENERV MUSC MIGRAINE: CPT | Performed by: PSYCHIATRY & NEUROLOGY

## 2020-05-14 ENCOUNTER — SOCIAL WORK (OUTPATIENT)
Dept: BEHAVIORAL/MENTAL HEALTH CLINIC | Facility: CLINIC | Age: 30
End: 2020-05-14
Payer: COMMERCIAL

## 2020-05-14 DIAGNOSIS — F41.1 GAD (GENERALIZED ANXIETY DISORDER): Primary | ICD-10-CM

## 2020-05-14 DIAGNOSIS — F33.1 MODERATE EPISODE OF RECURRENT MAJOR DEPRESSIVE DISORDER (HCC): ICD-10-CM

## 2020-05-14 DIAGNOSIS — Z72.89 SELF-INJURIOUS BEHAVIOR: ICD-10-CM

## 2020-05-14 DIAGNOSIS — F50.9 EATING DISORDER, UNSPECIFIED TYPE: ICD-10-CM

## 2020-05-14 PROBLEM — F43.10 PTSD (POST-TRAUMATIC STRESS DISORDER): Status: RESOLVED | Noted: 2018-02-05 | Resolved: 2020-05-14

## 2020-05-14 PROCEDURE — 90834 PSYTX W PT 45 MINUTES: CPT | Performed by: SOCIAL WORKER

## 2020-05-18 ENCOUNTER — TELEMEDICINE (OUTPATIENT)
Dept: BEHAVIORAL/MENTAL HEALTH CLINIC | Facility: CLINIC | Age: 30
End: 2020-05-18
Payer: COMMERCIAL

## 2020-05-18 DIAGNOSIS — F50.9 EATING DISORDER, UNSPECIFIED TYPE: ICD-10-CM

## 2020-05-18 DIAGNOSIS — F41.1 GAD (GENERALIZED ANXIETY DISORDER): Primary | ICD-10-CM

## 2020-05-18 DIAGNOSIS — F33.1 MODERATE EPISODE OF RECURRENT MAJOR DEPRESSIVE DISORDER (HCC): ICD-10-CM

## 2020-05-18 DIAGNOSIS — Z72.89 SELF-INJURIOUS BEHAVIOR: ICD-10-CM

## 2020-05-18 PROCEDURE — 98968 PH1 ASSMT&MGMT NQHP 21-30: CPT | Performed by: SOCIAL WORKER

## 2020-05-26 ENCOUNTER — OFFICE VISIT (OUTPATIENT)
Dept: INTERNAL MEDICINE CLINIC | Facility: CLINIC | Age: 30
End: 2020-05-26
Payer: COMMERCIAL

## 2020-05-26 VITALS
TEMPERATURE: 98.1 F | WEIGHT: 178 LBS | DIASTOLIC BLOOD PRESSURE: 82 MMHG | BODY MASS INDEX: 30.39 KG/M2 | HEART RATE: 77 BPM | HEIGHT: 64 IN | SYSTOLIC BLOOD PRESSURE: 122 MMHG | OXYGEN SATURATION: 98 %

## 2020-05-26 DIAGNOSIS — M06.09 RHEUMATOID ARTHRITIS OF MULTIPLE SITES WITH NEGATIVE RHEUMATOID FACTOR (HCC): ICD-10-CM

## 2020-05-26 DIAGNOSIS — M79.7 FIBROMYALGIA: ICD-10-CM

## 2020-05-26 DIAGNOSIS — F33.1 MODERATE EPISODE OF RECURRENT MAJOR DEPRESSIVE DISORDER (HCC): Primary | ICD-10-CM

## 2020-05-26 DIAGNOSIS — F41.1 GAD (GENERALIZED ANXIETY DISORDER): ICD-10-CM

## 2020-05-26 PROCEDURE — 3008F BODY MASS INDEX DOCD: CPT | Performed by: NURSE PRACTITIONER

## 2020-05-26 PROCEDURE — 1036F TOBACCO NON-USER: CPT | Performed by: NURSE PRACTITIONER

## 2020-05-26 PROCEDURE — 99213 OFFICE O/P EST LOW 20 MIN: CPT | Performed by: NURSE PRACTITIONER

## 2020-05-26 RX ORDER — BUSPIRONE HYDROCHLORIDE 7.5 MG/1
7.5 TABLET ORAL 2 TIMES DAILY
Qty: 60 TABLET | Refills: 0 | Status: SHIPPED | OUTPATIENT
Start: 2020-05-26 | End: 2020-06-25 | Stop reason: SDUPTHER

## 2020-05-26 RX ORDER — DULOXETIN HYDROCHLORIDE 30 MG/1
CAPSULE, DELAYED RELEASE ORAL
Qty: 60 CAPSULE | Refills: 0 | Status: SHIPPED | OUTPATIENT
Start: 2020-05-26 | End: 2020-06-25 | Stop reason: SDUPTHER

## 2020-05-26 RX ORDER — PREGABALIN 150 MG/1
150 CAPSULE ORAL 2 TIMES DAILY
Qty: 180 CAPSULE | Refills: 0 | Status: CANCELLED | OUTPATIENT
Start: 2020-05-26

## 2020-05-27 DIAGNOSIS — M79.7 FIBROMYALGIA: ICD-10-CM

## 2020-05-27 RX ORDER — PREGABALIN 150 MG/1
150 CAPSULE ORAL 2 TIMES DAILY
Qty: 180 CAPSULE | Refills: 0 | Status: CANCELLED | OUTPATIENT
Start: 2020-05-27

## 2020-06-01 ENCOUNTER — TELEPHONE (OUTPATIENT)
Dept: OBGYN CLINIC | Facility: CLINIC | Age: 30
End: 2020-06-01

## 2020-06-01 DIAGNOSIS — Z11.3 SCREENING EXAMINATION FOR STD (SEXUALLY TRANSMITTED DISEASE): Primary | ICD-10-CM

## 2020-06-02 ENCOUNTER — TELEPHONE (OUTPATIENT)
Dept: NEUROLOGY | Facility: CLINIC | Age: 30
End: 2020-06-02

## 2020-06-03 ENCOUNTER — OFFICE VISIT (OUTPATIENT)
Dept: NEUROLOGY | Facility: CLINIC | Age: 30
End: 2020-06-03
Payer: COMMERCIAL

## 2020-06-03 ENCOUNTER — TRANSCRIBE ORDERS (OUTPATIENT)
Dept: LAB | Facility: CLINIC | Age: 30
End: 2020-06-03

## 2020-06-03 ENCOUNTER — LAB (OUTPATIENT)
Dept: LAB | Facility: CLINIC | Age: 30
End: 2020-06-03
Payer: COMMERCIAL

## 2020-06-03 VITALS
HEIGHT: 64 IN | DIASTOLIC BLOOD PRESSURE: 70 MMHG | HEART RATE: 72 BPM | SYSTOLIC BLOOD PRESSURE: 110 MMHG | TEMPERATURE: 98.6 F | BODY MASS INDEX: 29.88 KG/M2 | WEIGHT: 175 LBS

## 2020-06-03 DIAGNOSIS — Z11.3 SCREENING EXAMINATION FOR STD (SEXUALLY TRANSMITTED DISEASE): ICD-10-CM

## 2020-06-03 DIAGNOSIS — G43.709 CHRONIC MIGRAINE WITHOUT AURA WITHOUT STATUS MIGRAINOSUS, NOT INTRACTABLE: Primary | ICD-10-CM

## 2020-06-03 LAB
C TRACH DNA SPEC QL NAA+PROBE: NEGATIVE
HBV SURFACE AG SER QL: NORMAL
N GONORRHOEA DNA SPEC QL NAA+PROBE: NEGATIVE

## 2020-06-03 PROCEDURE — 87591 N.GONORRHOEAE DNA AMP PROB: CPT

## 2020-06-03 PROCEDURE — 86592 SYPHILIS TEST NON-TREP QUAL: CPT

## 2020-06-03 PROCEDURE — 99214 OFFICE O/P EST MOD 30 MIN: CPT | Performed by: PSYCHIATRY & NEUROLOGY

## 2020-06-03 PROCEDURE — 87491 CHLMYD TRACH DNA AMP PROBE: CPT

## 2020-06-03 PROCEDURE — 87389 HIV-1 AG W/HIV-1&-2 AB AG IA: CPT

## 2020-06-03 PROCEDURE — 3008F BODY MASS INDEX DOCD: CPT | Performed by: PSYCHIATRY & NEUROLOGY

## 2020-06-03 PROCEDURE — 36415 COLL VENOUS BLD VENIPUNCTURE: CPT

## 2020-06-03 PROCEDURE — 87340 HEPATITIS B SURFACE AG IA: CPT

## 2020-06-03 PROCEDURE — 1036F TOBACCO NON-USER: CPT | Performed by: PSYCHIATRY & NEUROLOGY

## 2020-06-04 LAB
HIV 1+2 AB+HIV1 P24 AG SERPL QL IA: NORMAL
RPR SER QL: NORMAL

## 2020-06-05 ENCOUNTER — TELEPHONE (OUTPATIENT)
Dept: NEUROLOGY | Facility: CLINIC | Age: 30
End: 2020-06-05

## 2020-06-09 ENCOUNTER — SOCIAL WORK (OUTPATIENT)
Dept: BEHAVIORAL/MENTAL HEALTH CLINIC | Facility: CLINIC | Age: 30
End: 2020-06-09

## 2020-06-09 DIAGNOSIS — F33.1 MODERATE EPISODE OF RECURRENT MAJOR DEPRESSIVE DISORDER (HCC): Primary | ICD-10-CM

## 2020-06-09 DIAGNOSIS — F41.1 GAD (GENERALIZED ANXIETY DISORDER): ICD-10-CM

## 2020-06-09 DIAGNOSIS — Z72.89 SELF-INJURIOUS BEHAVIOR: ICD-10-CM

## 2020-06-11 ENCOUNTER — TELEPHONE (OUTPATIENT)
Dept: OBGYN CLINIC | Facility: CLINIC | Age: 30
End: 2020-06-11

## 2020-06-11 DIAGNOSIS — M79.7 FIBROMYALGIA: ICD-10-CM

## 2020-06-11 RX ORDER — PREGABALIN 150 MG/1
150 CAPSULE ORAL 2 TIMES DAILY
Qty: 180 CAPSULE | Refills: 0 | Status: SHIPPED | OUTPATIENT
Start: 2020-06-11 | End: 2020-09-25 | Stop reason: SDUPTHER

## 2020-06-25 DIAGNOSIS — F33.1 MODERATE EPISODE OF RECURRENT MAJOR DEPRESSIVE DISORDER (HCC): ICD-10-CM

## 2020-06-25 RX ORDER — DULOXETIN HYDROCHLORIDE 30 MG/1
CAPSULE, DELAYED RELEASE ORAL
Qty: 60 CAPSULE | Refills: 0 | Status: SHIPPED | OUTPATIENT
Start: 2020-06-25 | End: 2020-07-24 | Stop reason: SDUPTHER

## 2020-06-25 RX ORDER — BUSPIRONE HYDROCHLORIDE 7.5 MG/1
7.5 TABLET ORAL 2 TIMES DAILY
Qty: 60 TABLET | Refills: 0 | Status: SHIPPED | OUTPATIENT
Start: 2020-06-25 | End: 2020-07-08 | Stop reason: SDUPTHER

## 2020-06-30 ENCOUNTER — OFFICE VISIT (OUTPATIENT)
Dept: ENDOCRINOLOGY | Facility: CLINIC | Age: 30
End: 2020-06-30
Payer: COMMERCIAL

## 2020-06-30 VITALS
HEIGHT: 64 IN | BODY MASS INDEX: 31.24 KG/M2 | HEART RATE: 82 BPM | DIASTOLIC BLOOD PRESSURE: 78 MMHG | TEMPERATURE: 97.1 F | SYSTOLIC BLOOD PRESSURE: 110 MMHG | WEIGHT: 183 LBS

## 2020-06-30 DIAGNOSIS — E28.2 PCOS (POLYCYSTIC OVARIAN SYNDROME): Primary | ICD-10-CM

## 2020-06-30 DIAGNOSIS — E55.9 VITAMIN D DEFICIENCY: ICD-10-CM

## 2020-06-30 DIAGNOSIS — M06.09 RHEUMATOID ARTHRITIS OF MULTIPLE SITES WITH NEGATIVE RHEUMATOID FACTOR (HCC): ICD-10-CM

## 2020-06-30 DIAGNOSIS — R61 HYPERHIDROSIS: ICD-10-CM

## 2020-06-30 PROCEDURE — 99204 OFFICE O/P NEW MOD 45 MIN: CPT | Performed by: INTERNAL MEDICINE

## 2020-06-30 PROCEDURE — 3008F BODY MASS INDEX DOCD: CPT | Performed by: PSYCHIATRY & NEUROLOGY

## 2020-06-30 PROCEDURE — 1036F TOBACCO NON-USER: CPT | Performed by: INTERNAL MEDICINE

## 2020-06-30 PROCEDURE — 3008F BODY MASS INDEX DOCD: CPT | Performed by: INTERNAL MEDICINE

## 2020-06-30 RX ORDER — CLINDAMYCIN PHOSPHATE AND BENZOYL PEROXIDE 10; 50 MG/G; MG/G
GEL TOPICAL
COMMUNITY
Start: 2020-06-25 | End: 2022-02-07

## 2020-07-08 ENCOUNTER — TELEPHONE (OUTPATIENT)
Dept: INTERNAL MEDICINE CLINIC | Facility: CLINIC | Age: 30
End: 2020-07-08

## 2020-07-08 DIAGNOSIS — F33.1 MODERATE EPISODE OF RECURRENT MAJOR DEPRESSIVE DISORDER (HCC): ICD-10-CM

## 2020-07-08 RX ORDER — BUSPIRONE HYDROCHLORIDE 7.5 MG/1
7.5 TABLET ORAL 2 TIMES DAILY
Qty: 180 TABLET | Refills: 0 | Status: SHIPPED | OUTPATIENT
Start: 2020-07-08 | End: 2020-08-11 | Stop reason: SDUPTHER

## 2020-07-08 NOTE — TELEPHONE ENCOUNTER
Regarding: FW: Prescription Question  Contact: [phone]   Please call her and let her know that since she saw psychiatry back in June , I would advise her to call them for refills on her buspar        ----- Message -----  From: Jenny Schneider  Sent: 7/8/2020   2:25 PM EDT  To: ORAL Damon  Subject: Prescription Question                            ----- Message from Jenny Schneider sent at 7/8/2020  2:25 PM EDT -----       ----- Message from Elie Llamas to [facility] sent at 7/8/2020  2:23 PM -----   Hello! My scheduled appointment for psychology is in August  Can you please send my Buspirone prescription to [facility]? They offer 90 day supplies and are cheaper for me  Thank you so much!

## 2020-07-08 NOTE — TELEPHONE ENCOUNTER
Patient did not actually seen them in June because that appt was missed      Pt see's them in August and wants to know if you can refill until then

## 2020-07-10 ENCOUNTER — SOCIAL WORK (OUTPATIENT)
Dept: BEHAVIORAL/MENTAL HEALTH CLINIC | Facility: CLINIC | Age: 30
End: 2020-07-10
Payer: COMMERCIAL

## 2020-07-10 DIAGNOSIS — F41.1 GAD (GENERALIZED ANXIETY DISORDER): Primary | ICD-10-CM

## 2020-07-10 DIAGNOSIS — F33.1 MODERATE EPISODE OF RECURRENT MAJOR DEPRESSIVE DISORDER (HCC): ICD-10-CM

## 2020-07-10 DIAGNOSIS — Z72.89 SELF-INJURIOUS BEHAVIOR: ICD-10-CM

## 2020-07-10 DIAGNOSIS — F50.9 EATING DISORDER, UNSPECIFIED TYPE: ICD-10-CM

## 2020-07-10 PROCEDURE — 90834 PSYTX W PT 45 MINUTES: CPT | Performed by: SOCIAL WORKER

## 2020-07-10 PROCEDURE — 1036F TOBACCO NON-USER: CPT | Performed by: SOCIAL WORKER
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
## 2020-07-11 ENCOUNTER — TRANSCRIBE ORDERS (OUTPATIENT)
Dept: LAB | Facility: CLINIC | Age: 30
End: 2020-07-11

## 2020-07-11 ENCOUNTER — APPOINTMENT (OUTPATIENT)
Dept: LAB | Facility: CLINIC | Age: 30
End: 2020-07-11
Payer: COMMERCIAL

## 2020-07-11 DIAGNOSIS — R61 HYPERHIDROSIS: ICD-10-CM

## 2020-07-11 DIAGNOSIS — E28.2 PCOS (POLYCYSTIC OVARIAN SYNDROME): ICD-10-CM

## 2020-07-11 DIAGNOSIS — E55.9 VITAMIN D DEFICIENCY: ICD-10-CM

## 2020-07-11 LAB
25(OH)D3 SERPL-MCNC: 13.7 NG/ML (ref 30–100)
ESTRADIOL SERPL-MCNC: 60 PG/ML
FSH SERPL-ACNC: 7.5 MIU/ML
LH SERPL-ACNC: 12.7 MIU/ML
PROLACTIN SERPL-MCNC: 20.3 NG/ML
T4 FREE SERPL-MCNC: 0.93 NG/DL (ref 0.76–1.46)
TSH SERPL DL<=0.05 MIU/L-ACNC: 1.9 UIU/ML (ref 0.36–3.74)

## 2020-07-11 PROCEDURE — 82670 ASSAY OF TOTAL ESTRADIOL: CPT

## 2020-07-11 PROCEDURE — 84143 ASSAY OF 17-HYDROXYPREGNENO: CPT

## 2020-07-11 PROCEDURE — 84443 ASSAY THYROID STIM HORMONE: CPT

## 2020-07-11 PROCEDURE — 83835 ASSAY OF METANEPHRINES: CPT

## 2020-07-11 PROCEDURE — 83001 ASSAY OF GONADOTROPIN (FSH): CPT

## 2020-07-11 PROCEDURE — 83002 ASSAY OF GONADOTROPIN (LH): CPT

## 2020-07-11 PROCEDURE — 84146 ASSAY OF PROLACTIN: CPT

## 2020-07-11 PROCEDURE — 82627 DEHYDROEPIANDROSTERONE: CPT

## 2020-07-11 PROCEDURE — 84305 ASSAY OF SOMATOMEDIN: CPT

## 2020-07-11 PROCEDURE — 84403 ASSAY OF TOTAL TESTOSTERONE: CPT

## 2020-07-11 PROCEDURE — 36415 COLL VENOUS BLD VENIPUNCTURE: CPT

## 2020-07-11 PROCEDURE — 84402 ASSAY OF FREE TESTOSTERONE: CPT

## 2020-07-11 PROCEDURE — 84439 ASSAY OF FREE THYROXINE: CPT

## 2020-07-11 PROCEDURE — 82306 VITAMIN D 25 HYDROXY: CPT

## 2020-07-12 LAB — DHEA-S SERPL-MCNC: 31 UG/DL (ref 84.8–378)

## 2020-07-13 LAB
IGF-I SERPL-MCNC: 145 NG/ML (ref 91–308)
TESTOST FREE SERPL-MCNC: 0.9 PG/ML (ref 0–4.2)
TESTOST SERPL-MCNC: 8 NG/DL (ref 8–48)

## 2020-07-14 ENCOUNTER — TELEPHONE (OUTPATIENT)
Dept: ENDOCRINOLOGY | Facility: CLINIC | Age: 30
End: 2020-07-14

## 2020-07-14 NOTE — TELEPHONE ENCOUNTER
Patient called and stated that she went to lab to get the items for the Salivary  Three specimen test, was informed that she need to call her doctor to get the directions

## 2020-07-14 NOTE — TELEPHONE ENCOUNTER
Sent patient a [facility].t message  Salivary cortisol - sample collection  - Do not brush your teeth before collecting the sample  - Do not eat or drink 15 minutes before collecting the sample  - Do not touch the swab with your fingers  - Collect the sample between 11 p m  and midnight     - please do not collect on consecutive days

## 2020-07-15 DIAGNOSIS — N30.90 CYSTITIS: Primary | ICD-10-CM

## 2020-07-15 RX ORDER — NITROFURANTOIN 25; 75 MG/1; MG/1
100 CAPSULE ORAL 2 TIMES DAILY
Qty: 14 CAPSULE | Refills: 0 | Status: SHIPPED | OUTPATIENT
Start: 2020-07-15 | End: 2020-09-14

## 2020-07-16 ENCOUNTER — TELEPHONE (OUTPATIENT)
Dept: OTHER | Facility: HOSPITAL | Age: 30
End: 2020-07-16

## 2020-07-16 DIAGNOSIS — E55.9 VITAMIN D DEFICIENCY: Primary | ICD-10-CM

## 2020-07-16 RX ORDER — ERGOCALCIFEROL 1.25 MG/1
50000 CAPSULE ORAL WEEKLY
Qty: 8 CAPSULE | Refills: 0 | Status: SHIPPED | OUTPATIENT
Start: 2020-07-16 | End: 2020-09-08

## 2020-07-16 NOTE — TELEPHONE ENCOUNTER
Called and discussed her lab results with patient  She still has plasma metanephrines, 20 for urinary cortisol and salivary cortisol tests pending  Component      Latest Ref Rng & Units 7/11/2020   TESTOSTERONE FREE      0 0 - 4 2 pg/mL 0 9   Testosterone, Total, LC/MS      8 - 48 ng/dL 8   ESTRADIOL LEVEL      pg/mL 60 0   INSULIN-LIKE GROWTH FACTOR-1      91 - 308 ng/mL 145   DHEA-SO4      84 8 - 378 0 ug/dL 31 0 (L)   Vit D, 25-Hydroxy      30 0 - 100 0 ng/mL 13 7 (L)   PROLACTIN      ng/mL 20 3   LUTEINIZING HORMONE      mIU/mL 12 7   FSH, POC      mIU/mL 7 5   Free T4      0 76 - 1 46 ng/dL 0 93   TSH 3RD GENERATON      0 358 - 3 740 uIU/mL 1 898     Diagnoses and all orders for this visit:    Vitamin D deficiency  -     ergocalciferol (VITAMIN D2) 50,000 units;  Take 1 capsule (50,000 Units total) by mouth once a week for 8 doses

## 2020-07-17 LAB
17OH-PREG SERPL-MCNC: 125 NG/DL
METANEPH FREE SERPL-MCNC: 13.4 PG/ML (ref 0–88)
NORMETANEPHRINE SERPL-MCNC: 37.6 PG/ML (ref 0–107.7)

## 2020-07-21 ENCOUNTER — SOCIAL WORK (OUTPATIENT)
Dept: BEHAVIORAL/MENTAL HEALTH CLINIC | Facility: CLINIC | Age: 30
End: 2020-07-21
Payer: COMMERCIAL

## 2020-07-21 DIAGNOSIS — Z72.89 SELF-INJURIOUS BEHAVIOR: ICD-10-CM

## 2020-07-21 DIAGNOSIS — F41.1 GAD (GENERALIZED ANXIETY DISORDER): ICD-10-CM

## 2020-07-21 DIAGNOSIS — F33.1 MODERATE EPISODE OF RECURRENT MAJOR DEPRESSIVE DISORDER (HCC): ICD-10-CM

## 2020-07-21 DIAGNOSIS — F50.9 EATING DISORDER, UNSPECIFIED TYPE: Primary | ICD-10-CM

## 2020-07-21 PROCEDURE — 90834 PSYTX W PT 45 MINUTES: CPT | Performed by: SOCIAL WORKER

## 2020-07-21 PROCEDURE — 1036F TOBACCO NON-USER: CPT | Performed by: SOCIAL WORKER

## 2020-07-21 NOTE — PSYCH
Psychotherapy Provided: Individual Psychotherapy 50 minutes     Length of time in session: 50 minutes, follow up in 2 week    Goals addressed in session: Goal 1, Goal 2 and Goal 3      Pain:      none    0    Current suicide risk : Low     D: Met with Catalina individually  Session focused upon anger outbursts increasing - triggers have been residual feelings from session and unconsciously being triggered by comments 'constructive crictism or comments that didn't mean anything '  Brandy Em describes herself 'as really dramatic '  Hitting self, biting, pacing, negative thoughts of self  'I am having outbursts left and right'  Denied Purging, restricting or cutting  Triggers seem to focus around insecurity, historic trauma, and abandonment  Specific historic circumstances processed and validated for triggers however practicing safer ways to respond to feelings  Denied SI    A: Brandy Em presented with depressed mood- passive aggressive comments about herself as a defensive mechanism  P: Continue individual therapy    Behavioral Health Treatment Plan [facility]: Diagnosis and Treatment Plan explained to Cristopher Giron relates understanding diagnosis and is agreeable to Treatment Plan   Yes

## 2020-07-22 ENCOUNTER — PROCEDURE VISIT (OUTPATIENT)
Dept: NEUROLOGY | Facility: CLINIC | Age: 30
End: 2020-07-22
Payer: COMMERCIAL

## 2020-07-22 VITALS — SYSTOLIC BLOOD PRESSURE: 110 MMHG | DIASTOLIC BLOOD PRESSURE: 80 MMHG | TEMPERATURE: 98.7 F | HEART RATE: 75 BPM

## 2020-07-22 DIAGNOSIS — G43.709 CHRONIC MIGRAINE WITHOUT AURA WITHOUT STATUS MIGRAINOSUS, NOT INTRACTABLE: Primary | ICD-10-CM

## 2020-07-22 PROCEDURE — 64615 CHEMODENERV MUSC MIGRAINE: CPT | Performed by: PSYCHIATRY & NEUROLOGY

## 2020-07-22 NOTE — PROGRESS NOTES
Chemodenervation  Date/Time: 7/22/2020 12:04 PM  Performed by: Kely Rebolledo MD  Authorized by: Kely Rebolledo MD     Pre-procedure details:     Prepped With: Alcohol    Anesthesia (see MAR for exact dosages): Anesthesia method:  None  Botox:     Botox Type:  Type A    Brand:  Botox    mL's of Botulinum Toxin:  155    Medication Administration:  100 Units onabotulinumtoxin A 100 units  Procedures:     Botox Procedures: chronic headache      Indications: migraines    Injection Location:     Head / Face:  L frontalis, R frontalis, R , L , L inferior cervical paraspinal, R inferior cervical paraspinal, L superior cervical paraspinal, R superior cervical paraspinal, procerus, L inferior occipitalis, R inferior occipitalis, L inferior trapezius, R inferior trapezius, L temporalis and R temporalis    L  injection amount:  5 unit(s)    R  injection amount:  5 unit(s)    L lateral frontalis:  5 unit(s)    R lateral frontalis:  5 unit(s)    L medial frontalis:  5 unit(s)    R medial frontalis:  5 unit(s)    L temporalis injection amount:  20 unit(s)    R temporalis injection amount:  20 unit(s)    Procerus injection amount:  5 unit(s)    L inferior occipitalis injection amount:  15 unit(s)    R inferior occipitalis injection amount:  15 unit(s)    L inferior cervical paraspinal injection amount:  5 unit(s)    R inferior cervical paraspinal injection amount:  5 unit(s)    L superior cervical paraspinal injection amount:  5 unit(s)    R superior cervical paraspinal injection amount:  5 unit(s)    L inferior trapezius injection amount:  15 unit(s)    R inferior trapezius injection amount:  15 unit(s)  Total Units:     Total units used:  155    Total units discarded:  45  Post-procedure details:     Chemodenervation:  Chronic migraine    Facial Nerve Location[de-identified]  Bilateral facial nerve    Patient tolerance of procedure:   Tolerated well, no immediate complications

## 2020-07-24 DIAGNOSIS — F33.1 MODERATE EPISODE OF RECURRENT MAJOR DEPRESSIVE DISORDER (HCC): ICD-10-CM

## 2020-07-24 RX ORDER — DULOXETIN HYDROCHLORIDE 60 MG/1
60 CAPSULE, DELAYED RELEASE ORAL DAILY
Qty: 90 CAPSULE | Refills: 0 | Status: SHIPPED | OUTPATIENT
Start: 2020-07-24 | End: 2020-08-11 | Stop reason: SDUPTHER

## 2020-07-24 RX ORDER — DULOXETIN HYDROCHLORIDE 60 MG/1
60 CAPSULE, DELAYED RELEASE ORAL DAILY
Qty: 90 CAPSULE | Refills: 0 | Status: SHIPPED | OUTPATIENT
Start: 2020-07-24 | End: 2020-07-24 | Stop reason: SDUPTHER

## 2020-07-25 ENCOUNTER — APPOINTMENT (OUTPATIENT)
Dept: LAB | Facility: CLINIC | Age: 30
End: 2020-07-25
Payer: COMMERCIAL

## 2020-07-25 DIAGNOSIS — E28.2 PCOS (POLYCYSTIC OVARIAN SYNDROME): ICD-10-CM

## 2020-07-25 LAB — CORTIS AM PEAK SERPL-MCNC: 19.1 UG/DL (ref 4.2–22.4)

## 2020-07-25 PROCEDURE — 82533 TOTAL CORTISOL: CPT

## 2020-07-25 PROCEDURE — 36415 COLL VENOUS BLD VENIPUNCTURE: CPT

## 2020-07-31 ENCOUNTER — TELEPHONE (OUTPATIENT)
Dept: OTHER | Facility: HOSPITAL | Age: 30
End: 2020-07-31

## 2020-07-31 LAB
CORTIS BS SAL-MCNC: 0.04 UG/DL
CORTIS SP1 P CHAL SAL-MCNC: 0.02 UG/DL
CORTIS SP2 P CHAL SAL-MCNC: 0.01 UG/DL

## 2020-07-31 NOTE — TELEPHONE ENCOUNTER
Called patient to review labs - salivary cortisol    Results for Jocelyne Kraft (MRN [id number]) as of 7/31/2020 11:50   Ref  Range 7/25/2020 08:48   1 SALIVARY CORTISOL Latest Units: ug/dL 0 040   2 SALIVARY CORTISOL Latest Units: ug/dL 0 016   3 SALIVARY CORTISOL Latest Units: ug/dL 0 013     30yr female with Hisrsutism and Hyperhydrosis with background Hx of migraines, RA and Ankylosing spondylitis  W/u negative for hyperandrogenism, Cushings, acromegaly  and pheochromocytoma  24 hr urine cortisol pending

## 2020-08-10 ENCOUNTER — TELEPHONE (OUTPATIENT)
Dept: OBGYN CLINIC | Facility: CLINIC | Age: 30
End: 2020-08-10

## 2020-08-10 PROBLEM — K62.89 ANAL PAIN: Status: ACTIVE | Noted: 2020-08-10

## 2020-08-10 NOTE — TELEPHONE ENCOUNTER
Left message on nurse line states she wants to discuss her symptoms  RC left message on voicemail - advised we did see message on Mychart - provider recommends she call and schedule appt

## 2020-08-11 ENCOUNTER — OFFICE VISIT (OUTPATIENT)
Dept: PSYCHIATRY | Facility: CLINIC | Age: 30
End: 2020-08-11
Payer: COMMERCIAL

## 2020-08-11 DIAGNOSIS — F50.9 EATING DISORDER, UNSPECIFIED TYPE: ICD-10-CM

## 2020-08-11 DIAGNOSIS — F33.1 MODERATE EPISODE OF RECURRENT MAJOR DEPRESSIVE DISORDER (HCC): ICD-10-CM

## 2020-08-11 DIAGNOSIS — Z72.89 SELF-INJURIOUS BEHAVIOR: ICD-10-CM

## 2020-08-11 DIAGNOSIS — F41.1 GAD (GENERALIZED ANXIETY DISORDER): Primary | ICD-10-CM

## 2020-08-11 PROCEDURE — 90833 PSYTX W PT W E/M 30 MIN: CPT | Performed by: PSYCHIATRY & NEUROLOGY

## 2020-08-11 PROCEDURE — 1036F TOBACCO NON-USER: CPT | Performed by: PSYCHIATRY & NEUROLOGY

## 2020-08-11 PROCEDURE — 99214 OFFICE O/P EST MOD 30 MIN: CPT | Performed by: PSYCHIATRY & NEUROLOGY

## 2020-08-11 RX ORDER — DULOXETIN HYDROCHLORIDE 30 MG/1
30 CAPSULE, DELAYED RELEASE ORAL
Qty: 90 CAPSULE | Refills: 1 | Status: SHIPPED | OUTPATIENT
Start: 2020-08-11 | End: 2020-11-02 | Stop reason: SDUPTHER

## 2020-08-11 RX ORDER — BUSPIRONE HYDROCHLORIDE 7.5 MG/1
15 TABLET ORAL 2 TIMES DAILY
Qty: 180 TABLET | Refills: 1 | Status: SHIPPED | OUTPATIENT
Start: 2020-08-11 | End: 2020-11-02 | Stop reason: SDUPTHER

## 2020-08-11 RX ORDER — DULOXETIN HYDROCHLORIDE 60 MG/1
60 CAPSULE, DELAYED RELEASE ORAL DAILY
Qty: 90 CAPSULE | Refills: 1 | Status: SHIPPED | OUTPATIENT
Start: 2020-08-11 | End: 2020-11-02 | Stop reason: SDUPTHER

## 2020-08-11 RX ORDER — LAMOTRIGINE 25 MG/1
25 TABLET ORAL DAILY
Qty: 60 TABLET | Refills: 0 | Status: SHIPPED | OUTPATIENT
Start: 2020-08-11 | End: 2020-09-11

## 2020-08-11 NOTE — PATIENT INSTRUCTIONS
SHIRLEY (generalized anxiety disorder)  -     DULoxetine (CYMBALTA) 30 mg delayed release capsule; Take 1 capsule (30 mg total) by mouth daily at bedtime  -     lamoTRIgine (LaMICtal) 25 mg tablet; Take 1 tablet (25 mg total) by mouth daily For 2 weeks, then increase to 50mg daily for 2 weeks, then increase to 100mg    Eating disorder, unspecified type    Self-injurious behavior  -     lamoTRIgine (LaMICtal) 25 mg tablet; Take 1 tablet (25 mg total) by mouth daily For 2 weeks, then increase to 50mg daily for 2 weeks, then increase to 100mg    Moderate episode of recurrent major depressive disorder (HCC)  -     DULoxetine (CYMBALTA) 60 mg delayed release capsule; Take 1 capsule (60 mg total) by mouth daily  -     DULoxetine (CYMBALTA) 30 mg delayed release capsule; Take 1 capsule (30 mg total) by mouth daily at bedtime  -     busPIRone (BUSPAR) 7 5 mg tablet; Take 2 tablets (15 mg total) by mouth 2 (two) times a day  -     lamoTRIgine (LaMICtal) 25 mg tablet; Take 1 tablet (25 mg total) by mouth daily For 2 weeks, then increase to 50mg daily for 2 weeks, then increase to 100mg          Continue therapy every 2 weeks  Follow up in 4-5 weeks
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
## 2020-08-12 ENCOUNTER — TELEPHONE (OUTPATIENT)
Dept: PSYCHIATRY | Facility: CLINIC | Age: 30
End: 2020-08-12

## 2020-08-12 NOTE — TELEPHONE ENCOUNTER
Received fax from [employer] for clarification on the Lamotrigine 25 mg tabs # 60       Form completed and faxed to     Will place in bin for scanning

## 2020-08-14 ENCOUNTER — TELEPHONE (OUTPATIENT)
Dept: PSYCHIATRY | Facility: CLINIC | Age: 30
End: 2020-08-14

## 2020-08-14 DIAGNOSIS — E55.9 VITAMIN D DEFICIENCY: Primary | ICD-10-CM

## 2020-08-18 ENCOUNTER — SOCIAL WORK (OUTPATIENT)
Dept: BEHAVIORAL/MENTAL HEALTH CLINIC | Facility: CLINIC | Age: 30
End: 2020-08-18
Payer: COMMERCIAL

## 2020-08-18 DIAGNOSIS — F41.1 GAD (GENERALIZED ANXIETY DISORDER): ICD-10-CM

## 2020-08-18 DIAGNOSIS — Z72.89 SELF-INJURIOUS BEHAVIOR: ICD-10-CM

## 2020-08-18 DIAGNOSIS — F33.1 MODERATE EPISODE OF RECURRENT MAJOR DEPRESSIVE DISORDER (HCC): ICD-10-CM

## 2020-08-18 DIAGNOSIS — F50.9 EATING DISORDER, UNSPECIFIED TYPE: Primary | ICD-10-CM

## 2020-08-18 PROCEDURE — 90834 PSYTX W PT 45 MINUTES: CPT | Performed by: SOCIAL WORKER
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
## 2020-08-27 ENCOUNTER — TELEPHONE (OUTPATIENT)
Dept: RHEUMATOLOGY | Facility: CLINIC | Age: 30
End: 2020-08-27

## 2020-08-28 NOTE — TELEPHONE ENCOUNTER
Patient would like you to mail the documents for her to complete  She will forward a completed copy to you the same way she originally sent the documents  I confirmed the correct address is on file      [address] [address]

## 2020-09-01 ENCOUNTER — SOCIAL WORK (OUTPATIENT)
Dept: BEHAVIORAL/MENTAL HEALTH CLINIC | Facility: CLINIC | Age: 30
End: 2020-09-01
Payer: COMMERCIAL

## 2020-09-01 ENCOUNTER — TELEPHONE (OUTPATIENT)
Dept: NEUROLOGY | Facility: CLINIC | Age: 30
End: 2020-09-01

## 2020-09-01 DIAGNOSIS — F50.9 EATING DISORDER, UNSPECIFIED TYPE: ICD-10-CM

## 2020-09-01 DIAGNOSIS — Z72.89 SELF-INJURIOUS BEHAVIOR: ICD-10-CM

## 2020-09-01 DIAGNOSIS — F33.1 MODERATE EPISODE OF RECURRENT MAJOR DEPRESSIVE DISORDER (HCC): ICD-10-CM

## 2020-09-01 DIAGNOSIS — F41.1 GAD (GENERALIZED ANXIETY DISORDER): Primary | ICD-10-CM

## 2020-09-01 PROCEDURE — 90834 PSYTX W PT 45 MINUTES: CPT | Performed by: SOCIAL WORKER

## 2020-09-01 NOTE — TELEPHONE ENCOUNTER
General  09/01/2020  1:48 PM  Jai Rainey MA  CARE COORDINATION  -    Note     BOTOX 200 UNITS (VISIT# 2) - AUTH# [id number]  4 VISITS, AV- 06/15/2020 TILL 06/15/2021    [employer]

## 2020-09-03 ENCOUNTER — TELEPHONE (OUTPATIENT)
Dept: ENDOCRINOLOGY | Facility: CLINIC | Age: 30
End: 2020-09-03

## 2020-09-03 NOTE — TELEPHONE ENCOUNTER
Covering for Dr Sirena Cramer--- please let pt know the vitamin D level was low at 13 7- did she start taking vitamin D supplements?    -the DHEAS is low but that does not cause night sweats

## 2020-09-03 NOTE — TELEPHONE ENCOUNTER
----- Message from Jessica Srinivasan MA sent at 9/3/2020  8:07 AM EDT -----  Regarding: FW: Instructions on collection of salivary cortisol sample  Contact: [phone]  See patient request below  ----- Message -----  From: Severo Simas  Sent: 9/2/2020  11:09 PM EDT  To: , #  Subject: RE: Instructions on collection of salivary c#    Hello,    I have not heard back about what my test results for one of those means  I had two return with low result and one being the vitamin D which I have been taking  However there's one that says DHE (I think that's what it says I can't remember and it won't let me go back sorry!) level is low  I am still having nights sweats  ----- Message -----  From: Arben Esqueda MD  Sent: 7/15/20, 12:55 PM  To: Severo Simas  Subject: RE: Instructions on collection of salivary cortisol sample    Jeff Arnold,     Yes, alternate day collection would be ok  You do not need to freeze the samples unless the lab instructed you to, usually it is sufficient to keep them in the refrigerator  There should be a container/test-tube that they asked you to place the sample in  Marie Pelham           ----- Message -----     From: Severo Simas     Sent: 7/14/2020  4:14 PM EDT       To: Arben Esqueda MD  Subject: RE: Instructions on collection of salivary cortisol sample    Thank you! To clarify, I should do them every other day? Must I freeze the other samples once done?     ----- Message -----  From: Arben Esqueda MD  Sent: 7/14/20, 11:36 AM  To: Severo Simas  Subject: Instructions on collection of salivary cortisol sample     Jeff Arnold,     Please follow these instructions for collecting the salivary cortisol sample    Salivary cortisol - sample collection  - Do not brush your teeth before collecting the sample  - Do not eat or drink 15 minutes before collecting the sample  - Do not touch the swab with your fingers  - Collect the sample between 11 p m  and midnight     - please do not collect on consecutive days     The please feel free to give us a call if you have any further questions  I will get back to you once the results are available       Sincerely,   Alix Gómez MD  Endocrinology

## 2020-09-05 DIAGNOSIS — E55.9 VITAMIN D DEFICIENCY: ICD-10-CM

## 2020-09-08 ENCOUNTER — TELEPHONE (OUTPATIENT)
Dept: ENDOCRINOLOGY | Facility: CLINIC | Age: 30
End: 2020-09-08

## 2020-09-08 RX ORDER — ERGOCALCIFEROL 1.25 MG/1
50000 CAPSULE ORAL WEEKLY
Qty: 8 CAPSULE | Refills: 0 | Status: ON HOLD | OUTPATIENT
Start: 2020-09-08 | End: 2020-12-23

## 2020-09-08 NOTE — TELEPHONE ENCOUNTER
----- Message from Mike Anders MA sent at 9/8/2020  8:21 AM EDT -----  Regarding: FW: Lab Results  Contact: [phone]  Please see note that patient is taking the Vitamin D     ----- Message -----  From: Olympia Kussmaul  Sent: 9/5/2020  10:46 PM EDT  To: , #  Subject: Lab Results                                      Hello,    I have started the vitamin D, yes  I was unsure about the other result  Thank you!    ----- Message -----  From: Linnea Webb MA  Sent: 9/4/20, 10:34 AM  To: Olympia Kussmaul  Subject: Lab Results    [facility],    In response to your vitamin d and [facility] lab results:    Dr Kimmie Chanel stated the following:    vitamin D level was low at 13 7- did she start taking vitamin D supplements?     -the DHEAS is low but that does not cause night sweats    Alvaro Lanier   [facility]  Registered/Certified Medical Assistant  [facility]  [address]  IQXOF:[phone]  Fax: [phone]

## 2020-09-08 NOTE — TELEPHONE ENCOUNTER
Covering for Dr Kathy Carreno, what is the dose of the vitamin D she is taking? And for how long has she been on it for?

## 2020-09-10 DIAGNOSIS — F41.1 GAD (GENERALIZED ANXIETY DISORDER): ICD-10-CM

## 2020-09-10 DIAGNOSIS — Z72.89 SELF-INJURIOUS BEHAVIOR: ICD-10-CM

## 2020-09-10 DIAGNOSIS — F33.1 MODERATE EPISODE OF RECURRENT MAJOR DEPRESSIVE DISORDER (HCC): ICD-10-CM

## 2020-09-11 RX ORDER — LAMOTRIGINE 100 MG/1
100 TABLET ORAL DAILY
Qty: 30 TABLET | Refills: 1 | Status: SHIPPED | OUTPATIENT
Start: 2020-09-11 | End: 2020-10-19

## 2020-09-14 ENCOUNTER — OFFICE VISIT (OUTPATIENT)
Dept: RHEUMATOLOGY | Facility: CLINIC | Age: 30
End: 2020-09-14
Payer: COMMERCIAL

## 2020-09-14 VITALS
DIASTOLIC BLOOD PRESSURE: 88 MMHG | WEIGHT: 184 LBS | TEMPERATURE: 98.7 F | SYSTOLIC BLOOD PRESSURE: 132 MMHG | BODY MASS INDEX: 31.58 KG/M2 | HEART RATE: 97 BPM

## 2020-09-14 DIAGNOSIS — Z79.899 LONG TERM USE OF DRUG: ICD-10-CM

## 2020-09-14 DIAGNOSIS — M79.7 FIBROMYALGIA: ICD-10-CM

## 2020-09-14 DIAGNOSIS — M76.62 ACHILLES TENDINITIS OF LEFT LOWER EXTREMITY: ICD-10-CM

## 2020-09-14 DIAGNOSIS — M06.09 RHEUMATOID ARTHRITIS OF MULTIPLE SITES WITH NEGATIVE RHEUMATOID FACTOR (HCC): Primary | ICD-10-CM

## 2020-09-14 PROCEDURE — 99214 OFFICE O/P EST MOD 30 MIN: CPT | Performed by: INTERNAL MEDICINE

## 2020-09-14 RX ORDER — METHYLPREDNISOLONE 4 MG/1
TABLET ORAL
Qty: 21 TABLET | Refills: 0 | Status: SHIPPED | OUTPATIENT
Start: 2020-09-14 | End: 2020-10-05

## 2020-09-14 NOTE — PROGRESS NOTES
Assessment and Plan:   Patient is a 28-year-old female who presents for rheumatology follow-up for seronegative RA versus seronegative spondyloarthropathy  She has carried this diagnosis for quite some time, although I have never appreciated overt inflammatory arthritis on her office exams  She continues to report improvement while on the Humira and so we will continue with current therapy  Her left heel pain seems localized at the entheseal site of the Achilles attachment onto the calcaneus  We discussed possibly she has Achilles tendonitis versus enthesitis  We will do a Medrol Dosepak to see if this will resolve this but we discussed if it does not I can refer her to Ankle Orthopedics for further evaluation  She was agreeable with that plan  She will get routine blood work for her Humira before our next visit      Plan:  Diagnoses and all orders for this visit:    Rheumatoid arthritis of multiple sites with negative rheumatoid factor (HCC)  -     methylPREDNISolone 4 MG tablet therapy pack; Use as directed on package    Long term use of drug  -     CBC and differential  -     Comprehensive metabolic panel  -     C-reactive protein  -     Sedimentation rate, automated    Fibromyalgia    Achilles tendinitis of left lower extremity  -     methylPREDNISolone 4 MG tablet therapy pack; Use as directed on package        Follow-up plan: 6 months       Rheumatic Disease Summary:  1  Prior dx of Ankylosing spondylitis vs seronegative RA  -Multiple prior rheumatologists including Karen Davidson, Dr Bre Stinson [facility]), then est with Dr Riddhi Gold 7/2015 for previous dx of HLA-B27 negative AS, Sjogrens, and fibromyalgia; cousin with AS, mom with RA    -Lost to f/u with [facility] since 4/2016, then followed with [facility] rheum (did not think she had AS, possibly seroneg, thought mainly fibromyalgia), now returning to [facility] with me on 2/25/19  -Labs 7/2017: negative HLA-B27, RF, CCP, DORON, dsDNA, SSA, SSB, smith, RNP, C3/4, Hep B/C  -XR SI/lumbar 8/2016: mild facet arthropathy, normal SI with erosions or ankylosis   -XR hands/feet 6/2017: normal, no erosions   -MRI cervical spine 8/2017: normal   -Meds history:    -Multiple NSAIDs (nabumetone, meloxicam, naproxen, diclofenac, daypro, lodine)    -Sulfasalazine    -Oral MTX 15mg/week (2015-4/2016)    -Humira (9/2015-4/2016, effective but afraid needles)    -Otezla (2016, D/C due to vomiting and depression)    -Simponi Aria (2016, x6 months, then stopped working)    -Xeljanz for ?seroneg RA ([facility]) (1/2018-2/2019, ineffective)  -Visit 2/25/19: no active synovitis to suggest RA, however patient insistent on stopping xeljanz and going back on humira; exam most notable for fibromyalgia  -Humira started 5/2019 with improvement in generalized pain    -Visit 12/3/19: stable on humira, no changes   -Visit 9/14/20: achilles tendonitis, medrol dosepack, cont humira   2  Fibromyalgia   -Lyrica beneficial   -Suggested discussing with psych about switching to Cymbalta  3  Drug toxicity monitoring   4  Comorbidities: migraines, small right supraspinatus tear, anxiety, depression     HPI  Tr Mcmanus is a 35 y o   female who presents for rheumatology follow-up regarding seronegative RA  Patient reports she is overall doing about the same  She is still on the Humira and feels this is working well  She still has all of her normal daily joint pain  Patient reports morning stiffness in her hips, knees, ankles, low back  Resolves after 5-10 minutes  Her most recent issue in the past few weeks has been pain at her posterior left heel and she feels like it is on the bone of the heel  She has not noticed swelling there  No specific injury  She has milder pain in the same location on the right but it is much worse on the left  No recent swollen joints      The following portions of the patient's history were reviewed and updated as appropriate: allergies, current medications, past family history, past medical history, past social history, past surgical history and problem list     Review of Systems:   Review of Systems   Constitutional: Negative for fatigue and unexpected weight change  HENT: Negative for mouth sores  Respiratory: Negative for cough and shortness of breath  Gastrointestinal: Negative for constipation and diarrhea  Musculoskeletal: Positive for arthralgias and back pain  Negative for joint swelling and myalgias  Skin: Negative for color change and rash  Neurological: Negative for weakness  Psychiatric/Behavioral: Negative for sleep disturbance         Home Medications:     Current Outpatient Medications:     Adalimumab (Humira Pen) 40 MG/0 8ML PNKT, Inject 0 8 mL (40 mg total) under the skin every 14 (fourteen) days, Disp: 5 each, Rfl: 3    BOTOX 100 units, INJECT UP  UNITS INTO VARIOUS HEAD AND NECK MUSCLES EVERY 3 MONTHS , Disp: 2 each, Rfl: 0    busPIRone (BUSPAR) 7 5 mg tablet, Take 2 tablets (15 mg total) by mouth 2 (two) times a day, Disp: 180 tablet, Rfl: 1    Clindamycin Phos-Benzoyl Perox gel, EVERY NIGHT AT BEDTIME TO FACE ACNE  KEEP IN THE REFRIGERATOR, Disp: , Rfl:     DULoxetine (CYMBALTA) 30 mg delayed release capsule, Take 1 capsule (30 mg total) by mouth daily at bedtime, Disp: 90 capsule, Rfl: 1    DULoxetine (CYMBALTA) 60 mg delayed release capsule, Take 1 capsule (60 mg total) by mouth daily, Disp: 90 capsule, Rfl: 1    ergocalciferol (VITAMIN D2) 50,000 units, TAKE 1 CAPSULE (50,000 UNITS TOTAL) BY MOUTH ONCE A WEEK FOR 8 DOSES, Disp: 8 capsule, Rfl: 0    lamoTRIgine (LaMICtal) 100 mg tablet, Take 1 tablet (100 mg total) by mouth daily Take as instructed by your prescriber, Disp: 30 tablet, Rfl: 1    levonorgestrel (MIRENA) 20 MCG/24HR IUD, 1 each by Intrauterine route once, Disp: , Rfl:     methylPREDNISolone 4 MG tablet therapy pack, Use as directed on package, Disp: 21 tablet, Rfl: 0    pregabalin (LYRICA) 150 mg capsule, Take 1 capsule (150 mg total) by mouth 2 (two) times a day, Disp: 180 capsule, Rfl: 0    Current Facility-Administered Medications:     Botulinum Toxin Type A SOLR 200 Units, 200 Units, Injection, Q3 Months, Carolina Rice MD, 200 Units at 01/28/20 1613    Botulinum Toxin Type A SOLR 200 Units, 200 Units, Injection, Q3 Months, Carolina Rice MD, 200 Units at 04/30/20 1636    Botulinum Toxin Type A SOLR 200 Units, 200 Units, Injection, Q3 Months, Carolina Rice MD, 200 Units at 07/22/20 1224    Objective:    Vitals:    09/14/20 1559   BP: 132/88   Pulse: 97   Temp: 98 7 °F (37 1 °C)   Weight: 83 5 kg (184 lb)       Physical Exam  Constitutional:       General: She is not in acute distress  Appearance: She is well-developed  HENT:      Head: Normocephalic and atraumatic  Eyes:      General: Lids are normal  No scleral icterus  Conjunctiva/sclera: Conjunctivae normal    Neck:      Musculoskeletal: Neck supple  Pulmonary:      Effort: Pulmonary effort is normal  No tachypnea, accessory muscle usage or respiratory distress  Musculoskeletal:      Comments: No joint swelling or synovitis anywhere  Pain localized to the posterior left Achilles attachment onto the calcaneus  No significant swelling or warmth  No other significant joint pain  Skin:     General: Skin is dry  Findings: No rash  Neurological:      Mental Status: She is alert  Psychiatric:         Behavior: Behavior normal  Behavior is cooperative         Musculoskeletal--Peripheral Joint Exam:  Physical Exam     Swelling:   LLE: tibiotalar    ANAYA-28 tender joint count: 0  ANAYA-28 swollen joint count: 0

## 2020-09-15 ENCOUNTER — TELEPHONE (OUTPATIENT)
Dept: OTHER | Facility: OTHER | Age: 30
End: 2020-09-15

## 2020-09-15 NOTE — TELEPHONE ENCOUNTER
PT is cancelling appt for 9/16 due to work    PT would like a call back she received a letter stating new location and number but number is diconnected

## 2020-09-16 ENCOUNTER — TELEPHONE (OUTPATIENT)
Dept: PSYCHIATRY | Facility: CLINIC | Age: 30
End: 2020-09-16

## 2020-09-17 ENCOUNTER — TRANSCRIBE ORDERS (OUTPATIENT)
Dept: RHEUMATOLOGY | Facility: CLINIC | Age: 30
End: 2020-09-17

## 2020-09-24 ENCOUNTER — TELEPHONE (OUTPATIENT)
Dept: ENDOCRINOLOGY | Facility: CLINIC | Age: 30
End: 2020-09-24

## 2020-09-24 NOTE — TELEPHONE ENCOUNTER
----- Message from Zahira Higuera MA sent at 9/24/2020 12:50 PM EDT -----  Regarding: FW: Question about Vitamin D Supplement  Contact: [phone]  Marie Balderas,    Please see message below from patient    ----- Message -----  From: Kun Bejarano  Sent: 9/24/2020   7:49 AM EDT  To: , #  Subject: RE: Question about Vitamin D Supplement          Hello he is the one who prescribed me with it and I've Been taking it ever since he gave it to me  I am not sure the milligrams  I've been on it for maybe a month and a half now    ----- Message -----  From: Zahira Higuera MA  Sent: 9/14/20, 2:21 PM  To: Kun Bejarano  Subject: Question about Vitamin Arcadio Slate,    Dr Hailee Balderas would like to know what is the dose of the vitamin D you are taking? And for how long have you been  on it for? Gerald Whalen  Registered/Certified Medical Assistant  [facility]  [facility] [employer] [address] [place] [address]  QNHXS:[phone]  Fax: [phone]

## 2020-09-24 NOTE — TELEPHONE ENCOUNTER
Covering for Dr Richard Johnson    -I have never met the patient before    I think she is referring to Dr Roopa Borrero who prescribed Vitamin D2 50,000 IU weekly    Continue as prescribed

## 2020-09-25 DIAGNOSIS — M79.7 FIBROMYALGIA: ICD-10-CM

## 2020-09-25 RX ORDER — PREGABALIN 150 MG/1
150 CAPSULE ORAL 2 TIMES DAILY
Qty: 180 CAPSULE | Refills: 0 | Status: SHIPPED | OUTPATIENT
Start: 2020-09-25 | End: 2020-12-21

## 2020-10-01 ENCOUNTER — SOCIAL WORK (OUTPATIENT)
Dept: BEHAVIORAL/MENTAL HEALTH CLINIC | Facility: CLINIC | Age: 30
End: 2020-10-01
Payer: COMMERCIAL

## 2020-10-01 DIAGNOSIS — F33.1 MODERATE EPISODE OF RECURRENT MAJOR DEPRESSIVE DISORDER (HCC): ICD-10-CM

## 2020-10-01 DIAGNOSIS — F50.9 EATING DISORDER, UNSPECIFIED TYPE: ICD-10-CM

## 2020-10-01 DIAGNOSIS — F41.1 GAD (GENERALIZED ANXIETY DISORDER): Primary | ICD-10-CM

## 2020-10-01 DIAGNOSIS — Z72.89 SELF-INJURIOUS BEHAVIOR: ICD-10-CM

## 2020-10-01 PROCEDURE — 90834 PSYTX W PT 45 MINUTES: CPT | Performed by: SOCIAL WORKER

## 2020-10-05 ENCOUNTER — ANNUAL EXAM (OUTPATIENT)
Dept: OBGYN CLINIC | Facility: CLINIC | Age: 30
End: 2020-10-05
Payer: COMMERCIAL

## 2020-10-05 VITALS
HEIGHT: 64 IN | TEMPERATURE: 97.9 F | SYSTOLIC BLOOD PRESSURE: 122 MMHG | DIASTOLIC BLOOD PRESSURE: 74 MMHG | WEIGHT: 181 LBS | BODY MASS INDEX: 30.9 KG/M2

## 2020-10-05 DIAGNOSIS — Z01.419 WOMEN'S ANNUAL ROUTINE GYNECOLOGICAL EXAMINATION: Primary | ICD-10-CM

## 2020-10-05 DIAGNOSIS — Z30.431 IUD CHECK UP: ICD-10-CM

## 2020-10-05 PROCEDURE — S0612 ANNUAL GYNECOLOGICAL EXAMINA: HCPCS | Performed by: OBSTETRICS & GYNECOLOGY

## 2020-10-08 ENCOUNTER — SOCIAL WORK (OUTPATIENT)
Dept: BEHAVIORAL/MENTAL HEALTH CLINIC | Facility: CLINIC | Age: 30
End: 2020-10-08
Payer: COMMERCIAL

## 2020-10-08 DIAGNOSIS — Z72.89 SELF-INJURIOUS BEHAVIOR: ICD-10-CM

## 2020-10-08 DIAGNOSIS — F41.1 GAD (GENERALIZED ANXIETY DISORDER): ICD-10-CM

## 2020-10-08 DIAGNOSIS — F50.9 EATING DISORDER, UNSPECIFIED TYPE: Primary | ICD-10-CM

## 2020-10-08 DIAGNOSIS — F33.1 MODERATE EPISODE OF RECURRENT MAJOR DEPRESSIVE DISORDER (HCC): ICD-10-CM

## 2020-10-08 PROCEDURE — 90834 PSYTX W PT 45 MINUTES: CPT | Performed by: SOCIAL WORKER

## 2020-10-09 DIAGNOSIS — M72.2 PLANTAR FASCIITIS: Primary | ICD-10-CM

## 2020-10-12 NOTE — TELEPHONE ENCOUNTER
Called [facility] and spoke to Lucrecia - Botox delivery confirmed for Wednesday 10/14/20 via [employer] priority overnight to [place] location  Please await Botox delivery  Thank you!     Makenzie

## 2020-10-13 ENCOUNTER — OFFICE VISIT (OUTPATIENT)
Dept: OBGYN CLINIC | Facility: CLINIC | Age: 30
End: 2020-10-13
Payer: COMMERCIAL

## 2020-10-13 VITALS
TEMPERATURE: 97.9 F | HEIGHT: 64 IN | DIASTOLIC BLOOD PRESSURE: 76 MMHG | SYSTOLIC BLOOD PRESSURE: 124 MMHG | BODY MASS INDEX: 32.3 KG/M2 | WEIGHT: 189.2 LBS

## 2020-10-13 DIAGNOSIS — N76.1 CHRONIC VAGINITIS: ICD-10-CM

## 2020-10-13 DIAGNOSIS — B37.3 VULVOVAGINAL CANDIDIASIS: Primary | ICD-10-CM

## 2020-10-13 PROCEDURE — 1036F TOBACCO NON-USER: CPT | Performed by: OBSTETRICS & GYNECOLOGY

## 2020-10-13 PROCEDURE — 87070 CULTURE OTHR SPECIMN AEROBIC: CPT | Performed by: OBSTETRICS & GYNECOLOGY

## 2020-10-13 PROCEDURE — 99214 OFFICE O/P EST MOD 30 MIN: CPT | Performed by: OBSTETRICS & GYNECOLOGY

## 2020-10-13 PROCEDURE — 87106 FUNGI IDENTIFICATION YEAST: CPT | Performed by: OBSTETRICS & GYNECOLOGY

## 2020-10-13 RX ORDER — FLUCONAZOLE 150 MG/1
150 TABLET ORAL
Qty: 3 TABLET | Refills: 0 | Status: SHIPPED | OUTPATIENT
Start: 2020-10-13 | End: 2020-10-20

## 2020-10-14 NOTE — TELEPHONE ENCOUNTER
Botox number of units: 100  Botox quantity: 2  Arrived at what location: [facility]  Lot number: [id number]  Expiration Date: 06/2023

## 2020-10-17 LAB
BACTERIA GENITAL AEROBE CULT: ABNORMAL
BACTERIA GENITAL AEROBE CULT: ABNORMAL

## 2020-10-19 DIAGNOSIS — Z72.89 SELF-INJURIOUS BEHAVIOR: ICD-10-CM

## 2020-10-19 DIAGNOSIS — F33.1 MODERATE EPISODE OF RECURRENT MAJOR DEPRESSIVE DISORDER (HCC): ICD-10-CM

## 2020-10-19 DIAGNOSIS — F41.1 GAD (GENERALIZED ANXIETY DISORDER): ICD-10-CM

## 2020-10-19 RX ORDER — LAMOTRIGINE 100 MG/1
TABLET ORAL
Qty: 90 TABLET | Refills: 1 | Status: SHIPPED | OUTPATIENT
Start: 2020-10-19 | End: 2020-11-02 | Stop reason: SDUPTHER

## 2020-10-21 DIAGNOSIS — B37.3 VULVOVAGINAL CANDIDIASIS: Primary | ICD-10-CM

## 2020-10-21 RX ORDER — FLUCONAZOLE 150 MG/1
TABLET ORAL
Qty: 15 TABLET | Refills: 0 | Status: SHIPPED | OUTPATIENT
Start: 2020-10-21 | End: 2020-11-02

## 2020-10-27 ENCOUNTER — SOCIAL WORK (OUTPATIENT)
Dept: BEHAVIORAL/MENTAL HEALTH CLINIC | Facility: CLINIC | Age: 30
End: 2020-10-27
Payer: COMMERCIAL

## 2020-10-27 DIAGNOSIS — Z72.89 SELF-INJURIOUS BEHAVIOR: ICD-10-CM

## 2020-10-27 DIAGNOSIS — F41.1 GAD (GENERALIZED ANXIETY DISORDER): ICD-10-CM

## 2020-10-27 DIAGNOSIS — F33.1 MODERATE EPISODE OF RECURRENT MAJOR DEPRESSIVE DISORDER (HCC): ICD-10-CM

## 2020-10-27 DIAGNOSIS — F50.9 EATING DISORDER, UNSPECIFIED TYPE: Primary | ICD-10-CM

## 2020-10-27 PROCEDURE — 90834 PSYTX W PT 45 MINUTES: CPT | Performed by: SOCIAL WORKER

## 2020-10-28 ENCOUNTER — TELEPHONE (OUTPATIENT)
Dept: GASTROENTEROLOGY | Facility: CLINIC | Age: 30
End: 2020-10-28

## 2020-11-02 ENCOUNTER — TELEPHONE (OUTPATIENT)
Dept: GASTROENTEROLOGY | Facility: AMBULARY SURGERY CENTER | Age: 30
End: 2020-11-02

## 2020-11-02 ENCOUNTER — PROCEDURE VISIT (OUTPATIENT)
Dept: NEUROLOGY | Facility: CLINIC | Age: 30
End: 2020-11-02
Payer: COMMERCIAL

## 2020-11-02 ENCOUNTER — TELEMEDICINE (OUTPATIENT)
Dept: PSYCHIATRY | Facility: CLINIC | Age: 30
End: 2020-11-02
Payer: COMMERCIAL

## 2020-11-02 VITALS
SYSTOLIC BLOOD PRESSURE: 110 MMHG | HEIGHT: 64 IN | TEMPERATURE: 97.7 F | DIASTOLIC BLOOD PRESSURE: 78 MMHG | WEIGHT: 188.7 LBS | BODY MASS INDEX: 32.21 KG/M2

## 2020-11-02 DIAGNOSIS — F41.1 GAD (GENERALIZED ANXIETY DISORDER): ICD-10-CM

## 2020-11-02 DIAGNOSIS — G43.709 CHRONIC MIGRAINE WITHOUT AURA WITHOUT STATUS MIGRAINOSUS, NOT INTRACTABLE: Primary | ICD-10-CM

## 2020-11-02 DIAGNOSIS — Z72.89 SELF-INJURIOUS BEHAVIOR: ICD-10-CM

## 2020-11-02 DIAGNOSIS — F33.1 MODERATE EPISODE OF RECURRENT MAJOR DEPRESSIVE DISORDER (HCC): ICD-10-CM

## 2020-11-02 DIAGNOSIS — F50.9 EATING DISORDER, UNSPECIFIED TYPE: Primary | ICD-10-CM

## 2020-11-02 PROCEDURE — 64615 CHEMODENERV MUSC MIGRAINE: CPT | Performed by: PSYCHIATRY & NEUROLOGY

## 2020-11-02 PROCEDURE — 1036F TOBACCO NON-USER: CPT | Performed by: PSYCHIATRY & NEUROLOGY

## 2020-11-02 PROCEDURE — 99214 OFFICE O/P EST MOD 30 MIN: CPT | Performed by: PSYCHIATRY & NEUROLOGY

## 2020-11-02 RX ORDER — DULOXETIN HYDROCHLORIDE 60 MG/1
60 CAPSULE, DELAYED RELEASE ORAL DAILY
Qty: 90 CAPSULE | Refills: 1 | Status: SHIPPED | OUTPATIENT
Start: 2020-11-02 | End: 2021-01-20 | Stop reason: SDUPTHER

## 2020-11-02 RX ORDER — BUSPIRONE HYDROCHLORIDE 15 MG/1
15 TABLET ORAL 2 TIMES DAILY
Qty: 180 TABLET | Refills: 1 | Status: SHIPPED | OUTPATIENT
Start: 2020-11-02 | End: 2021-01-20 | Stop reason: SDUPTHER

## 2020-11-02 RX ORDER — DULOXETIN HYDROCHLORIDE 30 MG/1
30 CAPSULE, DELAYED RELEASE ORAL
Qty: 90 CAPSULE | Refills: 1 | Status: SHIPPED | OUTPATIENT
Start: 2020-11-02 | End: 2021-01-20 | Stop reason: SDUPTHER

## 2020-11-02 RX ORDER — LAMOTRIGINE 100 MG/1
50 TABLET ORAL DAILY
Qty: 45 TABLET | Refills: 1 | Status: SHIPPED | OUTPATIENT
Start: 2020-11-02 | End: 2021-01-20 | Stop reason: SDUPTHER

## 2020-11-09 ENCOUNTER — OFFICE VISIT (OUTPATIENT)
Dept: OBGYN CLINIC | Facility: CLINIC | Age: 30
End: 2020-11-09
Payer: COMMERCIAL

## 2020-11-09 VITALS
DIASTOLIC BLOOD PRESSURE: 85 MMHG | HEIGHT: 64 IN | TEMPERATURE: 97.5 F | WEIGHT: 181 LBS | HEART RATE: 94 BPM | SYSTOLIC BLOOD PRESSURE: 121 MMHG | BODY MASS INDEX: 30.9 KG/M2

## 2020-11-09 DIAGNOSIS — M79.671 HEEL PAIN, BILATERAL: Primary | ICD-10-CM

## 2020-11-09 DIAGNOSIS — M79.672 HEEL PAIN, BILATERAL: Primary | ICD-10-CM

## 2020-11-09 PROCEDURE — 99244 OFF/OP CNSLTJ NEW/EST MOD 40: CPT | Performed by: PHYSICAL MEDICINE & REHABILITATION

## 2020-11-09 PROCEDURE — 1036F TOBACCO NON-USER: CPT | Performed by: PHYSICAL MEDICINE & REHABILITATION

## 2020-11-09 PROCEDURE — 3008F BODY MASS INDEX DOCD: CPT | Performed by: PHYSICAL MEDICINE & REHABILITATION

## 2020-11-09 RX ORDER — MELOXICAM 7.5 MG/1
7.5 TABLET ORAL DAILY PRN
Qty: 60 TABLET | Refills: 0 | Status: SHIPPED | OUTPATIENT
Start: 2020-11-09 | End: 2020-12-17

## 2020-11-30 ENCOUNTER — SOCIAL WORK (OUTPATIENT)
Dept: BEHAVIORAL/MENTAL HEALTH CLINIC | Facility: CLINIC | Age: 30
End: 2020-11-30
Payer: COMMERCIAL

## 2020-11-30 DIAGNOSIS — F50.9 EATING DISORDER, UNSPECIFIED TYPE: ICD-10-CM

## 2020-11-30 DIAGNOSIS — F41.1 GAD (GENERALIZED ANXIETY DISORDER): ICD-10-CM

## 2020-11-30 DIAGNOSIS — F33.1 MODERATE EPISODE OF RECURRENT MAJOR DEPRESSIVE DISORDER (HCC): Primary | ICD-10-CM

## 2020-11-30 DIAGNOSIS — Z72.89 SELF-INJURIOUS BEHAVIOR: ICD-10-CM

## 2020-11-30 PROCEDURE — 90834 PSYTX W PT 45 MINUTES: CPT | Performed by: SOCIAL WORKER

## 2020-12-02 ENCOUNTER — EVALUATION (OUTPATIENT)
Dept: PHYSICAL THERAPY | Facility: CLINIC | Age: 30
End: 2020-12-02
Payer: COMMERCIAL

## 2020-12-02 DIAGNOSIS — M79.672 HEEL PAIN, BILATERAL: Primary | ICD-10-CM

## 2020-12-02 DIAGNOSIS — M79.671 HEEL PAIN, BILATERAL: Primary | ICD-10-CM

## 2020-12-02 PROCEDURE — 97162 PT EVAL MOD COMPLEX 30 MIN: CPT | Performed by: PHYSICAL THERAPIST

## 2020-12-02 PROCEDURE — 97110 THERAPEUTIC EXERCISES: CPT | Performed by: PHYSICAL THERAPIST

## 2020-12-08 ENCOUNTER — TELEPHONE (OUTPATIENT)
Dept: OBGYN CLINIC | Facility: HOSPITAL | Age: 30
End: 2020-12-08

## 2020-12-09 ENCOUNTER — APPOINTMENT (OUTPATIENT)
Dept: PHYSICAL THERAPY | Facility: CLINIC | Age: 30
End: 2020-12-09
Payer: COMMERCIAL

## 2020-12-10 ENCOUNTER — OFFICE VISIT (OUTPATIENT)
Dept: PHYSICAL THERAPY | Facility: CLINIC | Age: 30
End: 2020-12-10
Payer: COMMERCIAL

## 2020-12-10 DIAGNOSIS — M79.671 HEEL PAIN, BILATERAL: Primary | ICD-10-CM

## 2020-12-10 DIAGNOSIS — M79.672 HEEL PAIN, BILATERAL: Primary | ICD-10-CM

## 2020-12-10 PROCEDURE — 97112 NEUROMUSCULAR REEDUCATION: CPT | Performed by: PHYSICAL THERAPIST

## 2020-12-10 PROCEDURE — 97140 MANUAL THERAPY 1/> REGIONS: CPT | Performed by: PHYSICAL THERAPIST

## 2020-12-10 PROCEDURE — 97110 THERAPEUTIC EXERCISES: CPT | Performed by: PHYSICAL THERAPIST

## 2020-12-14 ENCOUNTER — OFFICE VISIT (OUTPATIENT)
Dept: PHYSICAL THERAPY | Facility: CLINIC | Age: 30
End: 2020-12-14
Payer: COMMERCIAL

## 2020-12-14 DIAGNOSIS — M79.7 FIBROMYALGIA: ICD-10-CM

## 2020-12-14 DIAGNOSIS — M79.671 HEEL PAIN, BILATERAL: Primary | ICD-10-CM

## 2020-12-14 DIAGNOSIS — M79.672 HEEL PAIN, BILATERAL: Primary | ICD-10-CM

## 2020-12-14 PROCEDURE — 97112 NEUROMUSCULAR REEDUCATION: CPT

## 2020-12-14 PROCEDURE — 97110 THERAPEUTIC EXERCISES: CPT

## 2020-12-14 PROCEDURE — 97140 MANUAL THERAPY 1/> REGIONS: CPT

## 2020-12-15 ENCOUNTER — OFFICE VISIT (OUTPATIENT)
Dept: PHYSICAL THERAPY | Facility: CLINIC | Age: 30
End: 2020-12-15
Payer: COMMERCIAL

## 2020-12-15 DIAGNOSIS — M79.672 HEEL PAIN, BILATERAL: Primary | ICD-10-CM

## 2020-12-15 DIAGNOSIS — M79.671 HEEL PAIN, BILATERAL: Primary | ICD-10-CM

## 2020-12-15 PROCEDURE — 97140 MANUAL THERAPY 1/> REGIONS: CPT | Performed by: PHYSICAL THERAPIST

## 2020-12-15 PROCEDURE — 97110 THERAPEUTIC EXERCISES: CPT | Performed by: PHYSICAL THERAPIST

## 2020-12-15 PROCEDURE — 97112 NEUROMUSCULAR REEDUCATION: CPT | Performed by: PHYSICAL THERAPIST

## 2020-12-16 ENCOUNTER — APPOINTMENT (OUTPATIENT)
Dept: PHYSICAL THERAPY | Facility: CLINIC | Age: 30
End: 2020-12-16
Payer: COMMERCIAL

## 2020-12-18 ENCOUNTER — TELEPHONE (OUTPATIENT)
Dept: OBGYN CLINIC | Facility: HOSPITAL | Age: 30
End: 2020-12-18

## 2020-12-21 ENCOUNTER — OFFICE VISIT (OUTPATIENT)
Dept: PHYSICAL THERAPY | Facility: CLINIC | Age: 30
End: 2020-12-21
Payer: COMMERCIAL

## 2020-12-21 DIAGNOSIS — M79.672 HEEL PAIN, BILATERAL: Primary | ICD-10-CM

## 2020-12-21 DIAGNOSIS — M79.671 HEEL PAIN, BILATERAL: Primary | ICD-10-CM

## 2020-12-21 PROCEDURE — 97110 THERAPEUTIC EXERCISES: CPT

## 2020-12-21 PROCEDURE — 97140 MANUAL THERAPY 1/> REGIONS: CPT

## 2020-12-21 PROCEDURE — 97112 NEUROMUSCULAR REEDUCATION: CPT

## 2020-12-21 RX ORDER — PREGABALIN 150 MG/1
CAPSULE ORAL
Qty: 180 CAPSULE | Refills: 0 | Status: SHIPPED | OUTPATIENT
Start: 2020-12-21 | End: 2021-03-09 | Stop reason: SDUPTHER

## 2020-12-22 ENCOUNTER — HOSPITAL ENCOUNTER (OUTPATIENT)
Dept: GASTROENTEROLOGY | Facility: HOSPITAL | Age: 30
Setting detail: OUTPATIENT SURGERY
Discharge: HOME | End: 2020-12-22
Attending: SURGERY
Payer: COMMERCIAL

## 2020-12-22 ENCOUNTER — ANESTHESIA (OUTPATIENT)
Dept: GASTROENTEROLOGY | Facility: HOSPITAL | Age: 30
End: 2020-12-22

## 2020-12-22 ENCOUNTER — ANESTHESIA EVENT (OUTPATIENT)
Dept: GASTROENTEROLOGY | Facility: HOSPITAL | Age: 30
End: 2020-12-22

## 2020-12-22 VITALS
SYSTOLIC BLOOD PRESSURE: 106 MMHG | DIASTOLIC BLOOD PRESSURE: 74 MMHG | OXYGEN SATURATION: 97 % | HEART RATE: 74 BPM | TEMPERATURE: 97.6 F | RESPIRATION RATE: 16 BRPM

## 2020-12-22 VITALS — HEART RATE: 66 BPM

## 2020-12-22 DIAGNOSIS — K62.5 HEMORRHAGE OF ANUS AND RECTUM: ICD-10-CM

## 2020-12-22 DIAGNOSIS — R19.4 CHANGE IN BOWEL HABIT: ICD-10-CM

## 2020-12-22 PROBLEM — E66.9 OBESITY (BMI 30-39.9): Status: ACTIVE | Noted: 2020-12-22

## 2020-12-22 LAB
EXT PREGNANCY TEST URINE: NEGATIVE
EXT. CONTROL: NORMAL

## 2020-12-22 PROCEDURE — 81025 URINE PREGNANCY TEST: CPT | Performed by: SURGERY

## 2020-12-22 RX ORDER — ONDANSETRON 2 MG/ML
4 INJECTION INTRAMUSCULAR; INTRAVENOUS ONCE AS NEEDED
Status: CANCELLED | OUTPATIENT
Start: 2020-12-22

## 2020-12-22 RX ORDER — SODIUM CHLORIDE 9 MG/ML
INJECTION, SOLUTION INTRAVENOUS CONTINUOUS PRN
Status: DISCONTINUED | OUTPATIENT
Start: 2020-12-22 | End: 2020-12-22

## 2020-12-22 RX ORDER — PROPOFOL 10 MG/ML
INJECTION, EMULSION INTRAVENOUS AS NEEDED
Status: DISCONTINUED | OUTPATIENT
Start: 2020-12-22 | End: 2020-12-22

## 2020-12-22 RX ORDER — LIDOCAINE HYDROCHLORIDE 10 MG/ML
INJECTION, SOLUTION EPIDURAL; INFILTRATION; INTRACAUDAL; PERINEURAL AS NEEDED
Status: DISCONTINUED | OUTPATIENT
Start: 2020-12-22 | End: 2020-12-22

## 2020-12-22 RX ORDER — SODIUM CHLORIDE 9 MG/ML
125 INJECTION, SOLUTION INTRAVENOUS CONTINUOUS
Status: CANCELLED | OUTPATIENT
Start: 2020-12-22

## 2020-12-22 RX ORDER — SODIUM CHLORIDE 9 MG/ML
20 INJECTION, SOLUTION INTRAVENOUS CONTINUOUS
Status: CANCELLED | OUTPATIENT
Start: 2020-12-22

## 2020-12-22 RX ADMIN — PROPOFOL 50 MG: 10 INJECTION, EMULSION INTRAVENOUS at 10:06

## 2020-12-22 RX ADMIN — PROPOFOL 100 MG: 10 INJECTION, EMULSION INTRAVENOUS at 09:59

## 2020-12-22 RX ADMIN — PROPOFOL 50 MG: 10 INJECTION, EMULSION INTRAVENOUS at 10:01

## 2020-12-22 RX ADMIN — PROPOFOL 50 MG: 10 INJECTION, EMULSION INTRAVENOUS at 10:03

## 2020-12-22 RX ADMIN — SODIUM CHLORIDE: 0.9 INJECTION, SOLUTION INTRAVENOUS at 09:59

## 2020-12-22 RX ADMIN — LIDOCAINE HYDROCHLORIDE 50 MG: 10 INJECTION, SOLUTION EPIDURAL; INFILTRATION; INTRACAUDAL; PERINEURAL at 09:59

## 2020-12-22 RX ADMIN — PROPOFOL 50 MG: 10 INJECTION, EMULSION INTRAVENOUS at 10:09

## 2020-12-23 ENCOUNTER — ANESTHESIA EVENT (OUTPATIENT)
Dept: PERIOP | Facility: HOSPITAL | Age: 30
End: 2020-12-23
Payer: COMMERCIAL

## 2020-12-23 ENCOUNTER — ANESTHESIA (OUTPATIENT)
Dept: PERIOP | Facility: HOSPITAL | Age: 30
End: 2020-12-23
Payer: COMMERCIAL

## 2020-12-23 ENCOUNTER — HOSPITAL ENCOUNTER (OUTPATIENT)
Facility: HOSPITAL | Age: 30
Setting detail: OUTPATIENT SURGERY
Discharge: HOME | End: 2020-12-23
Attending: SURGERY | Admitting: SURGERY
Payer: COMMERCIAL

## 2020-12-23 VITALS
SYSTOLIC BLOOD PRESSURE: 103 MMHG | RESPIRATION RATE: 17 BRPM | OXYGEN SATURATION: 98 % | BODY MASS INDEX: 30.39 KG/M2 | HEIGHT: 64 IN | TEMPERATURE: 98.2 F | WEIGHT: 178 LBS | HEART RATE: 79 BPM | DIASTOLIC BLOOD PRESSURE: 68 MMHG

## 2020-12-23 VITALS — HEART RATE: 85 BPM

## 2020-12-23 DIAGNOSIS — K62.3 RECTAL PROLAPSE: ICD-10-CM

## 2020-12-23 DIAGNOSIS — K62.5 HEMORRHAGE OF ANUS AND RECTUM: ICD-10-CM

## 2020-12-23 DIAGNOSIS — N81.6 RECTOCELE: ICD-10-CM

## 2020-12-23 LAB
EXT PREGNANCY TEST URINE: NEGATIVE
EXT. CONTROL: NORMAL

## 2020-12-23 PROCEDURE — 88344 IMHCHEM/IMCYTCHM EA MLT ANTB: CPT | Performed by: PATHOLOGY

## 2020-12-23 PROCEDURE — 81025 URINE PREGNANCY TEST: CPT | Performed by: SURGERY

## 2020-12-23 PROCEDURE — 88302 TISSUE EXAM BY PATHOLOGIST: CPT | Performed by: PATHOLOGY

## 2020-12-23 RX ORDER — FENTANYL CITRATE/PF 50 MCG/ML
25 SYRINGE (ML) INJECTION
Status: DISCONTINUED | OUTPATIENT
Start: 2020-12-23 | End: 2020-12-23 | Stop reason: HOSPADM

## 2020-12-23 RX ORDER — MAGNESIUM HYDROXIDE 1200 MG/15ML
LIQUID ORAL AS NEEDED
Status: DISCONTINUED | OUTPATIENT
Start: 2020-12-23 | End: 2020-12-23 | Stop reason: HOSPADM

## 2020-12-23 RX ORDER — MIDAZOLAM HYDROCHLORIDE 2 MG/2ML
INJECTION, SOLUTION INTRAMUSCULAR; INTRAVENOUS AS NEEDED
Status: DISCONTINUED | OUTPATIENT
Start: 2020-12-23 | End: 2020-12-23

## 2020-12-23 RX ORDER — ONDANSETRON 2 MG/ML
4 INJECTION INTRAMUSCULAR; INTRAVENOUS ONCE AS NEEDED
Status: DISCONTINUED | OUTPATIENT
Start: 2020-12-23 | End: 2020-12-23 | Stop reason: HOSPADM

## 2020-12-23 RX ORDER — PROPOFOL 10 MG/ML
INJECTION, EMULSION INTRAVENOUS AS NEEDED
Status: DISCONTINUED | OUTPATIENT
Start: 2020-12-23 | End: 2020-12-23

## 2020-12-23 RX ORDER — CEFAZOLIN SODIUM 2 G/50ML
2000 SOLUTION INTRAVENOUS EVERY 8 HOURS
Status: DISCONTINUED | OUTPATIENT
Start: 2020-12-23 | End: 2020-12-23 | Stop reason: HOSPADM

## 2020-12-23 RX ORDER — ONDANSETRON 2 MG/ML
INJECTION INTRAMUSCULAR; INTRAVENOUS AS NEEDED
Status: DISCONTINUED | OUTPATIENT
Start: 2020-12-23 | End: 2020-12-23

## 2020-12-23 RX ORDER — SUCCINYLCHOLINE/SOD CL,ISO/PF 100 MG/5ML
SYRINGE (ML) INTRAVENOUS AS NEEDED
Status: DISCONTINUED | OUTPATIENT
Start: 2020-12-23 | End: 2020-12-23

## 2020-12-23 RX ORDER — DEXMEDETOMIDINE HYDROCHLORIDE 100 UG/ML
INJECTION, SOLUTION INTRAVENOUS AS NEEDED
Status: DISCONTINUED | OUTPATIENT
Start: 2020-12-23 | End: 2020-12-23

## 2020-12-23 RX ORDER — FENTANYL CITRATE 50 UG/ML
INJECTION, SOLUTION INTRAMUSCULAR; INTRAVENOUS AS NEEDED
Status: DISCONTINUED | OUTPATIENT
Start: 2020-12-23 | End: 2020-12-23

## 2020-12-23 RX ORDER — SODIUM CHLORIDE, SODIUM LACTATE, POTASSIUM CHLORIDE, CALCIUM CHLORIDE 600; 310; 30; 20 MG/100ML; MG/100ML; MG/100ML; MG/100ML
100 INJECTION, SOLUTION INTRAVENOUS CONTINUOUS
Status: CANCELLED | OUTPATIENT
Start: 2020-12-23

## 2020-12-23 RX ORDER — LIDOCAINE HYDROCHLORIDE 20 MG/ML
JELLY TOPICAL DAILY PRN
Status: DISCONTINUED | OUTPATIENT
Start: 2020-12-23 | End: 2020-12-23 | Stop reason: HOSPADM

## 2020-12-23 RX ORDER — LIDOCAINE HYDROCHLORIDE 10 MG/ML
INJECTION, SOLUTION EPIDURAL; INFILTRATION; INTRACAUDAL; PERINEURAL AS NEEDED
Status: DISCONTINUED | OUTPATIENT
Start: 2020-12-23 | End: 2020-12-23

## 2020-12-23 RX ORDER — HYDROMORPHONE HCL/PF 1 MG/ML
SYRINGE (ML) INJECTION AS NEEDED
Status: DISCONTINUED | OUTPATIENT
Start: 2020-12-23 | End: 2020-12-23

## 2020-12-23 RX ORDER — DEXAMETHASONE SODIUM PHOSPHATE 10 MG/ML
INJECTION, SOLUTION INTRAMUSCULAR; INTRAVENOUS AS NEEDED
Status: DISCONTINUED | OUTPATIENT
Start: 2020-12-23 | End: 2020-12-23

## 2020-12-23 RX ORDER — PROPOFOL 10 MG/ML
INJECTION, EMULSION INTRAVENOUS CONTINUOUS PRN
Status: DISCONTINUED | OUTPATIENT
Start: 2020-12-23 | End: 2020-12-23

## 2020-12-23 RX ORDER — SODIUM CHLORIDE, SODIUM LACTATE, POTASSIUM CHLORIDE, CALCIUM CHLORIDE 600; 310; 30; 20 MG/100ML; MG/100ML; MG/100ML; MG/100ML
INJECTION, SOLUTION INTRAVENOUS CONTINUOUS PRN
Status: DISCONTINUED | OUTPATIENT
Start: 2020-12-23 | End: 2020-12-23

## 2020-12-23 RX ORDER — HYDROMORPHONE HCL/PF 1 MG/ML
0.5 SYRINGE (ML) INJECTION
Status: DISCONTINUED | OUTPATIENT
Start: 2020-12-23 | End: 2020-12-23 | Stop reason: HOSPADM

## 2020-12-23 RX ORDER — BUPIVACAINE HYDROCHLORIDE AND EPINEPHRINE 2.5; 5 MG/ML; UG/ML
INJECTION, SOLUTION EPIDURAL; INFILTRATION; INTRACAUDAL; PERINEURAL AS NEEDED
Status: DISCONTINUED | OUTPATIENT
Start: 2020-12-23 | End: 2020-12-23 | Stop reason: HOSPADM

## 2020-12-23 RX ORDER — KETAMINE HCL IN NACL, ISO-OSM 100MG/10ML
SYRINGE (ML) INJECTION AS NEEDED
Status: DISCONTINUED | OUTPATIENT
Start: 2020-12-23 | End: 2020-12-23

## 2020-12-23 RX ADMIN — Medication 40 MG: at 15:20

## 2020-12-23 RX ADMIN — DEXMEDETOMIDINE HYDROCHLORIDE 8 MCG: 100 INJECTION, SOLUTION INTRAVENOUS at 14:37

## 2020-12-23 RX ADMIN — DEXMEDETOMIDINE HYDROCHLORIDE 8 MCG: 100 INJECTION, SOLUTION INTRAVENOUS at 14:45

## 2020-12-23 RX ADMIN — DEXMEDETOMIDINE HYDROCHLORIDE 8 MCG: 100 INJECTION, SOLUTION INTRAVENOUS at 14:33

## 2020-12-23 RX ADMIN — FENTANYL CITRATE 50 MCG: 50 INJECTION, SOLUTION INTRAMUSCULAR; INTRAVENOUS at 14:24

## 2020-12-23 RX ADMIN — MIDAZOLAM HYDROCHLORIDE 2 MG: 1 INJECTION, SOLUTION INTRAMUSCULAR; INTRAVENOUS at 14:03

## 2020-12-23 RX ADMIN — METRONIDAZOLE 500 MG: 500 INJECTION, SOLUTION INTRAVENOUS at 14:11

## 2020-12-23 RX ADMIN — FENTANYL CITRATE 50 MCG: 50 INJECTION, SOLUTION INTRAMUSCULAR; INTRAVENOUS at 14:59

## 2020-12-23 RX ADMIN — SODIUM CHLORIDE, SODIUM LACTATE, POTASSIUM CHLORIDE, AND CALCIUM CHLORIDE: .6; .31; .03; .02 INJECTION, SOLUTION INTRAVENOUS at 14:00

## 2020-12-23 RX ADMIN — HYDROMORPHONE HYDROCHLORIDE 0.5 MG: 1 INJECTION, SOLUTION INTRAMUSCULAR; INTRAVENOUS; SUBCUTANEOUS at 15:15

## 2020-12-23 RX ADMIN — PROPOFOL 50 MG: 10 INJECTION, EMULSION INTRAVENOUS at 15:12

## 2020-12-23 RX ADMIN — FENTANYL CITRATE 100 MCG: 50 INJECTION, SOLUTION INTRAMUSCULAR; INTRAVENOUS at 14:08

## 2020-12-23 RX ADMIN — LIDOCAINE HYDROCHLORIDE 1 APPLICATION: 20 JELLY TOPICAL at 17:02

## 2020-12-23 RX ADMIN — LIDOCAINE HYDROCHLORIDE 50 MG: 10 INJECTION, SOLUTION EPIDURAL; INFILTRATION; INTRACAUDAL at 14:08

## 2020-12-23 RX ADMIN — DEXMEDETOMIDINE HYDROCHLORIDE 4 MCG: 100 INJECTION, SOLUTION INTRAVENOUS at 14:59

## 2020-12-23 RX ADMIN — Medication 100 MG: at 14:08

## 2020-12-23 RX ADMIN — PROPOFOL 150 MCG/KG/MIN: 10 INJECTION, EMULSION INTRAVENOUS at 14:23

## 2020-12-23 RX ADMIN — Medication 10 MG: at 15:36

## 2020-12-23 RX ADMIN — DEXAMETHASONE SODIUM PHOSPHATE 4 MG: 10 INJECTION, SOLUTION INTRAMUSCULAR; INTRAVENOUS at 14:10

## 2020-12-23 RX ADMIN — CEFAZOLIN SODIUM 2000 MG: 2 SOLUTION INTRAVENOUS at 14:08

## 2020-12-23 RX ADMIN — PROPOFOL: 10 INJECTION, EMULSION INTRAVENOUS at 15:02

## 2020-12-23 RX ADMIN — PROPOFOL 200 MG: 10 INJECTION, EMULSION INTRAVENOUS at 14:08

## 2020-12-23 RX ADMIN — ONDANSETRON 4 MG: 2 INJECTION INTRAMUSCULAR; INTRAVENOUS at 15:46

## 2020-12-24 ENCOUNTER — HOSPITAL ENCOUNTER (OUTPATIENT)
Facility: HOSPITAL | Age: 30
Setting detail: OBSERVATION
Discharge: HOME | End: 2020-12-26
Attending: EMERGENCY MEDICINE | Admitting: SURGERY
Payer: COMMERCIAL

## 2020-12-24 ENCOUNTER — APPOINTMENT (EMERGENCY)
Dept: CT IMAGING | Facility: HOSPITAL | Age: 30
End: 2020-12-24
Payer: COMMERCIAL

## 2020-12-24 DIAGNOSIS — G89.18 POST-OPERATIVE PAIN: Primary | ICD-10-CM

## 2020-12-24 LAB
ALBUMIN SERPL BCP-MCNC: 4 G/DL (ref 3.5–5)
ALP SERPL-CCNC: 45 U/L (ref 46–116)
ALT SERPL W P-5'-P-CCNC: 30 U/L (ref 12–78)
ANION GAP SERPL CALCULATED.3IONS-SCNC: 10 MMOL/L (ref 4–13)
APTT PPP: 26 SECONDS (ref 23–37)
AST SERPL W P-5'-P-CCNC: 18 U/L (ref 5–45)
BACTERIA UR QL AUTO: NORMAL /HPF
BASOPHILS # BLD AUTO: 0.02 THOUSANDS/ΜL (ref 0–0.1)
BASOPHILS NFR BLD AUTO: 0 % (ref 0–1)
BILIRUB SERPL-MCNC: 0.19 MG/DL (ref 0.2–1)
BILIRUB UR QL STRIP: NEGATIVE
BUN SERPL-MCNC: 6 MG/DL (ref 5–25)
CALCIUM SERPL-MCNC: 9 MG/DL (ref 8.3–10.1)
CHLORIDE SERPL-SCNC: 102 MMOL/L (ref 100–108)
CLARITY UR: CLEAR
CO2 SERPL-SCNC: 25 MMOL/L (ref 21–32)
COLOR UR: YELLOW
CREAT SERPL-MCNC: 0.72 MG/DL (ref 0.6–1.3)
EOSINOPHIL # BLD AUTO: 0 THOUSAND/ΜL (ref 0–0.61)
EOSINOPHIL NFR BLD AUTO: 0 % (ref 0–6)
ERYTHROCYTE [DISTWIDTH] IN BLOOD BY AUTOMATED COUNT: 12.1 % (ref 11.6–15.1)
GFR SERPL CREATININE-BSD FRML MDRD: 114 ML/MIN/1.73SQ M
GLUCOSE SERPL-MCNC: 162 MG/DL (ref 65–140)
GLUCOSE UR STRIP-MCNC: NEGATIVE MG/DL
HCG SERPL QL: NEGATIVE
HCT VFR BLD AUTO: 38.8 % (ref 34.8–46.1)
HGB BLD-MCNC: 12.9 G/DL (ref 11.5–15.4)
HGB UR QL STRIP.AUTO: ABNORMAL
IMM GRANULOCYTES # BLD AUTO: 0.07 THOUSAND/UL (ref 0–0.2)
IMM GRANULOCYTES NFR BLD AUTO: 1 % (ref 0–2)
INR PPP: 0.98 (ref 0.84–1.19)
KETONES UR STRIP-MCNC: NEGATIVE MG/DL
LEUKOCYTE ESTERASE UR QL STRIP: NEGATIVE
LIPASE SERPL-CCNC: 80 U/L (ref 73–393)
LYMPHOCYTES # BLD AUTO: 1.21 THOUSANDS/ΜL (ref 0.6–4.47)
LYMPHOCYTES NFR BLD AUTO: 8 % (ref 14–44)
MCH RBC QN AUTO: 28.2 PG (ref 26.8–34.3)
MCHC RBC AUTO-ENTMCNC: 33.2 G/DL (ref 31.4–37.4)
MCV RBC AUTO: 85 FL (ref 82–98)
MONOCYTES # BLD AUTO: 0.75 THOUSAND/ΜL (ref 0.17–1.22)
MONOCYTES NFR BLD AUTO: 5 % (ref 4–12)
NEUTROPHILS # BLD AUTO: 12.96 THOUSANDS/ΜL (ref 1.85–7.62)
NEUTS SEG NFR BLD AUTO: 86 % (ref 43–75)
NITRITE UR QL STRIP: NEGATIVE
NON-SQ EPI CELLS URNS QL MICRO: NORMAL /HPF
NRBC BLD AUTO-RTO: 0 /100 WBCS
PH UR STRIP.AUTO: 6 [PH] (ref 4.5–8)
PLATELET # BLD AUTO: 320 THOUSANDS/UL (ref 149–390)
PMV BLD AUTO: 10.8 FL (ref 8.9–12.7)
POTASSIUM SERPL-SCNC: 4 MMOL/L (ref 3.5–5.3)
PROT SERPL-MCNC: 7.3 G/DL (ref 6.4–8.2)
PROT UR STRIP-MCNC: NEGATIVE MG/DL
PROTHROMBIN TIME: 13.1 SECONDS (ref 11.6–14.5)
RBC # BLD AUTO: 4.57 MILLION/UL (ref 3.81–5.12)
RBC #/AREA URNS AUTO: NORMAL /HPF
SODIUM SERPL-SCNC: 137 MMOL/L (ref 136–145)
SP GR UR STRIP.AUTO: 1.01 (ref 1–1.03)
UROBILINOGEN UR QL STRIP.AUTO: 0.2 E.U./DL
WBC # BLD AUTO: 15.01 THOUSAND/UL (ref 4.31–10.16)
WBC #/AREA URNS AUTO: NORMAL /HPF

## 2020-12-24 PROCEDURE — 85025 COMPLETE CBC W/AUTO DIFF WBC: CPT | Performed by: PHYSICIAN ASSISTANT

## 2020-12-24 PROCEDURE — 99285 EMERGENCY DEPT VISIT HI MDM: CPT

## 2020-12-24 PROCEDURE — 96374 THER/PROPH/DIAG INJ IV PUSH: CPT

## 2020-12-24 PROCEDURE — 85730 THROMBOPLASTIN TIME PARTIAL: CPT | Performed by: PHYSICIAN ASSISTANT

## 2020-12-24 PROCEDURE — 84703 CHORIONIC GONADOTROPIN ASSAY: CPT | Performed by: PHYSICIAN ASSISTANT

## 2020-12-24 PROCEDURE — 96375 TX/PRO/DX INJ NEW DRUG ADDON: CPT

## 2020-12-24 PROCEDURE — 74177 CT ABD & PELVIS W/CONTRAST: CPT

## 2020-12-24 PROCEDURE — 96361 HYDRATE IV INFUSION ADD-ON: CPT

## 2020-12-24 PROCEDURE — 81001 URINALYSIS AUTO W/SCOPE: CPT

## 2020-12-24 PROCEDURE — 85610 PROTHROMBIN TIME: CPT | Performed by: PHYSICIAN ASSISTANT

## 2020-12-24 PROCEDURE — 80053 COMPREHEN METABOLIC PANEL: CPT | Performed by: PHYSICIAN ASSISTANT

## 2020-12-24 PROCEDURE — 99285 EMERGENCY DEPT VISIT HI MDM: CPT | Performed by: PHYSICIAN ASSISTANT

## 2020-12-24 PROCEDURE — 83690 ASSAY OF LIPASE: CPT | Performed by: PHYSICIAN ASSISTANT

## 2020-12-24 PROCEDURE — 51798 US URINE CAPACITY MEASURE: CPT

## 2020-12-24 PROCEDURE — 36415 COLL VENOUS BLD VENIPUNCTURE: CPT | Performed by: PHYSICIAN ASSISTANT

## 2020-12-24 RX ORDER — LIDOCAINE HYDROCHLORIDE 20 MG/ML
JELLY TOPICAL ONCE
Status: COMPLETED | OUTPATIENT
Start: 2020-12-24 | End: 2020-12-24

## 2020-12-24 RX ORDER — HEPARIN SODIUM 5000 [USP'U]/ML
5000 INJECTION, SOLUTION INTRAVENOUS; SUBCUTANEOUS EVERY 8 HOURS SCHEDULED
Status: DISCONTINUED | OUTPATIENT
Start: 2020-12-24 | End: 2020-12-26 | Stop reason: HOSPADM

## 2020-12-24 RX ORDER — SODIUM CHLORIDE, SODIUM LACTATE, POTASSIUM CHLORIDE, CALCIUM CHLORIDE 600; 310; 30; 20 MG/100ML; MG/100ML; MG/100ML; MG/100ML
100 INJECTION, SOLUTION INTRAVENOUS CONTINUOUS
Status: DISCONTINUED | OUTPATIENT
Start: 2020-12-24 | End: 2020-12-25

## 2020-12-24 RX ORDER — FENTANYL CITRATE 50 UG/ML
50 INJECTION, SOLUTION INTRAMUSCULAR; INTRAVENOUS ONCE
Status: COMPLETED | OUTPATIENT
Start: 2020-12-24 | End: 2020-12-24

## 2020-12-24 RX ORDER — KETOROLAC TROMETHAMINE 30 MG/ML
30 INJECTION, SOLUTION INTRAMUSCULAR; INTRAVENOUS EVERY 6 HOURS SCHEDULED
Status: COMPLETED | OUTPATIENT
Start: 2020-12-24 | End: 2020-12-25

## 2020-12-24 RX ORDER — ONDANSETRON 2 MG/ML
4 INJECTION INTRAMUSCULAR; INTRAVENOUS ONCE
Status: COMPLETED | OUTPATIENT
Start: 2020-12-24 | End: 2020-12-24

## 2020-12-24 RX ORDER — OXYCODONE HYDROCHLORIDE 5 MG/1
5 TABLET ORAL EVERY 4 HOURS PRN
Status: DISCONTINUED | OUTPATIENT
Start: 2020-12-24 | End: 2020-12-26 | Stop reason: HOSPADM

## 2020-12-24 RX ORDER — HYDROMORPHONE HCL/PF 1 MG/ML
0.5 SYRINGE (ML) INJECTION
Status: DISCONTINUED | OUTPATIENT
Start: 2020-12-24 | End: 2020-12-26 | Stop reason: HOSPADM

## 2020-12-24 RX ORDER — OXYCODONE HYDROCHLORIDE 10 MG/1
10 TABLET ORAL EVERY 4 HOURS PRN
Status: DISCONTINUED | OUTPATIENT
Start: 2020-12-24 | End: 2020-12-26 | Stop reason: HOSPADM

## 2020-12-24 RX ORDER — MORPHINE SULFATE 4 MG/ML
4 INJECTION, SOLUTION INTRAMUSCULAR; INTRAVENOUS ONCE
Status: COMPLETED | OUTPATIENT
Start: 2020-12-24 | End: 2020-12-24

## 2020-12-24 RX ADMIN — HEPARIN SODIUM 5000 UNITS: 5000 INJECTION INTRAVENOUS; SUBCUTANEOUS at 07:55

## 2020-12-24 RX ADMIN — HYDROMORPHONE HYDROCHLORIDE 0.5 MG: 1 INJECTION, SOLUTION INTRAMUSCULAR; INTRAVENOUS; SUBCUTANEOUS at 10:52

## 2020-12-24 RX ADMIN — HYDROMORPHONE HYDROCHLORIDE 0.5 MG: 1 INJECTION, SOLUTION INTRAMUSCULAR; INTRAVENOUS; SUBCUTANEOUS at 21:33

## 2020-12-24 RX ADMIN — MORPHINE SULFATE 4 MG: 4 INJECTION INTRAVENOUS at 04:18

## 2020-12-24 RX ADMIN — KETOROLAC TROMETHAMINE 30 MG: 30 INJECTION, SOLUTION INTRAMUSCULAR at 15:35

## 2020-12-24 RX ADMIN — SODIUM CHLORIDE, SODIUM LACTATE, POTASSIUM CHLORIDE, AND CALCIUM CHLORIDE 100 ML/HR: .6; .31; .03; .02 INJECTION, SOLUTION INTRAVENOUS at 07:57

## 2020-12-24 RX ADMIN — FENTANYL CITRATE 50 MCG: 50 INJECTION, SOLUTION INTRAMUSCULAR; INTRAVENOUS at 05:27

## 2020-12-24 RX ADMIN — ONDANSETRON 4 MG: 2 INJECTION INTRAMUSCULAR; INTRAVENOUS at 04:15

## 2020-12-24 RX ADMIN — IOHEXOL 100 ML: 350 INJECTION, SOLUTION INTRAVENOUS at 05:11

## 2020-12-24 RX ADMIN — OXYCODONE HYDROCHLORIDE 10 MG: 10 TABLET ORAL at 13:57

## 2020-12-24 RX ADMIN — HEPARIN SODIUM 5000 UNITS: 5000 INJECTION INTRAVENOUS; SUBCUTANEOUS at 14:03

## 2020-12-24 RX ADMIN — OXYCODONE HYDROCHLORIDE 10 MG: 10 TABLET ORAL at 07:54

## 2020-12-24 RX ADMIN — OXYCODONE HYDROCHLORIDE 10 MG: 10 TABLET ORAL at 23:40

## 2020-12-24 RX ADMIN — LIDOCAINE HYDROCHLORIDE: 20 JELLY TOPICAL at 08:43

## 2020-12-24 RX ADMIN — SODIUM CHLORIDE 1000 ML: 0.9 INJECTION, SOLUTION INTRAVENOUS at 04:21

## 2020-12-25 RX ORDER — DOCUSATE SODIUM 100 MG/1
100 CAPSULE, LIQUID FILLED ORAL 2 TIMES DAILY
Status: DISCONTINUED | OUTPATIENT
Start: 2020-12-25 | End: 2020-12-26 | Stop reason: HOSPADM

## 2020-12-25 RX ORDER — OXYCODONE HYDROCHLORIDE 10 MG/1
10 TABLET ORAL ONCE
Status: COMPLETED | OUTPATIENT
Start: 2020-12-25 | End: 2020-12-25

## 2020-12-25 RX ORDER — SODIUM CHLORIDE, SODIUM LACTATE, POTASSIUM CHLORIDE, CALCIUM CHLORIDE 600; 310; 30; 20 MG/100ML; MG/100ML; MG/100ML; MG/100ML
100 INJECTION, SOLUTION INTRAVENOUS CONTINUOUS
Status: DISCONTINUED | OUTPATIENT
Start: 2020-12-25 | End: 2020-12-25

## 2020-12-25 RX ADMIN — DOCUSATE SODIUM 100 MG: 100 CAPSULE, LIQUID FILLED ORAL at 18:21

## 2020-12-25 RX ADMIN — HEPARIN SODIUM 5000 UNITS: 5000 INJECTION INTRAVENOUS; SUBCUTANEOUS at 22:29

## 2020-12-25 RX ADMIN — OXYCODONE HYDROCHLORIDE 10 MG: 10 TABLET ORAL at 18:20

## 2020-12-25 RX ADMIN — KETOROLAC TROMETHAMINE 30 MG: 30 INJECTION, SOLUTION INTRAMUSCULAR at 00:16

## 2020-12-25 RX ADMIN — OXYCODONE HYDROCHLORIDE 10 MG: 10 TABLET ORAL at 08:02

## 2020-12-25 RX ADMIN — OXYCODONE HYDROCHLORIDE 10 MG: 10 TABLET ORAL at 14:21

## 2020-12-25 RX ADMIN — KETOROLAC TROMETHAMINE 30 MG: 30 INJECTION, SOLUTION INTRAMUSCULAR at 13:18

## 2020-12-25 RX ADMIN — OXYCODONE HYDROCHLORIDE 10 MG: 10 TABLET ORAL at 16:58

## 2020-12-25 RX ADMIN — SODIUM CHLORIDE, SODIUM LACTATE, POTASSIUM CHLORIDE, AND CALCIUM CHLORIDE 100 ML/HR: .6; .31; .03; .02 INJECTION, SOLUTION INTRAVENOUS at 16:58

## 2020-12-25 RX ADMIN — SODIUM CHLORIDE, SODIUM LACTATE, POTASSIUM CHLORIDE, AND CALCIUM CHLORIDE 100 ML/HR: .6; .31; .03; .02 INJECTION, SOLUTION INTRAVENOUS at 10:48

## 2020-12-25 RX ADMIN — HEPARIN SODIUM 5000 UNITS: 5000 INJECTION INTRAVENOUS; SUBCUTANEOUS at 00:16

## 2020-12-25 RX ADMIN — HYDROMORPHONE HYDROCHLORIDE 0.5 MG: 1 INJECTION, SOLUTION INTRAMUSCULAR; INTRAVENOUS; SUBCUTANEOUS at 20:05

## 2020-12-25 RX ADMIN — OXYCODONE HYDROCHLORIDE 10 MG: 10 TABLET ORAL at 22:29

## 2020-12-25 RX ADMIN — HEPARIN SODIUM 5000 UNITS: 5000 INJECTION INTRAVENOUS; SUBCUTANEOUS at 05:17

## 2020-12-25 RX ADMIN — HEPARIN SODIUM 5000 UNITS: 5000 INJECTION INTRAVENOUS; SUBCUTANEOUS at 13:19

## 2020-12-25 RX ADMIN — DOCUSATE SODIUM 100 MG: 100 CAPSULE, LIQUID FILLED ORAL at 10:51

## 2020-12-25 RX ADMIN — MAGNESIUM HYDROXIDE 30 ML: 400 SUSPENSION ORAL at 10:45

## 2020-12-25 RX ADMIN — HYDROMORPHONE HYDROCHLORIDE 0.5 MG: 1 INJECTION, SOLUTION INTRAMUSCULAR; INTRAVENOUS; SUBCUTANEOUS at 17:00

## 2020-12-25 RX ADMIN — KETOROLAC TROMETHAMINE 30 MG: 30 INJECTION, SOLUTION INTRAMUSCULAR at 05:16

## 2020-12-25 RX ADMIN — SODIUM CHLORIDE, SODIUM LACTATE, POTASSIUM CHLORIDE, AND CALCIUM CHLORIDE 100 ML/HR: .6; .31; .03; .02 INJECTION, SOLUTION INTRAVENOUS at 00:16

## 2020-12-26 VITALS
RESPIRATION RATE: 18 BRPM | OXYGEN SATURATION: 94 % | TEMPERATURE: 98.5 F | BODY MASS INDEX: 30.37 KG/M2 | HEIGHT: 64 IN | SYSTOLIC BLOOD PRESSURE: 100 MMHG | HEART RATE: 67 BPM | DIASTOLIC BLOOD PRESSURE: 65 MMHG | WEIGHT: 177.91 LBS

## 2020-12-26 RX ADMIN — HYDROMORPHONE HYDROCHLORIDE 0.5 MG: 1 INJECTION, SOLUTION INTRAMUSCULAR; INTRAVENOUS; SUBCUTANEOUS at 01:45

## 2020-12-26 RX ADMIN — DOCUSATE SODIUM 100 MG: 100 CAPSULE, LIQUID FILLED ORAL at 09:10

## 2020-12-26 RX ADMIN — OXYCODONE HYDROCHLORIDE 10 MG: 10 TABLET ORAL at 05:41

## 2020-12-26 RX ADMIN — HEPARIN SODIUM 5000 UNITS: 5000 INJECTION INTRAVENOUS; SUBCUTANEOUS at 05:41

## 2020-12-28 ENCOUNTER — TRANSITIONAL CARE MANAGEMENT (OUTPATIENT)
Dept: INTERNAL MEDICINE CLINIC | Facility: CLINIC | Age: 30
End: 2020-12-28

## 2020-12-31 ENCOUNTER — TELEPHONE (OUTPATIENT)
Dept: NEUROLOGY | Facility: CLINIC | Age: 30
End: 2020-12-31

## 2021-01-11 ENCOUNTER — TELEPHONE (OUTPATIENT)
Dept: OBGYN CLINIC | Facility: CLINIC | Age: 31
End: 2021-01-11

## 2021-01-11 DIAGNOSIS — B37.3 VULVOVAGINAL CANDIDIASIS: Primary | ICD-10-CM

## 2021-01-11 RX ORDER — FLUCONAZOLE 150 MG/1
150 TABLET ORAL ONCE
Qty: 1 TABLET | Refills: 0 | Status: SHIPPED | OUTPATIENT
Start: 2021-01-11 | End: 2021-01-11

## 2021-01-11 NOTE — TELEPHONE ENCOUNTER
Left message on nurse line states she is starting with a yeast infection and would like provider to send diflucan  States she gets chronic yeast infections  RC left message to call the office

## 2021-01-11 NOTE — TELEPHONE ENCOUNTER
Patient returned called states she started with burning and itching yesterday  She is currently taking antibiotics for recently anal surgery Would like to know if provider can send Diflucan  Pharmacy updated  Routing to provider for  recommendations

## 2021-01-15 DIAGNOSIS — B37.3 YEAST INFECTION OF THE VAGINA: Primary | ICD-10-CM

## 2021-01-15 RX ORDER — FLUCONAZOLE 150 MG/1
150 TABLET ORAL ONCE
Qty: 1 TABLET | Refills: 0 | Status: SHIPPED | OUTPATIENT
Start: 2021-01-15 | End: 2021-01-15

## 2021-01-19 NOTE — PSYCH
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
## 2021-01-20 ENCOUNTER — TELEPHONE (OUTPATIENT)
Dept: OBGYN CLINIC | Facility: CLINIC | Age: 31
End: 2021-01-20

## 2021-01-20 ENCOUNTER — OFFICE VISIT (OUTPATIENT)
Dept: PSYCHIATRY | Facility: CLINIC | Age: 31
End: 2021-01-20
Payer: COMMERCIAL

## 2021-01-20 DIAGNOSIS — F33.1 MODERATE EPISODE OF RECURRENT MAJOR DEPRESSIVE DISORDER (HCC): ICD-10-CM

## 2021-01-20 DIAGNOSIS — F41.1 GAD (GENERALIZED ANXIETY DISORDER): ICD-10-CM

## 2021-01-20 DIAGNOSIS — Z72.89 SELF-INJURIOUS BEHAVIOR: ICD-10-CM

## 2021-01-20 PROCEDURE — 1111F DSCHRG MED/CURRENT MED MERGE: CPT | Performed by: PSYCHIATRY & NEUROLOGY

## 2021-01-20 PROCEDURE — 99213 OFFICE O/P EST LOW 20 MIN: CPT | Performed by: PSYCHIATRY & NEUROLOGY

## 2021-01-20 RX ORDER — DULOXETIN HYDROCHLORIDE 30 MG/1
30 CAPSULE, DELAYED RELEASE ORAL
Qty: 90 CAPSULE | Refills: 1 | Status: SHIPPED | OUTPATIENT
Start: 2021-01-20 | End: 2021-08-23

## 2021-01-20 RX ORDER — BUSPIRONE HYDROCHLORIDE 15 MG/1
15 TABLET ORAL 2 TIMES DAILY
Qty: 180 TABLET | Refills: 1 | Status: SHIPPED | OUTPATIENT
Start: 2021-01-20 | End: 2021-04-08

## 2021-01-20 RX ORDER — DULOXETIN HYDROCHLORIDE 60 MG/1
60 CAPSULE, DELAYED RELEASE ORAL
Qty: 90 CAPSULE | Refills: 1 | Status: SHIPPED | OUTPATIENT
Start: 2021-01-20 | End: 2021-07-14

## 2021-01-20 RX ORDER — LAMOTRIGINE 100 MG/1
100 TABLET ORAL DAILY
Qty: 90 TABLET | Refills: 1 | Status: SHIPPED | OUTPATIENT
Start: 2021-01-20 | End: 2021-09-23 | Stop reason: SDUPTHER

## 2021-01-20 NOTE — TELEPHONE ENCOUNTER
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

## 2021-01-20 NOTE — TELEPHONE ENCOUNTER
I looked at the results, but I do not manage anal/rectal HPV  I would trust what her doctors told her  She can schedule her annual exam and we can do a pap with HPV typing for her

## 2021-01-25 ENCOUNTER — TELEPHONE (OUTPATIENT)
Dept: OBGYN CLINIC | Facility: CLINIC | Age: 31
End: 2021-01-25

## 2021-01-25 ENCOUNTER — SOCIAL WORK (OUTPATIENT)
Dept: BEHAVIORAL/MENTAL HEALTH CLINIC | Facility: CLINIC | Age: 31
End: 2021-01-25
Payer: COMMERCIAL

## 2021-01-25 DIAGNOSIS — F50.9 EATING DISORDER, UNSPECIFIED TYPE: Primary | ICD-10-CM

## 2021-01-25 DIAGNOSIS — F41.1 GAD (GENERALIZED ANXIETY DISORDER): ICD-10-CM

## 2021-01-25 DIAGNOSIS — Z72.89 SELF-INJURIOUS BEHAVIOR: ICD-10-CM

## 2021-01-25 DIAGNOSIS — F33.1 MODERATE EPISODE OF RECURRENT MAJOR DEPRESSIVE DISORDER (HCC): ICD-10-CM

## 2021-01-25 PROCEDURE — 90834 PSYTX W PT 45 MINUTES: CPT | Performed by: SOCIAL WORKER

## 2021-01-25 NOTE — TELEPHONE ENCOUNTER
----- [facility] sent at 7/81/9892  5:03 PM EST -----  Regarding: FW: HPV  Contact: [phone]  You can start the vaccine earlier  It is ok to wait to Apr for annual      ----- Message -----  From: Evita Christina RN  Sent: 1/25/2021   4:55 PM EST  To: [facility]  Subject: FW: HPV                                          I told her she could start discuss and start the series at her annual in April  If you want I can bring her in sooner for the gardisil    Just let me know    SAMIA Gilmore  ----- Message -----  From: Tessy Mcleod  Sent: 1/25/2021   1:17 PM EST  To: [facility]  Subject: RE: HPV                                          Thank you! I want HPV testing and the Guardasil 9 shot  So I want this done before an annual  They told me I can't get in until April  If I have HPV in my anus, I could have it in my vagina as well  I need to make sure of this  I will go to any location and see any doctor  ----- Message -----  From: Evita Christina RN  Sent: 1/25/21, 9:46 AM  To: Tessy Mcleod  Subject: RE: HPV    Hi Catalina,    What I mean is that is not something she takes care of (anal/rectal HPV)- therefore you should follow advice of GI provider  She would like you to call and scheduled annual exam - [phone] opt 4  We can discuss HPV vaccine at annual exam     Nidia Childs      ----- Message -----       Michelle Shaffer       Sent:1/22/2021 12:16 PM EST         To:Deirdre Israel RN    Subject:HPV    Hello,    When you say manage, what does that mean? Does your office give the guardasil 9 shot       ----- Message -----       From:Deirdre Israel RN       Sent:1/22/2021  9:30 AM EST         Dr Pascale Still reviewed your  results, but she does not manage anal/rectal HPV  Recommend to trust what your doctors told you  Please call and schedule her annual exam,  we can do a pap with HPV typing for you  Brian Flatness
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
## 2021-01-25 NOTE — PSYCH
This note was not shared with the patient due to this is a psychotherapy note    Psychotherapy Provided: Individual Psychotherapy 50 minutes     Length of time in session: 50 minutes, follow up in 2 week    Goals addressed in session: Goal 1, Goal 2 and Goal 3      Pain:      none    0    Current suicide risk : Low     D: Met with Catalina individually  Session focused upon recent surgery; Dr  Also found cells indicating HPV  'I feel so gross'  "I spiraled a few time' - anger, irritability, 'having a pity party' regarding multiple medical issues  Feels not getting psych meds while in hospital is a strong contributing factor  Identified a lengthy period (still currently) refraining from cutting, purging, pulling hair, biting or digging in skin  Reviewed distress tolerance skills  Kathiearmond Mcgee mentioned she denied nightmares when seeing Dr Tom Correa recently but forgot her boyfriend has mentioned multiple times that she 'screams and shakes' in her sleep   He rubs her arms and she stops   Doesn't remember any of them though  Discussed unconscious self sabotaging behaviors with Arsen Azul- he is patient and calls Kathie Mcgee on it and she is quick to redirect  Anxious about returning to school - afraid for aggressive students and she is still healing  Denied SI    A: Kathie Mcgee presented with appropriate mood and affect  She is becoming insightful and thought stopping especially with self harm behaviors  Becoming more aware of self sabotage and Sunil Garcia on it has been great progress  P: Continue individual therapy    Behavioral Health Treatment Plan [facility]: Diagnosis and Treatment Plan explained to Martín Mosqueda relates understanding diagnosis and is agreeable to Treatment Plan   Yes

## 2021-01-25 NOTE — TELEPHONE ENCOUNTER
Patient states she has annual scheduled for April  Advised that is fine we can do pap and HPV testing at annual  She also would like to start Gardisil injection  Advised that can be started at this visit  Verbalized understanding

## 2021-01-29 RX ORDER — FLUCONAZOLE 150 MG/1
TABLET ORAL
COMMUNITY
Start: 2021-01-15 | End: 2022-02-07

## 2021-02-03 ENCOUNTER — PROCEDURE VISIT (OUTPATIENT)
Dept: NEUROLOGY | Facility: CLINIC | Age: 31
End: 2021-02-03
Payer: COMMERCIAL

## 2021-02-03 VITALS — DIASTOLIC BLOOD PRESSURE: 70 MMHG | SYSTOLIC BLOOD PRESSURE: 120 MMHG | HEART RATE: 75 BPM

## 2021-02-03 DIAGNOSIS — G43.709 CHRONIC MIGRAINE WITHOUT AURA WITHOUT STATUS MIGRAINOSUS, NOT INTRACTABLE: Primary | ICD-10-CM

## 2021-02-03 PROCEDURE — 64615 CHEMODENERV MUSC MIGRAINE: CPT | Performed by: PSYCHIATRY & NEUROLOGY

## 2021-02-03 NOTE — PROGRESS NOTES
Chemodenervation     Date/Time 2/3/2021 12:33 PM     Performed by  Tere Whaley MD     Authorized by Tere Whaley MD        Pre-procedure details      Prepped With: Alcohol     Anesthesia  (see MAR for exact dosages): Anesthesia method:  None   Botox     Botox Type:  Type A    Brand:  Botox    mL's of Botulinum Toxin:  155    Medication Administration:  100 Units onabotulinumtoxin A 100 units   Procedures     Botox Procedures: chronic headache      Indications: migraines     Injection Location      Head / Face:  L frontalis, R frontalis, R , L , L inferior cervical paraspinal, R inferior cervical paraspinal, L superior cervical paraspinal, R superior cervical paraspinal, procerus, L inferior occipitalis, R inferior occipitalis, L inferior trapezius, R inferior trapezius, L temporalis and R temporalis    L  injection amount:  5 unit(s)    R  injection amount:  5 unit(s)    L lateral frontalis:  5 unit(s)    R lateral frontalis:  5 unit(s)    L medial frontalis:  5 unit(s)    R medial frontalis:  5 unit(s)    L temporalis injection amount:  20 unit(s)    R temporalis injection amount:  20 unit(s)    Procerus injection amount:  5 unit(s)    L inferior occipitalis injection amount:  15 unit(s)    R inferior occipitalis injection amount:  15 unit(s)    L inferior cervical paraspinal injection amount:  5 unit(s)    R inferior cervical paraspinal injection amount:  5 unit(s)    L superior cervical paraspinal injection amount:  5 unit(s)    R superior cervical paraspinal injection amount:  5 unit(s)    L inferior trapezius injection amount:  15 unit(s)    R inferior trapezius injection amount:  15 unit(s)   Total Units     Total units used:  155    Total units discarded:  45   Post-procedure details      Chemodenervation:  Chronic migraine    Facial Nerve Location[de-identified]  Bilateral facial nerve    Patient tolerance of procedure:   Tolerated well, no immediate complications

## 2021-02-05 ENCOUNTER — OFFICE VISIT (OUTPATIENT)
Dept: INTERNAL MEDICINE CLINIC | Facility: CLINIC | Age: 31
End: 2021-02-05
Payer: COMMERCIAL

## 2021-02-05 VITALS
HEART RATE: 89 BPM | SYSTOLIC BLOOD PRESSURE: 122 MMHG | DIASTOLIC BLOOD PRESSURE: 72 MMHG | WEIGHT: 178.6 LBS | HEIGHT: 64 IN | BODY MASS INDEX: 30.49 KG/M2 | OXYGEN SATURATION: 99 % | TEMPERATURE: 97.3 F

## 2021-02-05 DIAGNOSIS — M06.09 RHEUMATOID ARTHRITIS OF MULTIPLE SITES WITH NEGATIVE RHEUMATOID FACTOR (HCC): ICD-10-CM

## 2021-02-05 DIAGNOSIS — F33.1 MODERATE EPISODE OF RECURRENT MAJOR DEPRESSIVE DISORDER (HCC): ICD-10-CM

## 2021-02-05 DIAGNOSIS — M45.9 ANKYLOSING SPONDYLITIS, UNSPECIFIED SITE OF SPINE (HCC): ICD-10-CM

## 2021-02-05 DIAGNOSIS — K62.3 RECTAL PROLAPSE: ICD-10-CM

## 2021-02-05 DIAGNOSIS — G43.709 CHRONIC MIGRAINE WITHOUT AURA WITHOUT STATUS MIGRAINOSUS, NOT INTRACTABLE: ICD-10-CM

## 2021-02-05 DIAGNOSIS — M79.7 FIBROMYALGIA: Primary | ICD-10-CM

## 2021-02-05 PROBLEM — R20.2 NUMBNESS AND TINGLING: Status: RESOLVED | Noted: 2017-09-29 | Resolved: 2021-02-05

## 2021-02-05 PROBLEM — K60.2 FISSURE, ANAL: Status: RESOLVED | Noted: 2019-04-08 | Resolved: 2021-02-05

## 2021-02-05 PROBLEM — Z79.899 LONG TERM USE OF DRUG: Status: RESOLVED | Noted: 2019-07-25 | Resolved: 2021-02-05

## 2021-02-05 PROBLEM — S09.90XA HEAD INJURY DUE TO TRAUMA: Status: RESOLVED | Noted: 2018-03-29 | Resolved: 2021-02-05

## 2021-02-05 PROBLEM — M75.111 INCOMPLETE TEAR OF RIGHT ROTATOR CUFF: Status: RESOLVED | Noted: 2018-01-05 | Resolved: 2021-02-05

## 2021-02-05 PROBLEM — R10.84 ABDOMINAL PAIN, GENERALIZED: Status: RESOLVED | Noted: 2018-08-14 | Resolved: 2021-02-05

## 2021-02-05 PROBLEM — R20.0 NUMBNESS AND TINGLING: Status: RESOLVED | Noted: 2017-09-29 | Resolved: 2021-02-05

## 2021-02-05 PROCEDURE — 99214 OFFICE O/P EST MOD 30 MIN: CPT | Performed by: NURSE PRACTITIONER

## 2021-02-05 PROCEDURE — 3725F SCREEN DEPRESSION PERFORMED: CPT | Performed by: NURSE PRACTITIONER

## 2021-02-05 NOTE — PROGRESS NOTES
Assessment/Plan:    Rectal prolapse  Doing well s/p delorme procedure  Sees Dr Sean Christensen  Chronic migraine without aura without status migrainosus, not intractable  Receiving botox injections with improvement  Ankylosing spondylitis ([address] )  On humira  Sees rheum  Rheumatoid arthritis of multiple sites with negative rheumatoid factor (HCC)  On humira and lyrica  Sees rheum  Moderate episode of recurrent major depressive disorder (HCC)  Stable  On cymbalta, buspar, and lamictal    Sees psychiatry     Fibromyalgia  Stable  Continue lyrica  BMI Counseling: Body mass index is 30 66 kg/m²  The BMI is above normal  Nutrition recommendations include reducing portion sizes, decreasing overall calorie intake and moderation in carbohydrate intake  Exercise recommendations include exercising 3-5 times per week  Diagnoses and all orders for this visit:    Fibromyalgia    Rheumatoid arthritis of multiple sites with negative rheumatoid factor (HCC)    Moderate episode of recurrent major depressive disorder (HCC)    Ankylosing spondylitis, unspecified site of spine (HCC)    Chronic migraine without aura without status migrainosus, not intractable    Rectal prolapse          Subjective:      Patient ID: Emily Mi is a 35 y o  female  Here today for follow up  Hasmukh Watkins is doing well  She recently had surgery for a prolapsed rectum  She did well and has had significant improvement with her bowels  She has no pain or bleeding  They did find HPV cells  She is scheduled to get Gardisil next week and see GYN in April  She has been seeing psychiatry  There have been some medication changes and she is feeling good  Her moods have improved  She reports improvement in her joint pain with Humira and lyrica  She does notice worsening joint pain if she misses a lyrica dose  She has had no migraines since starting on botox  She gets dull headaches occasionally but no migraines  The following portions of the patient's history were reviewed and updated as appropriate: allergies, current medications, past family history, past medical history, past social history, past surgical history and problem list     Review of Systems   Constitutional: Negative for activity change, appetite change, fatigue and unexpected weight change  Eyes: Negative for visual disturbance  Respiratory: Negative for cough, chest tightness, shortness of breath and wheezing  Cardiovascular: Negative for chest pain, palpitations and leg swelling  Gastrointestinal: Negative for abdominal pain, blood in stool, constipation and diarrhea  Genitourinary: Negative for difficulty urinating  Musculoskeletal: Positive for arthralgias and myalgias  Skin: Negative for rash  Neurological: Negative for dizziness, weakness, light-headedness and headaches  Psychiatric/Behavioral: Negative for dysphoric mood, sleep disturbance and suicidal ideas  The patient is not nervous/anxious  Objective:      /72   Pulse 89   Temp (!) 97 3 °F (36 3 °C)   Ht 5' 4" (1 626 m)   Wt 81 kg (178 lb 9 6 oz)   SpO2 99%   BMI 30 66 kg/m²          Physical Exam  Vitals signs reviewed  Constitutional:       Appearance: She is well-developed  HENT:      Head: Normocephalic and atraumatic  Eyes:      Conjunctiva/sclera: Conjunctivae normal       Pupils: Pupils are equal, round, and reactive to light  Cardiovascular:      Rate and Rhythm: Normal rate and regular rhythm  Heart sounds: Normal heart sounds  Pulmonary:      Effort: Pulmonary effort is normal       Breath sounds: Normal breath sounds  Abdominal:      General: Bowel sounds are normal       Palpations: Abdomen is soft  Musculoskeletal: Normal range of motion  Skin:     General: Skin is warm and dry  Neurological:      Mental Status: She is alert and oriented to person, place, and time     Psychiatric:         Behavior: Behavior normal

## 2021-02-08 ENCOUNTER — TELEPHONE (OUTPATIENT)
Dept: OBGYN CLINIC | Facility: CLINIC | Age: 31
End: 2021-02-08

## 2021-02-08 NOTE — TELEPHONE ENCOUNTER
Reviewed provider recommendations - advised she did get Quadrivalent not sure of the risks/benefits of now getting gardisil 9 - it does cover more strains  States she will wait for now and discuss with provider at annual exam  appt cancelled for today  Verbalized understanding

## 2021-02-08 NOTE — TELEPHONE ENCOUNTER
Patient has an appt today for Gardisil vaccine but it looks like she had in the past She wanted to know if this is a different vaccine  She did  not get them on the correct schedule  Look like she had  vaccine 3/29/11, 12/18/13 and 2/24/14  This is a very strange schedule  I thought last two have to be at least 4 months apart  Routing to provider for advice

## 2021-02-08 NOTE — TELEPHONE ENCOUNTER
It is still effective not on the same schedule, as long as it was completed  It was the quadrivalent, don't know risk/benefits of now getting the gardisil 9, but does cover more strains

## 2021-02-17 DIAGNOSIS — B37.3 YEAST INFECTION OF THE VAGINA: Primary | ICD-10-CM

## 2021-02-17 RX ORDER — FLUCONAZOLE 150 MG/1
150 TABLET ORAL ONCE
Qty: 1 TABLET | Refills: 0 | Status: SHIPPED | OUTPATIENT
Start: 2021-02-17 | End: 2021-02-17

## 2021-03-06 DIAGNOSIS — M79.7 FIBROMYALGIA: ICD-10-CM

## 2021-03-08 DIAGNOSIS — B37.3 YEAST VAGINITIS: Primary | ICD-10-CM

## 2021-03-08 RX ORDER — FLUCONAZOLE 150 MG/1
150 TABLET ORAL ONCE
Qty: 1 TABLET | Refills: 0 | Status: SHIPPED | OUTPATIENT
Start: 2021-03-08 | End: 2021-03-08

## 2021-03-09 DIAGNOSIS — M79.7 FIBROMYALGIA: ICD-10-CM

## 2021-03-11 NOTE — PROGRESS NOTES
Assessment and Plan:   Patient is a 35 old female presents for rheumatology follow-up for seronegative RA  She has been very stable on Humira over time and I have not appreciated any clinical Disease activity in her joints  She again look stable today on exam and we will continue with Humira  We will do some basic blood work before her next visit in about 6 months      Plan:  Diagnoses and all orders for this visit:    Rheumatoid arthritis of multiple sites with negative rheumatoid factor (HCC)  -     CBC and differential  -     Comprehensive metabolic panel  -     C-reactive protein  -     Sedimentation rate, automated    Fibromyalgia    High risk medication use  -     CBC and differential  -     Comprehensive metabolic panel  -     C-reactive protein  -     Sedimentation rate, automated        Follow-up plan: 6 months       Rheumatic Disease Summary:  1  Prior dx of Ankylosing spondylitis vs seronegative RA  -Multiple prior rheumatologists including Ghanshyam Mary, Dr Nance [facility]), then est with Dr Claritza Mcknight 7/2015 for previous dx of HLA-B27 negative AS, Sjogrens, and fibromyalgia; cousin with AS, mom with RA    -Lost to f/u with Claritza Mcknight since 4/2016, then followed with [facility] rheum (did not think she had AS, possibly seroneg, thought mainly fibromyalgia), now returning to [facility] with me on 2/25/19  -Labs 7/2017: negative HLA-B27, RF, CCP, DORON, dsDNA, SSA, SSB, smith, RNP, C3/4, Hep B/C  -XR SI/lumbar 8/2016: mild facet arthropathy, normal SI with erosions or ankylosis   -XR hands/feet 6/2017: normal, no erosions   -MRI cervical spine 8/2017: normal   -Meds history:    -Multiple NSAIDs (nabumetone, meloxicam, naproxen, diclofenac, daypro, lodine)    -Sulfasalazine    -Oral MTX 15mg/week (2015-4/2016)    -Humira (9/2015-4/2016, effective but afraid needles)    -Otezla (2016, D/C due to vomiting and depression)    -Simponi Aria (2016, x6 months, then stopped working)    -Xeljanz for ?seroneg RA ([facility]) (1/2018-2/2019, ineffective)  -Visit 2/25/19: no active synovitis to suggest RA, however patient insistent on stopping xeljanz and going back on humira; exam most notable for fibromyalgia  -Humira started 5/2019 with improvement in generalized pain    -Visit 12/3/19: stable on humira, no changes   -Visit 9/14/20: achilles tendonitis, medrol dosepack, cont humira   -Visit 3/15/21: stable on humira, no active disease   2  Fibromyalgia   -Lyrica beneficial   -Suggested discussing with psych about switching to Cymbalta  3  Drug toxicity monitoring   4  Comorbidities: migraines, small right supraspinatus tear, anxiety, depression,s/p rectal prolapse surgery      ZANE Guzman is a 35 y o   female who presents for rheumatology follow-up regarding seronegative RA  She has been stable for quite some time on Humira  Last visit she was having issues with her heel and I suspected possible Achilles tendinitis  Since then she has seen Sports Medicine who recommended physical therapy for this  She also had a surgery for rectal prolapse in December  Patient reports that she continues to do well on Humira  Overall she feels about the same and stable  Morning stiffness in her joints is only for 10-15 minutes and then resolves  No recent persistent joint swelling  Not requiring any over-the-counter NSAIDs or Tylenol  Takes some on occasion for her fibromyalgia pain  She is recovering well from her surgery in December  The following portions of the patient's history were reviewed and updated as appropriate: allergies, current medications, past family history, past medical history, past social history, past surgical history and problem list     Review of Systems:   Review of Systems   Constitutional: Negative for fatigue and unexpected weight change  HENT: Negative for mouth sores  Respiratory: Negative for cough and shortness of breath  Gastrointestinal: Negative for constipation and diarrhea  Musculoskeletal: Positive for arthralgias  Negative for back pain, joint swelling and myalgias  Skin: Negative for color change and rash  Neurological: Negative for weakness  Psychiatric/Behavioral: Negative for sleep disturbance         Home Medications:     Current Outpatient Medications:     Adalimumab (Humira Pen) 40 MG/0 8ML PNKT, Inject 0 8 mL (40 mg total) under the skin every 14 (fourteen) days, Disp: 5 each, Rfl: 3    BOTOX 100 units, INJECT UP  UNITS INTO VARIOUS HEAD AND NECK MUSCLES EVERY 3 MONTHS  (Patient taking differently: No sig reported), Disp: 2 each, Rfl: 0    busPIRone (BUSPAR) 15 mg tablet, Take 1 tablet (15 mg total) by mouth 2 (two) times a day, Disp: 180 tablet, Rfl: 1    Clindamycin Phos-Benzoyl Perox gel, EVERY NIGHT AT BEDTIME TO FACE ACNE  KEEP IN THE REFRIGERATOR, Disp: , Rfl:     DULoxetine (CYMBALTA) 30 mg delayed release capsule, Take 1 capsule (30 mg total) by mouth daily at bedtime In addition to 60 mg , Disp: 90 capsule, Rfl: 1    DULoxetine (CYMBALTA) 60 mg delayed release capsule, Take 1 capsule (60 mg total) by mouth daily at bedtime, Disp: 90 capsule, Rfl: 1    fluconazole (DIFLUCAN) 150 mg tablet, TAKE 1 TABLET BY MOUTH AS ONE DOSE, Disp: , Rfl:     lamoTRIgine (LaMICtal) 100 mg tablet, Take 1 tablet (100 mg total) by mouth daily, Disp: 90 tablet, Rfl: 1    levonorgestrel (MIRENA) 20 MCG/24HR IUD, 1 each by Intrauterine route once, Disp: , Rfl:     pregabalin (LYRICA) 150 mg capsule, Take 1 capsule (150 mg total) by mouth 2 (two) times a day, Disp: 180 capsule, Rfl: 0    Current Facility-Administered Medications:     Botulinum Toxin Type A SOLR 200 Units, 200 Units, Injection, Q3 Months, Carolina Rice MD, 200 Units at 01/28/20 1613    Botulinum Toxin Type A SOLR 200 Units, 200 Units, Injection, Q3 Months, Carolina Rice MD, 200 Units at 04/30/20 1636    Botulinum Toxin Type A SOLR 200 Units, 200 Units, Injection, Q3 Months, Carolina Rice, MD, 200 Units at 07/22/20 1224    Botulinum Toxin Type A SOLR 200 Units, 200 Units, Injection, Q3 Months, Chato Gloria MD, 200 Units at 02/03/21 1313    Objective:    Vitals:    03/15/21 1551   Pulse: 84   Weight: 81 kg (178 lb 9 2 oz)   Height: 5' 4" (1 626 m)       Physical Exam  Constitutional:       General: She is not in acute distress  Appearance: She is well-developed  HENT:      Head: Normocephalic and atraumatic  Eyes:      General: Lids are normal  No scleral icterus  Conjunctiva/sclera: Conjunctivae normal    Neck:      Musculoskeletal: Neck supple  Pulmonary:      Effort: Pulmonary effort is normal  No tachypnea, accessory muscle usage or respiratory distress  Musculoskeletal:      Comments: No joint swelling or synovitis anywhere  Skin:     General: Skin is dry  Findings: No rash  Neurological:      Mental Status: She is alert  Psychiatric:         Behavior: Behavior normal  Behavior is cooperative         Musculoskeletal--Peripheral Joint Exam:  Physical Exam     ANAYA-28 tender joint count: 0  ANAYA-28 swollen joint count: 0      Labs:   Component      Latest Ref Rng & Units 12/24/2020   WBC      4 31 - 10 16 Thousand/uL 15 01 (H)   Red Blood Cell Count      3 81 - 5 12 Million/uL 4 57   Hemoglobin      11 5 - 15 4 g/dL 12 9   HCT      34 8 - 46 1 % 38 8   MCV      82 - 98 fL 85   MCH      26 8 - 34 3 pg 28 2   MCHC      31 4 - 37 4 g/dL 33 2   RDW      11 6 - 15 1 % 12 1   MPV      8 9 - 12 7 fL 10 8   Platelet Count      811 - 390 Thousands/uL 320   nRBC      /100 WBCs 0   Neutrophils %      43 - 75 % 86 (H)   Immat GRANS %      0 - 2 % 1   Lymphocytes Relative      14 - 44 % 8 (L)   Monocytes Relative      4 - 12 % 5   Eosinophils      0 - 6 % 0   Basophils Relative      0 - 1 % 0   Absolute Neutrophils      1 85 - 7 62 Thousands/µL 12 96 (H)   Immature Grans Absolute      0 00 - 0 20 Thousand/uL 0 07   Lymphocytes Absolute      0 60 - 4 47 Thousands/µL 1 21   Absolute Monocytes      0 17 - 1 22 Thousand/µL 0 75   Absolute Eosinophils      0 00 - 0 61 Thousand/µL 0 00   Basophils Absolute      0 00 - 0 10 Thousands/µL 0 02   Sodium      136 - 145 mmol/L 137   Potassium      3 5 - 5 3 mmol/L 4 0   Chloride      100 - 108 mmol/L 102   CO2      21 - 32 mmol/L 25   Anion Gap      4 - 13 mmol/L 10   BUN      5 - 25 mg/dL 6   Creatinine      0 60 - 1 30 mg/dL 0 72   Glucose, Random      65 - 140 mg/dL 162 (H)   Calcium      8 3 - 10 1 mg/dL 9 0   AST      5 - 45 U/L 18   ALT      12 - 78 U/L 30   Alkaline Phosphatase      46 - 116 U/L 45 (L)   Total Protein      6 4 - 8 2 g/dL 7 3   Albumin      3 5 - 5 0 g/dL 4 0   TOTAL BILIRUBIN      0 20 - 1 00 mg/dL 0 19 (L)   eGFR      ml/min/1 73sq m 114

## 2021-03-15 ENCOUNTER — OFFICE VISIT (OUTPATIENT)
Dept: RHEUMATOLOGY | Facility: CLINIC | Age: 31
End: 2021-03-15
Payer: COMMERCIAL

## 2021-03-15 VITALS — BODY MASS INDEX: 30.49 KG/M2 | WEIGHT: 178.57 LBS | HEART RATE: 84 BPM | HEIGHT: 64 IN

## 2021-03-15 DIAGNOSIS — M06.09 RHEUMATOID ARTHRITIS OF MULTIPLE SITES WITH NEGATIVE RHEUMATOID FACTOR (HCC): Primary | ICD-10-CM

## 2021-03-15 DIAGNOSIS — M79.7 FIBROMYALGIA: ICD-10-CM

## 2021-03-15 DIAGNOSIS — Z79.899 HIGH RISK MEDICATION USE: ICD-10-CM

## 2021-03-15 PROCEDURE — 3008F BODY MASS INDEX DOCD: CPT | Performed by: INTERNAL MEDICINE

## 2021-03-15 PROCEDURE — 1036F TOBACCO NON-USER: CPT | Performed by: INTERNAL MEDICINE

## 2021-03-15 PROCEDURE — 99214 OFFICE O/P EST MOD 30 MIN: CPT | Performed by: INTERNAL MEDICINE

## 2021-03-15 RX ORDER — PREGABALIN 150 MG/1
150 CAPSULE ORAL 2 TIMES DAILY
Qty: 180 CAPSULE | Refills: 0 | Status: SHIPPED | OUTPATIENT
Start: 2021-03-15 | End: 2021-06-08 | Stop reason: SDUPTHER

## 2021-03-15 RX ORDER — PREGABALIN 150 MG/1
CAPSULE ORAL
Qty: 180 CAPSULE | Refills: 0 | OUTPATIENT
Start: 2021-03-15

## 2021-03-18 ENCOUNTER — TELEPHONE (OUTPATIENT)
Dept: NEUROLOGY | Facility: CLINIC | Age: 31
End: 2021-03-18

## 2021-03-18 NOTE — TELEPHONE ENCOUNTER
Ruiz Kim,    Please reach out to the patient to schedule her yearly Botox follow-up  Patient was last seen 6/2020  Will need updated clinical notes to submit for future authorizations      Thank you    Idania Suarez

## 2021-03-19 ENCOUNTER — TELEPHONE (OUTPATIENT)
Dept: NEUROLOGY | Facility: CLINIC | Age: 31
End: 2021-03-19

## 2021-03-19 NOTE — TELEPHONE ENCOUNTER
Type Date User Summary Attachment   General 03/18/2021 11:32 AM Nevin Gorman care coordination  -   Note    Botox- authorization #: [id number]- last visit- valid from 6/15/2020 until 6/15/2021   Please use [facility]      Thank you,     Katerine Herrera

## 2021-03-23 NOTE — TELEPHONE ENCOUNTER
Spoke with patient made appointment with Dr Payton Hernandez on 4/06/2021 [facility] [address]

## 2021-03-31 NOTE — TELEPHONE ENCOUNTER
[id number]  spoke with Mariangel Salter, advised I was calling to initiate a refill request for patient's botox medication  Mariangel Salter advised there are no remaining refills left on current prescription, Mariangel Salter transferred me to a pharmacist to provide a verbal prescription  Spoke with pharmacist Goyo, advised I was calling to provide a verbal new prescription for Botox 100 units QTY 2 with 3 refills for Dx [id number] under Carolina Rice  Patient's profile will be sent to insurance verification   Will call for a status check in 2 days

## 2021-04-02 NOTE — TELEPHONE ENCOUNTER
Called [facility] specialty pharmacy, spoke with Blake Farrell advised I was calling to check the status of patient's botox order   Blake Farrell states that patient's order is still in insurance verification, will call for a status check in 2 days

## 2021-04-06 ENCOUNTER — TELEMEDICINE (OUTPATIENT)
Dept: NEUROLOGY | Facility: CLINIC | Age: 31
End: 2021-04-06
Payer: COMMERCIAL

## 2021-04-06 DIAGNOSIS — G43.709 CHRONIC MIGRAINE WITHOUT AURA WITHOUT STATUS MIGRAINOSUS, NOT INTRACTABLE: Primary | ICD-10-CM

## 2021-04-06 PROCEDURE — 99213 OFFICE O/P EST LOW 20 MIN: CPT | Performed by: PSYCHIATRY & NEUROLOGY

## 2021-04-06 PROCEDURE — 1036F TOBACCO NON-USER: CPT | Performed by: PSYCHIATRY & NEUROLOGY

## 2021-04-06 NOTE — TELEPHONE ENCOUNTER
Called [facility] specialty pharmacy spoke with [facility], advised I was calling to check the status of patient's profile    [facility] states that patient's profile is still in major medical, she did advised that patient's profile has been assigned to a member of the major medical team will call for a status check in 2 days

## 2021-04-06 NOTE — ASSESSMENT & PLAN NOTE
Preventive medications  - continue with botox q3 months  - patient has jaw clenching - we will inject previously muscles for chronic migraines  Botox - c/w botox q3 months  Counseling / Headache management instructions-  - When patient has a moderate to severe headache, they should seek rest, initiate relaxation and apply cold compresses to the head  - Maintain regular sleep schedule  Adults need at least 7-8 hours of uninterrupted a night  - Limit over the counter medications such as Tylenol, Ibuprofen, Aleve, Excedrin  (No more than 3 times a week)  - Maintain headache diary  We discussed an VICENTE for a smart phone is "Migraine darin"  - Limit caffeine to 1-2 cups 8 to 16 oz a day or less, none after 3pm   - Avoid dietary trigger  (aged cheese, peanuts, MSG, aspartame and nitrates)  - Patient is to have regular frequent meals to prevent headache onset  - Please drink at least 64 ounces of water a day to help remain hydrated

## 2021-04-06 NOTE — PROGRESS NOTES
Virtual Regular Visit      Assessment/Plan:    Problem List Items Addressed This Visit        Cardiovascular and Mediastinum    Chronic migraine without aura without status migrainosus, not intractable - Primary     Preventive medications  - continue with botox q3 months  - patient has jaw clenching - we will inject previously muscles for chronic migraines  Botox - c/w botox q3 months  Counseling / Headache management instructions-  - When patient has a moderate to severe headache, they should seek rest, initiate relaxation and apply cold compresses to the head  - Maintain regular sleep schedule  Adults need at least 7-8 hours of uninterrupted a night  - Limit over the counter medications such as Tylenol, Ibuprofen, Aleve, Excedrin  (No more than 3 times a week)  - Maintain headache diary  We discussed an VICENTE for a smart phone is "Migraine darin"  - Limit caffeine to 1-2 cups 8 to 16 oz a day or less, none after 3pm   - Avoid dietary trigger  (aged cheese, peanuts, MSG, aspartame and nitrates)  - Patient is to have regular frequent meals to prevent headache onset  - Please drink at least 64 ounces of water a day to help remain hydrated  Follow up -    I would be happy to see the patient sooner if any new questions/concerns arise  Patient/Guardian was advised to the call the office if they have any questions and concerns in the meantime  Patient/Guardian does understand that if they have any new stroke like symptoms such as facial droop on one side, weakness/paralysis on either side, speech trouble, numbness on one side, balance issues, any vision changes, extreme dizziness or any new headache, to call 9-1-1 immediately or to proceed to the nearest ER immediately          Reason for visit is   Chief Complaint   Patient presents with    Virtual Regular Visit        Encounter provider Yolanda Palacios MD    Provider located at [address] [place] [address] ARIANNA  Unruly Hsu [id number]      Recent Visits  No visits were found meeting these conditions  Showing recent visits within past 7 days and meeting all other requirements     Future Appointments  No visits were found meeting these conditions  Showing future appointments within next 150 days and meeting all other requirements        The patient was identified by name and date of birth  Brandon Quinones was informed that this is a telemedicine visit and that the visit is being conducted through [address] [place] [address] and patient was informed that this is not a secure, HIPAA-compliant platform  She agrees to proceed     My office door was closed  No one else was in the room  She acknowledged consent and understanding of privacy and security of the video platform  The patient has agreed to participate and understands they can discontinue the visit at any time  Patient is aware this is a billable service  Subjective  Brandon Quinones is a 35 y o  female who is    She does notice an improvement in her headache since her last botox treatment  She tolerated the botox tx fine without any issues  She is having 4-5 headaches a month, and she says that she has jaw clenching, and she does not even notice  She says that because of clenching she notices that her headaches are worse  She says that it feels the same, and it is not a full on migraine  She says that she does not have any other issues  She is currently not on any prophylactic medications at this time         HPI     Past Medical History:   Diagnosis Date    Abdominal pain, generalized 8/14/2018    Added automatically from request for surgery [id number]    Anal pain 8/10/2020    Anxiety     Chronic migraine 1/15/2018    Chronic periscapular pain on right side 8/17/2018    Depression     Eating disorder     occasional purging    Fibromyalgia, primary     Fissure, anal 4/8/2019    Head injury due to trauma 3/29/2018    Herpes     Incomplete tear of right rotator cuff 1/5/2018    Irritable bowel syndrome (IBS)     Long term use of drug 7/25/2019    Microscopic hematuria 8/11/2017    Myofascial pain 7/30/2015    Numbness and tingling 9/29/2017    PONV (postoperative nausea and vomiting)     PTSD (post-traumatic stress disorder)     [facility]he[facility]oid [facility]) [facility] [address] [facility]     Rectal bleeding 4/8/2019    Right ovarian cyst 8/14/2017    Substance abuse ([facility] )     marijuana use    Varicella     Yeast infection     chronic       Past Surgical History:   Procedure Laterality Date    COLONOSCOPY      MANDIBLE SURGERY      GA COLONOSCOPY FLX DX W/COLLJ SPEC WHEN PFRMD N/A 10/8/2018    Procedure: COLONOSCOPY;  Surgeon: Tamia Thomas MD;  [facility];   Service: Gastroenterology    GA FIX RECTAL PROLAPSE,PERINEAL APPR N/A 12/23/2020    Procedure: Jasper Mccray;  Surgeon: Jose Armando Gonzalez MD;  [facility];  Service: Colorectal    REDUCTION MAMMAPLASTY         Current Outpatient Medications   Medication Sig Dispense Refill    Adalimumab (Humira Pen) 40 MG/0 8ML PNKT Inject 0 8 mL (40 mg total) under the skin every 14 (fourteen) days 5 each 3    BOTOX 100 units INJECT UP  UNITS INTO VARIOUS HEAD AND NECK MUSCLES EVERY 3 MONTHS  (Patient taking differently: No sig reported) 2 each 0    Clindamycin Phos-Benzoyl Perox gel EVERY NIGHT AT BEDTIME TO FACE ACNE  KEEP IN THE REFRIGERATOR      DULoxetine (CYMBALTA) 30 mg delayed release capsule Take 1 capsule (30 mg total) by mouth daily at bedtime In addition to 60 mg  90 capsule 1    DULoxetine (CYMBALTA) 60 mg delayed release capsule Take 1 capsule (60 mg total) by mouth daily at bedtime 90 capsule 1    fluconazole (DIFLUCAN) 150 mg tablet TAKE 1 TABLET BY MOUTH AS ONE DOSE      lamoTRIgine (LaMICtal) 100 mg tablet Take 1 tablet (100 mg total) by mouth daily 90 tablet 1    levonorgestrel (MIRENA) 20 MCG/24HR IUD 1 each by Intrauterine route once      pregabalin (LYRICA) 150 mg capsule Take 1 capsule (150 mg total) by mouth 2 (two) times a day 180 capsule 0    busPIRone (BUSPAR) 15 mg tablet Take 1 tablet (15 mg total) by mouth 2 (two) times a day 180 tablet 1     Current Facility-Administered Medications   Medication Dose Route Frequency Provider Last Rate Last Admin    Botulinum Toxin Type A SOLR 200 Units  200 Units Injection Q3 Months Arsen Xie MD   200 Units at 01/28/20 1613    Botulinum Toxin Type A SOLR 200 Units  200 Units Injection Q3 Months Arsen Xie MD   200 Units at 04/30/20 1636    Botulinum Toxin Type A SOLR 200 Units  200 Units Injection Q3 Months Arsen Xie MD   200 Units at 07/22/20 1224    Botulinum Toxin Type A SOLR 200 Units  200 Units Injection Q3 Months Arsen Xie MD   200 Units at 02/03/21 1313        No Known Allergies    Review of Systems   Constitutional: Negative  Negative for appetite change and fever  HENT: Negative  Negative for hearing loss, tinnitus, trouble swallowing and voice change  Eyes: Negative  Negative for photophobia and pain  Respiratory: Negative  Negative for shortness of breath  Cardiovascular: Negative  Negative for palpitations  Gastrointestinal: Negative  Negative for nausea and vomiting  Endocrine: Negative  Negative for cold intolerance  Genitourinary: Negative  Negative for dysuria, frequency and urgency  Musculoskeletal: Negative  Negative for myalgias and neck pain  Skin: Negative  Negative for rash  Neurological: Negative  Negative for dizziness, tremors, seizures, syncope, facial asymmetry, speech difficulty, weakness, light-headedness, numbness and headaches  Hematological: Negative  Does not bruise/bleed easily  Psychiatric/Behavioral: Negative  Negative for confusion, hallucinations and sleep disturbance  Video Exam    There were no vitals filed for this visit      Physical Exam     I spent 10 minutes directly with the patient during this visit      VIRTUAL VISIT DISCLAIMER    Natalyalinus Joni acknowledges that she has consented to an online visit or consultation  She understands that the online visit is based solely on information provided by her, and that, in the absence of a face-to-face physical evaluation by the physician, the diagnosis she receives is both limited and provisional in terms of accuracy and completeness  This is not intended to replace a full medical face-to-face evaluation by the physician  Brandon Quinones understands and accepts these terms

## 2021-04-08 DIAGNOSIS — F33.1 MODERATE EPISODE OF RECURRENT MAJOR DEPRESSIVE DISORDER (HCC): ICD-10-CM

## 2021-04-08 RX ORDER — BUSPIRONE HYDROCHLORIDE 15 MG/1
15 TABLET ORAL 2 TIMES DAILY
Qty: 180 TABLET | Refills: 1 | Status: SHIPPED | OUTPATIENT
Start: 2021-04-08 | End: 2021-04-09 | Stop reason: SDUPTHER

## 2021-04-08 RX ORDER — BUSPIRONE HYDROCHLORIDE 15 MG/1
TABLET ORAL
Qty: 180 TABLET | Refills: 3 | Status: SHIPPED | OUTPATIENT
Start: 2021-04-08 | End: 2021-04-08 | Stop reason: SDUPTHER

## 2021-04-08 NOTE — TELEPHONE ENCOUNTER
Called [facility] spoke with [facility] [place], advised I was calling to check the status of patient's botox order  [facility] [place] states that patient's profile is still in major medical verification  I advised [facility] [place] this has been in major medical for almost 1 week, [facility] [place] states that patient's profile is still marked as a standard priority which can take up to 15 days to verifiy   She placed me on hold and escalated patient's profile to urgent priority  Will call for a status check in 2 days

## 2021-04-09 ENCOUNTER — TELEPHONE (OUTPATIENT)
Dept: PSYCHIATRY | Facility: CLINIC | Age: 31
End: 2021-04-09

## 2021-04-09 DIAGNOSIS — F33.1 MODERATE EPISODE OF RECURRENT MAJOR DEPRESSIVE DISORDER (HCC): ICD-10-CM

## 2021-04-09 RX ORDER — BUSPIRONE HYDROCHLORIDE 15 MG/1
15 TABLET ORAL 2 TIMES DAILY
Qty: 180 TABLET | Refills: 1 | Status: SHIPPED | OUTPATIENT
Start: 2021-04-09 | End: 2022-02-07 | Stop reason: SDUPTHER

## 2021-04-09 NOTE — TELEPHONE ENCOUNTER
Received a fax from [employer] [facility] for a medical clarification on Catalina's Buspirone  They received a conflicting response to their renewal request  It was indicating that the medication was approved and denied  They want to know if the buspirone should be filled or held  Fax was placed in your mailbox

## 2021-04-09 NOTE — TELEPHONE ENCOUNTER
Placed in Dr Nandini Fernández' inbox for clarification regarding where script should go and signature

## 2021-04-12 NOTE — TELEPHONE ENCOUNTER
Called [facility] pharmacy spoke with Roxanna, advised I was calling to check the status of patient's botox order  Roxanna states that patient's profile is still in major medical verification  I advised Roxanna this has been in major medical for almost 2 week, and we were told on Thur 4/8 that profile would be marked as urgent  Roxanna states that profile is still marked as standard, she states she will send an email to the insurance team to have patient's profile expedited, she will also irena patient's profile as urgent   Will call for a status check in 2 days

## 2021-04-14 NOTE — TELEPHONE ENCOUNTER
[address] , spoke with Clement, advised I was calling to check the status of patient's botox order  Clement states that patient's profile is still in major medical verification, I advised Clement this has been in major medical for over 2 weeks now  Clement states that she will irena patient's profile as STAT with a needs by date of 4/16 she is also sending an email to the escalation team to ensure that patient's profile is assigned to someone and will be worked on    Will call for a status check in 2 days

## 2021-04-16 NOTE — TELEPHONE ENCOUNTER
Received voicemail from Latricia from [facility], she states that they need new prior authorization information for patient's [employer], she states the authorization they have on file is   Authorization on file is valid until 6/15/2021, will call [employer] and provide them authorization information

## 2021-04-16 NOTE — TELEPHONE ENCOUNTER
Called [facility] pharmacy spoke with Jacinto advised I was returning a voicemail received requesting new prior authorization information, Jacinto transferred me to the insurance department to provide this information  Spoke with Toni Lee in insurance, advised we received a voicemail from Luis Carreno requesting new prior authorization information  I advised that we have authorization on file until 6/15/2021  Provided Toni Lee with authorization information, she states that they have this authorization information on file, however authorization is only approved for 620 units  Toni Lee states they have already refilled mediation 3 times with this authorization so 600 units of the authorization have already been used and only 20 units remains, since refill request is for 200 units there are not enough units remaining on authorization  We will need to submit for a new authorization in order to refill patient's medication    Will submit for new prior auth through availity portal

## 2021-04-19 NOTE — TELEPHONE ENCOUNTER
Prior auth submitted to [employer] 4/19/21 via Katelyn Keith, pending auth # [id number], will await approval denial letter, will call for a status check in 2/3 days if determination is not yet received

## 2021-04-20 ENCOUNTER — ANNUAL EXAM (OUTPATIENT)
Dept: OBGYN CLINIC | Facility: CLINIC | Age: 31
End: 2021-04-20
Payer: COMMERCIAL

## 2021-04-20 VITALS
WEIGHT: 181.2 LBS | SYSTOLIC BLOOD PRESSURE: 124 MMHG | HEIGHT: 64 IN | BODY MASS INDEX: 30.93 KG/M2 | DIASTOLIC BLOOD PRESSURE: 72 MMHG

## 2021-04-20 DIAGNOSIS — Z30.431 IUD CHECK UP: ICD-10-CM

## 2021-04-20 DIAGNOSIS — Z12.4 ENCOUNTER FOR SCREENING FOR CERVICAL CANCER: ICD-10-CM

## 2021-04-20 DIAGNOSIS — Z01.419 WELL FEMALE EXAM WITH ROUTINE GYNECOLOGICAL EXAM: Primary | ICD-10-CM

## 2021-04-20 DIAGNOSIS — Z11.51 SCREENING FOR HUMAN PAPILLOMAVIRUS: ICD-10-CM

## 2021-04-20 PROBLEM — Z79.899 ADALIMUMAB (HUMIRA) LONG-TERM USE: Status: ACTIVE | Noted: 2021-04-20

## 2021-04-20 PROCEDURE — 3008F BODY MASS INDEX DOCD: CPT | Performed by: PSYCHIATRY & NEUROLOGY

## 2021-04-20 PROCEDURE — G0145 SCR C/V CYTO,THINLAYER,RESCR: HCPCS | Performed by: OBSTETRICS & GYNECOLOGY

## 2021-04-20 PROCEDURE — S0612 ANNUAL GYNECOLOGICAL EXAMINA: HCPCS | Performed by: OBSTETRICS & GYNECOLOGY

## 2021-04-20 PROCEDURE — 87624 HPV HI-RISK TYP POOLED RSLT: CPT | Performed by: OBSTETRICS & GYNECOLOGY
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
## 2021-04-21 NOTE — TELEPHONE ENCOUNTER
Authorization is still pending per [employer] via [employer]  Will check in again in 1-2 days for another update

## 2021-04-22 LAB
HPV HR 12 DNA CVX QL NAA+PROBE: NEGATIVE
HPV16 DNA CVX QL NAA+PROBE: NEGATIVE
HPV18 DNA CVX QL NAA+PROBE: NEGATIVE

## 2021-04-22 NOTE — TELEPHONE ENCOUNTER
I wont have time to do the peer to peer today, anyway any of the Aps can do it, im in resident clinic all day?

## 2021-04-22 NOTE — TELEPHONE ENCOUNTER
Per [employer] via [employer] Botox authorization has been denied  Will need to call [employer] for the denial reason  [address] spoke with Mauri- I advised her I was calling in regards to a Botox denial  I advised her that this was submitted via [employer] and I need to know the denial reason  She was able to locate the denial- she provided me with the following denial reason:    Denial Reason: The requested dosage is 200 units- this dosage exceeds the FDA approved dosage on the product label of 155 units every 3 months  A peer to peer can be completed to get this denial overturned  The peer to peer must be completed within 15 days of the date of denial      Dr Gayatri Oakley,    Please see above- Patients Botox was denied because we requested 200 units  Patient's older authorizations were approved for 200 units  However, there guidelines changed and they are now only approving 155 units  A peer to peer can be completed to get this overturned  I would advise the peer to peer physician that patient was previously getting 200 units  If the 200 units was more beneficial for the patient then 155 units they may approve the 200 units instead, if not we would have to proceed with the 155 units  Please let me know the outcome  Peer to peer phone: [phone] option 2: provider, option 2: peer to peer  Peer to peer must be completed within 15 days of denial (denied on 4/22/21)    I will CC Joann Rm on this so she can help set up the peer to peer and get it started for you if needed  Patient is currently scheduled on 5/5/21 we may need to r/s based on the outcome of this peer to peer       Thank you    Tiny Lima

## 2021-04-22 NOTE — TELEPHONE ENCOUNTER
Desirae Wellington,    Would you be able to assist with this peer to peer? Patient's Botox got denied due to [address] only approving 155 units  Patient was previously on 200 units of Botox with good response  Please let me know the outcome of this peer to peer      Phone: [phone] option 2- provider option 2- peer to peer    Thank you    Jeanette Morris

## 2021-04-23 NOTE — TELEPHONE ENCOUNTER
Received a call back from the patient- she states she called [facility] and they advised her that we should proceed with the appeal  The patient advised me that she is going to submit an appeal as well  I advised her that we would initiate this appeal on Monday and follow-up with her once we had additional information

## 2021-04-23 NOTE — TELEPHONE ENCOUNTER
Received a call back from the patient- she is aware that her Botox authorization has been denied  She is aware that her policy changed and they would only approve 155 units  I advised her that we are currently working on getting an appeal for this  Patient verbally understood and is going to try [employer] on her end as well to find out why this happened and what else we can do  She is also going to see if they can cancel out this denial so we can possibly reapply for the 155 units  She will call us back with an update  I advised her that we would try and call [employer] as well to find out other options  Patient is aware that we will most likely need to cancel her 5/4/21 appointment but will hold off until next week to see where we are at with the appeal process etc  Patient verbally understood and will give us a call back if she has an update on her call with [employer]

## 2021-04-23 NOTE — TELEPHONE ENCOUNTER
Harvey,    Can you please contact the patient and advise her that her Botox has been denied  Please let her know that we are going to try and submit an appeal for her Botox dosage       Thank you    Surinder Calhoun

## 2021-04-23 NOTE — TELEPHONE ENCOUNTER
Called patient, left message asked patient to call back, if patient calls back please forward to the botox coordinators    Will attempt patient again on Monday

## 2021-04-23 NOTE — TELEPHONE ENCOUNTER
Peer denied  It can be appealed at [phone]   I am in the hospital today and will try to call but I may not be able to get to an appeal

## 2021-04-26 NOTE — TELEPHONE ENCOUNTER
Harvey,    Can you please follow-up on this today and see what is needed for the appeal  If you are able to print out the clinical notes and previously injection notes to fax over for the appeal  Please irena as an urgent request  Let me know if you need any help with this    Thank you    Josie Rank

## 2021-04-26 NOTE — RESULT ENCOUNTER NOTE
Marie Galeano,     Your HPV testing is negative  Your pap is still pending, and will be updated soon  Please contact the office at [phone] with any questions       Theo

## 2021-04-26 NOTE — TELEPHONE ENCOUNTER
Faxed letter of medical necessity, last office and 2 procedure notes(one using 155 units, one using 200 units) to [address] @ [phone] 4/26/2021 marked as an urgent request  Will await call with further appeal information

## 2021-04-27 NOTE — TELEPHONE ENCOUNTER
Received call from [facility]  department spoke with Kimberlee Ledezma, she states she is calling regarding the appeal request we sent in  Benita states that they fax was received to the "appeals for denied claims" department, this needs to be worked by the department that appeals authorization denials, she states she will forward this request to the appropriate tea  Benita advised this process can take up to 72 hours, asked Benita to irena the request at urgent   Will await a follow up call from [facility]

## 2021-04-28 LAB
LAB AP GYN PRIMARY INTERPRETATION: NORMAL
Lab: NORMAL

## 2021-04-28 NOTE — TELEPHONE ENCOUNTER
Received a call from [employer], spoke with Thad she is calling to inform that the appeal review has been completed  Thad states that denial for Botox 200 units stands, she also noted that they are unable to appeal request for 155 units because they request was for 200 units  She states if we would like to submit for 155 units we will need to resubmit through the [employer] Portal     Attempted to complete prior authorization for 155 units received message " A Duplicate Authorization exists  Please call the Clinical Triage line at  [phone], if this request is still required "  Will call [facility] and ask for denied authorization to be cancelled so we can submit a new authorization

## 2021-04-29 ENCOUNTER — APPOINTMENT (OUTPATIENT)
Dept: URGENT CARE | Facility: CLINIC | Age: 31
End: 2021-04-29

## 2021-04-29 ENCOUNTER — APPOINTMENT (OUTPATIENT)
Dept: RADIOLOGY | Facility: CLINIC | Age: 31
End: 2021-04-29
Payer: COMMERCIAL

## 2021-04-29 DIAGNOSIS — S89.91XA INJURY OF RIGHT KNEE, INITIAL ENCOUNTER: Primary | ICD-10-CM

## 2021-04-29 DIAGNOSIS — S89.91XA INJURY OF RIGHT KNEE, INITIAL ENCOUNTER: ICD-10-CM

## 2021-04-29 PROCEDURE — 73564 X-RAY EXAM KNEE 4 OR MORE: CPT

## 2021-04-29 NOTE — TELEPHONE ENCOUNTER
Called patient left message asking her to call back regarding her 5/5 botox, if patient calls back please transfer to botox coordinators

## 2021-04-29 NOTE — TELEPHONE ENCOUNTER
[facility], spoke with Burke Newman, advised I was calling to cancel authorization # [id number]  Burke Newman states that I will need to leave a message for the nursing team to request authorization be cancelled  She transferred me to the Prior Authorization Clinical Nursing voicemail  Left detailed message requesting to cancel authorization # [id number], requested a call back once request is complete  Will await call and submit new authorization once old authorization is cancelled

## 2021-04-29 NOTE — TELEPHONE ENCOUNTER
Received voicemail from patient returning my call  Called, spoke with patient, advised patient that the appeal for the Botox 200 units was denied  Advised that we then attempted to submit Botox 155 units we received denial because the last office note indicated we would be injecting in the [place] Muscles, and this is not a covered injection site for Chronic Migraine  Advised patient our next step is to have Dr Bladimir Khan addened her last office note stating that we will be injecting in previous injection site for Chronic Migraine  Advised patient that since authorization and medication are still not obtained patient's 5/5 botox will need to be cancelled, once authorization is approved and medication is delivered to our office we will reschedule her appointment, this way we ensure everything is resolved   Patient verbalized understanding and will await our call with updates

## 2021-04-29 NOTE — TELEPHONE ENCOUNTER
Received a call from [employer] prior authorization spoke with Michael Machado, she states she is reviewing the expedited request for Botox, she states that the office note we submitted states there is a change in the planned injection sites for botox  Reviewed office note, note does states "-patient has jaw clenching - will need injections at the [place] muscles as well "  Michael Machado states that this note was commented on the previous denial too, however it was not a "denial reason" which is why we were not notified of it earlier  The [place] muscles are not a covered injection site for Dx G 43 709, in order for therapy to be approved office note would need to be addended to states that patient will receive 155 units with the previous injection sites for Chronic Migraine  Michael Machado states that since request was marked as expedited this would need to be complete by end of business day, unfortunately Dr Stacey Velazquez is out of the office until Tuesday 5/4  Michael Machado states that since we are unable to addend notes today it would be best to withdraw the request and re-submit once note is ready  Authorization # [id number] has been withdrawn  Will reach out to Dr Stacey Velazquez and ask for note to be addended, will also reach out to patient to cancel appointment until this issue is resolved

## 2021-04-29 NOTE — TELEPHONE ENCOUNTER
Prior authorization submitted to [facility] through [employer] portal, marked request as expedited  Pending authorization # [id number]  Will await approval/denial letter

## 2021-04-29 NOTE — TELEPHONE ENCOUNTER
Good Afternoon Dr Justa Fang,    Please see above- Patients Botox was denied because the requested injection sites have changed  In your last office note on 4/6/2021 you stated  "- patient has jaw clenching - will need injections at [place] muscles as well "     Per insurance the [place] Muscles are not a covered injection site for Dx Chronic Migraine  If you are agreeable, we are able to submit addened note, with added note "patient will received 155 units with previous injection sites for Chronic Migraine "    Please advise      Thanks  Fay James

## 2021-04-29 NOTE — TELEPHONE ENCOUNTER
Received a call from [employer] prior authorization spoke with Parul, she states that they received my voicemail request, she is unable to cancel the authorization as they need record of the denial, Parul states she can edit they authorization dates to start and end on 4/16/2021, then we will be able to apply for authorization as long as it is after 4/16/21   Authorization dates have been updates, will apply for auth through [employer] portal

## 2021-05-04 NOTE — TELEPHONE ENCOUNTER
Noted, thank you! Prior authorization submitted to [facility] via [employer] Portal, marked as expedited, pending authorization # [id number]   Will await approval denial letter

## 2021-05-05 NOTE — TELEPHONE ENCOUNTER
Received approval from [facility] via [employer], approval information is as follows:  Botox 620 units total  Authorization # [id number]  Valid dates 5/4/2021 until 5/4/2022

## 2021-05-06 NOTE — TELEPHONE ENCOUNTER
[facility] spoke with Christiano Casanova, advised I was calling to initiate a refill for patient's  Refill has been initiated, also advised Christiano Casanova of new prior authorization informatin, she added this note into patient's chart   Will call for status check in 2 days

## 2021-05-10 NOTE — TELEPHONE ENCOUNTER
Called [facility] specialty pharmacy spoke with Rock Kraus , advised I was calling to check the status of patient's botox order  Rock Kraus states that patient's order is still in major medical, I asked  Rock Kraus to sent upgrade the priority to a STAT priority   Will call for a status check in 2 days

## 2021-05-11 ENCOUNTER — TELEPHONE (OUTPATIENT)
Dept: PSYCHIATRY | Facility: CLINIC | Age: 31
End: 2021-05-11

## 2021-05-11 NOTE — TELEPHONE ENCOUNTER
Pt called today to cx her 5/17 appt  She asked that I let you know that she is sorry she has had to cx the last few times  She wants to continue care but things "have been crazy " She had a work injury and is going for an MRI on 5/17

## 2021-05-12 ENCOUNTER — TELEPHONE (OUTPATIENT)
Dept: PSYCHIATRY | Facility: CLINIC | Age: 31
End: 2021-05-12

## 2021-05-12 NOTE — TELEPHONE ENCOUNTER
Called [facility] specialty pharmacy spoke with Priya Clark, advised I was calling to check the status of patient's botox order  Priya Clark states that patient's profile is still in major medical, I asked Priya Clark if patient's profile is marked as STAT as requested 2 days ago  Priya Clark states that a STAT email was sent but the order itself was not marked with a STAT priority  She updated account to STAT and advised to call back in 2 days to check the status

## 2021-05-14 NOTE — TELEPHONE ENCOUNTER
Called [facility] specialty pharmacy spoke with Bal, advised I was calling to check the status of patient's botox order   Bal states that patient's order is still in major medcial, will send emai to kavita with Jevon and call for status chec in 2 days

## 2021-05-17 ENCOUNTER — TELEPHONE (OUTPATIENT)
Dept: NEUROLOGY | Facility: CLINIC | Age: 31
End: 2021-05-17

## 2021-05-17 NOTE — TELEPHONE ENCOUNTER
Called pt and states that she was notified that her botox PA has been approved  Asking if she can schedule botox appt? Advised pt that Josué Gloria is still waiting for botox order  See enc 3/19/21  Pt verbalized understanding  Pt denies migraine at this time, but had been getting inconsistent migraines       Renae, pls f/u re: botox order and call pt w/ update      Thanks                       Ok to leave detailed message

## 2021-05-17 NOTE — TELEPHONE ENCOUNTER
Noted, called [facility] was able to schedule delivery for tomorrow 5/17/2021, will wait until medication is at our location before asking MA to contact patient to schedule, to ensure medication is here before rescheduling

## 2021-05-17 NOTE — TELEPHONE ENCOUNTER
Called [facility] specialty pharmacy spoke with Abby Rivas, advised I was calling to check the status of patient's botox order  Abby Rivas states that order is ready to schedule for delivery and confirmed patient's consent is on file  Botox is scheduled for delivery Tuesday 5/18/2021 to the [place] location via [facility]    Please await patient's botox delivery and document once received, please advise if medication is not received by 2pm     Thanks  Oswald Yee

## 2021-05-17 NOTE — TELEPHONE ENCOUNTER
----- Message from Bridget Crane RN sent at 5/14/2021  7:23 AM EDT -----  Regarding: FW: Visit Follow-Up Question  Contact: [phone]    ----- Message -----  From: Rachel Falcon  Sent: 5/13/2021   9:28 PM EDT  To: Neurology [facility] Team 1  Subject: Visit Follow-Up Question                         Hello, I received a letter that my units were approved  Can we make an appointment? ? I am starting to have pulse migraines lasting about 5-10 min

## 2021-05-18 NOTE — TELEPHONE ENCOUNTER
Good Morning Heydi! Please reach out to patient to reschedule previously missed BINJ from 5/5/21  Patient's appointment was delayed due to authorization issues, all has been resolved and medication is now in the fridge at the [place] location  Patient can be brought in as soon as possible, please advised once patient has been scheduled so referral can be attached       Thanks  Bacilio Aldridge RA (rheumatoid arthritis)

## 2021-05-18 NOTE — TELEPHONE ENCOUNTER
Botox number of units: 200 units  Botox quantity: Two 100 Unit vials  Arrived at what location: [facility]  Lot number: [id number]  Expiration Date: 10/2023  Appt notes indicate correct medication: Yes

## 2021-05-19 ENCOUNTER — NURSE TRIAGE (OUTPATIENT)
Dept: OTHER | Facility: OTHER | Age: 31
End: 2021-05-19

## 2021-05-19 ENCOUNTER — PROCEDURE VISIT (OUTPATIENT)
Dept: NEUROLOGY | Facility: CLINIC | Age: 31
End: 2021-05-19
Payer: COMMERCIAL

## 2021-05-19 ENCOUNTER — APPOINTMENT (OUTPATIENT)
Dept: URGENT CARE | Facility: CLINIC | Age: 31
End: 2021-05-19
Payer: OTHER MISCELLANEOUS

## 2021-05-19 VITALS — DIASTOLIC BLOOD PRESSURE: 82 MMHG | SYSTOLIC BLOOD PRESSURE: 114 MMHG | TEMPERATURE: 98.2 F

## 2021-05-19 DIAGNOSIS — G43.709 CHRONIC MIGRAINE WITHOUT AURA WITHOUT STATUS MIGRAINOSUS, NOT INTRACTABLE: Primary | ICD-10-CM

## 2021-05-19 PROCEDURE — 99213 OFFICE O/P EST LOW 20 MIN: CPT | Performed by: NURSE PRACTITIONER

## 2021-05-19 PROCEDURE — 64615 CHEMODENERV MUSC MIGRAINE: CPT | Performed by: PSYCHIATRY & NEUROLOGY

## 2021-05-19 NOTE — PROGRESS NOTES
Chemodenervation     Date/Time 5/19/2021 2:13 PM     Performed by  Emerson Hilton MD     Authorized by Emerson Hilton MD        Pre-procedure details      Prepped With: Alcohol     Anesthesia  (see MAR for exact dosages): Anesthesia method:  None   Botox     Botox Type:  Type A    Brand:  Botox    mL's of Botulinum Toxin:  165    Medication Administration:  100 Units onabotulinumtoxin A 100 units   Procedures     Botox Procedures: chronic headache      Indications: migraines     Injection Location      Head / Face:  L frontalis, R frontalis, R , L , L inferior cervical paraspinal, R inferior cervical paraspinal, L superior cervical paraspinal, R superior cervical paraspinal, procerus, L inferior occipitalis, R inferior occipitalis, L inferior trapezius, R inferior trapezius, L temporalis and R temporalis    L  injection amount:  5 unit(s)    R  injection amount:  5 unit(s)    L lateral frontalis:  5 unit(s)    R lateral frontalis:  5 unit(s)    L medial frontalis:  5 unit(s)    R medial frontalis:  5 unit(s)    L temporalis injection amount:  20 unit(s)    R temporalis injection amount:  20 unit(s)    Procerus injection amount:  5 unit(s)    L inferior occipitalis injection amount:  15 unit(s)    R inferior occipitalis injection amount:  15 unit(s)    L inferior cervical paraspinal injection amount:  5 unit(s)    R inferior cervical paraspinal injection amount:  5 unit(s)    L superior cervical paraspinal injection amount:  5 unit(s)    R superior cervical paraspinal injection amount:  5 unit(s)    L inferior trapezius injection amount:  15 unit(s)    R inferior trapezius injection amount:  15 unit(s)   Total Units     Total units used:  165    Total units discarded:  35   Post-procedure details      Chemodenervation:  Chronic migraine    Facial Nerve Location[de-identified]  Bilateral facial nerve    Patient tolerance of procedure:   Tolerated well, no immediate complications Comments      5 units of masseter given on each side, 10 units total

## 2021-05-19 NOTE — TELEPHONE ENCOUNTER
Noted, will attach referral   Gulshan Moss medication will need to be couriered from [facility] to [facility], please document accordingly in this encounter      Thanks  Stephen Rodrigez

## 2021-05-19 NOTE — TELEPHONE ENCOUNTER
*UPDATED AUTHORIZATION INFORMATION*      Type Date User Summary Attachment   General 05/19/2021  8:14 AM Katelyn Villalobos [facility] coordination  -   Note    Botox - authorization # [id number] - 866 Units - valid dates 5/4/2021 - 5/4/2022   Please use [facility]      Thanks  Janis Ceron

## 2021-05-19 NOTE — TELEPHONE ENCOUNTER
Samaria Lechuga please have patient botox ready for pick-up sending to [facility] third floor  Thanks!

## 2021-05-19 NOTE — TELEPHONE ENCOUNTER
Regarding: Procedure Side Effect  ----- Message from Brianna Giron sent at 5/19/2021  7:40 PM EDT -----  " Today I received Botox for my migraines and I believe I am having side effects; migraines, nausea, and tenderness on the injection site "

## 2021-05-20 ENCOUNTER — TELEPHONE (OUTPATIENT)
Dept: NEUROLOGY | Facility: CLINIC | Age: 31
End: 2021-05-20

## 2021-05-20 DIAGNOSIS — G43.709 CHRONIC MIGRAINE WITHOUT AURA WITHOUT STATUS MIGRAINOSUS, NOT INTRACTABLE: Primary | ICD-10-CM

## 2021-05-20 NOTE — LETTER
May 20, 2021     Patient: Anupam Spring   YOB: 1990   Date of Visit: 5/19/2021       To Whom it May Concern:    Florestine Formica is under my professional care  She was seen in my office on 5/19/2021 and received Botox injections for treatment of chronic migraine  Please excuse her from work 5/20/2021 and 5/21/2021  If you have any questions or concerns, please don't hesitate to call           Sincerely,          Esau Chao MD

## 2021-05-20 NOTE — TELEPHONE ENCOUNTER
I TC Dr Martha Gonzales with this patient's information that she got Botox injections in [facility] office today and 2 hours ago started with side effect symptoms she thinks? a migraine, nausea, vomiting tenderness at some injection sites, eyes very sensitive to light  Took two Excredin Migraine with some relief  She has been taking them for 2 years now and never had this reaction afterwards but there has been a delay since her last time due to insurance  Please advise

## 2021-05-20 NOTE — TELEPHONE ENCOUNTER
Reason for Disposition   Nursing judgment or information in reference    Answer Assessment - Initial Assessment Questions  1  REASON FOR CALL: "What is your main concern right now?"      That she is having side effect symptoms after her Botox injections for her Migraines  2  ONSET: "When did the symptoms start?"      2 hours ago  3  SEVERITY: "How bad is the migraine?"      Feels like her migraines - pain on top right side of her head  Took two Excedrin Migraine with some relief  4  FEVER: "Do you have a fever?"      No fever  5  OTHER SYMPTOMS: "Do you have any other new symptoms?"      Nausea,vomiting times 2, light sensitivity with her eyes, tenderness at injection sites  6  INTERVENTIONS AND RESPONSE: "What have you done so far to try to make this better? What medications have you used?"      Excedrin Migraine -2 tabs with some relief  7   PREGNANCY: "Is there any chance you are pregnant?"      No    Protocols used: NO GUIDELINE AVAILABLE-ADULT-

## 2021-05-20 NOTE — TELEPHONE ENCOUNTER
TT Conversation with Dr Trula Kussmaul so far:     Helcarlos!! Jacinto Whyte [birth year] said she had a nasty migraine after her botox and would like a letter to stay out of work today and tomorrow  Are you agreeable to us writing this for you? And she also asked about an abortive med for migraines  11:20 AM  20 days left  Read  Yea!  11:20 AM  20 days left    Also asked her recommendation for abortive migraine med

## 2021-05-20 NOTE — TELEPHONE ENCOUNTER
sheesh  Also, is there an abortive med you'd recommend for her?  11:45 AM  20 days left  Read  Yeradha  11:56 AM  20 days left  Imitrex 100mg PRn as needed for headache  11:56 AM  20 days left  Can you call it in  11:56 AM  20 days left  Thanks! 11:56 AM  20 days left  No problem! 9 tabs for 30 days, right? 11:56 AM  20 days left  Read  Yep  11:59 AM  20 days left  One refill  11:59 AM  20 days left    Called [facility], spoke with Aminata Schreiber, called in med as requested

## 2021-05-20 NOTE — TELEPHONE ENCOUNTER
----- Message from Isabel Méndez RN sent at 5/20/2021  7:52 AM EDT -----  Regarding: FW: Visit Follow-Up Question  Contact: [phone]    ----- Message -----  From: Olympia Kussmaul  Sent: 5/19/2021  10:21 PM EDT  To: Neurology [facility] Team 1  Subject: Visit Radha Patterson Dr Azeem Brooks,    About 2 hours after I left I started feeling a sharp pain in the back of my head  It was in and out and then suddenly was very intense  I experienced a chronic migraine episode     I was nauseous and vomited around 7, light and sound sensitivity, snd pulsing pains on the right side  I called the answering line and they told me to go to the ER  I didn't go, I fell asleep and just woke up  Still have a dull headache  I need to take off tomorrow snd my work is requesting written excuse  To be honest, if I still feel this way tomorrow I will be taking Friday off as well  If you can please provide a written excuse for Thursday snd Friday  explaining my procedure side effects I would greatly appreciate that  I will call the office tomorrow as well   I wanted to give a heads up and not setting an alarm so I can fully rest      Thank you so much

## 2021-05-20 NOTE — TELEPHONE ENCOUNTER
Dr Gregorio Odessa responded back for this patient to go to the ER  I called patient back and she will get a ride and go to [facility] ER tonight

## 2021-05-20 NOTE — TELEPHONE ENCOUNTER
Patient called in asking about this encounter  States she did not want to go to the ED yesterday because this was typical migraine episode  Had a sharp pulsing pain on the right side of her head after injection  States the injection site was tender to the touch and pulsing  Pain was an 8/10  Nausea and vomiting yesterday, light and sound sensitivity  She took Excedrin migraine at 7 pm, then fell sleep around 9 pm  Woke up in pain still  Currently she feels light and sound sensitivity, dull headache continued, pain is 5/10 today  States she used to get an abortive medication but does not remember what this was  Would like an abortive med  [facility] [address] [facility] [place]  Would like the letter to say out of work today and tomorrow  Letter to go through [address]

## 2021-05-25 RX ORDER — SUMATRIPTAN 100 MG/1
100 TABLET, FILM COATED ORAL ONCE AS NEEDED
Qty: 9 TABLET | Refills: 1
Start: 2021-05-25 | End: 2022-02-07

## 2021-06-02 ENCOUNTER — RA CDI HCC (OUTPATIENT)
Dept: OTHER | Facility: HOSPITAL | Age: 31
End: 2021-06-02

## 2021-06-03 NOTE — PROGRESS NOTES
[place] [id number]  coding opportunities             Chart reviewed, (number of) suggestions sent to provider: 3     Problem listed updated  Provider Accepted, (number of) suggestions accepted: 3         Number of suggestions actually used: 2      Number of suggestions NOT actually used: 1     Patients insurance company: [employer] (Medic[employer] Advantage and Commercial)   dx on bill: F33 1, M06 09  Visit status: Patient arrived for their scheduled appointment        [place] [address]  coding opportunities             Chart reviewed, (number of) suggestions sent to provider: 3      DX:  F33 1-Major depressive disorder, recurrent, moderate-on cymbalta  M06 09-Rheumatoid arthritis without rheumatoid factor, multiple sites-unsure on current meds  M45  9-Ankylosing spondylitis of unspecified sites in spine-unsure on current meds         Patients insurance company: [facility] ([employer])

## 2021-06-07 PROBLEM — M45.9 ANKYLOSING SPONDYLITIS OF UNSPECIFIED SITES IN SPINE (HCC): Status: ACTIVE | Noted: 2021-06-07

## 2021-06-08 ENCOUNTER — OFFICE VISIT (OUTPATIENT)
Dept: INTERNAL MEDICINE CLINIC | Facility: CLINIC | Age: 31
End: 2021-06-08
Payer: COMMERCIAL

## 2021-06-08 VITALS
TEMPERATURE: 97.4 F | HEART RATE: 82 BPM | HEIGHT: 64 IN | OXYGEN SATURATION: 98 % | DIASTOLIC BLOOD PRESSURE: 80 MMHG | SYSTOLIC BLOOD PRESSURE: 110 MMHG | WEIGHT: 176 LBS | BODY MASS INDEX: 30.05 KG/M2

## 2021-06-08 DIAGNOSIS — Z11.1 SCREENING FOR TUBERCULOSIS: ICD-10-CM

## 2021-06-08 DIAGNOSIS — Z00.00 ANNUAL PHYSICAL EXAM: Primary | ICD-10-CM

## 2021-06-08 DIAGNOSIS — M79.7 FIBROMYALGIA: ICD-10-CM

## 2021-06-08 DIAGNOSIS — F33.1 MAJOR DEPRESSIVE DISORDER, RECURRENT, MODERATE (HCC): ICD-10-CM

## 2021-06-08 DIAGNOSIS — E78.2 MIXED HYPERLIPIDEMIA: ICD-10-CM

## 2021-06-08 DIAGNOSIS — G43.709 CHRONIC MIGRAINE WITHOUT AURA WITHOUT STATUS MIGRAINOSUS, NOT INTRACTABLE: ICD-10-CM

## 2021-06-08 DIAGNOSIS — M06.09 RHEUMATOID ARTHRITIS OF MULTIPLE SITES WITH NEGATIVE RHEUMATOID FACTOR (HCC): ICD-10-CM

## 2021-06-08 PROCEDURE — 99395 PREV VISIT EST AGE 18-39: CPT | Performed by: NURSE PRACTITIONER

## 2021-06-08 PROCEDURE — 3008F BODY MASS INDEX DOCD: CPT | Performed by: NURSE PRACTITIONER

## 2021-06-08 PROCEDURE — 1036F TOBACCO NON-USER: CPT | Performed by: NURSE PRACTITIONER

## 2021-06-08 PROCEDURE — 86580 TB INTRADERMAL TEST: CPT

## 2021-06-08 RX ORDER — PREGABALIN 150 MG/1
150 CAPSULE ORAL 2 TIMES DAILY
Qty: 180 CAPSULE | Refills: 0 | Status: SHIPPED | OUTPATIENT
Start: 2021-06-08 | End: 2021-09-14 | Stop reason: SDUPTHER
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
## 2021-06-08 NOTE — ASSESSMENT & PLAN NOTE
On humira and lyrica  Also on cymbalta  Discussed possibly weaning from lyrica  She would like to hold off due to some new knee/hip pain  Sees rheumatology

## 2021-06-10 ENCOUNTER — CLINICAL SUPPORT (OUTPATIENT)
Dept: INTERNAL MEDICINE CLINIC | Facility: CLINIC | Age: 31
End: 2021-06-10

## 2021-06-10 ENCOUNTER — TELEPHONE (OUTPATIENT)
Dept: PSYCHIATRY | Facility: CLINIC | Age: 31
End: 2021-06-10

## 2021-06-10 DIAGNOSIS — Z11.1 ENCOUNTER FOR PPD SKIN TEST READING: Primary | ICD-10-CM

## 2021-06-10 LAB
INDURATION: 0 MM
TB SKIN TEST: NEGATIVE

## 2021-06-10 NOTE — TELEPHONE ENCOUNTER
Left message for patient to contact intake to schedule an appointment  Dr Sharon Bennett asked to have patient scheduled with a resident

## 2021-07-19 ENCOUNTER — SOCIAL WORK (OUTPATIENT)
Dept: BEHAVIORAL/MENTAL HEALTH CLINIC | Facility: CLINIC | Age: 31
End: 2021-07-19
Payer: COMMERCIAL

## 2021-07-19 DIAGNOSIS — F41.1 GAD (GENERALIZED ANXIETY DISORDER): ICD-10-CM

## 2021-07-19 DIAGNOSIS — F33.1 MAJOR DEPRESSIVE DISORDER, RECURRENT, MODERATE (HCC): Primary | ICD-10-CM

## 2021-07-19 PROBLEM — Z72.89 SELF-INJURIOUS BEHAVIOR: Status: RESOLVED | Noted: 2018-02-05 | Resolved: 2021-07-19

## 2021-07-19 PROCEDURE — 90834 PSYTX W PT 45 MINUTES: CPT | Performed by: SOCIAL WORKER

## 2021-07-19 NOTE — PSYCH
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  Also engaged to [place]  Graduated Master's program  Passed Principal Certification  Feels she is 'very stable'  Aware she is moving to a resident for meds  Resolved goals of SIB and EDNOS  Denied either behavior for 1 year  Daily mediation with [place] - very connected  Processed how Rajat Barriga comes into the mix- Terrell Patino feels she is in a much better place with this and  Denied SI    A: Terrell Patino has demonstrated so much progress since last session  Many changes moving forward - support will continue  P: Continue individual therapy    Behavioral Health Treatment Plan [facility]: Diagnosis and Treatment Plan explained to Zenon Mcpherson relates understanding diagnosis and is agreeable to Treatment Plan   Yes

## 2021-07-30 ENCOUNTER — TELEPHONE (OUTPATIENT)
Dept: NEUROLOGY | Facility: CLINIC | Age: 31
End: 2021-07-30

## 2021-07-30 NOTE — TELEPHONE ENCOUNTER
Type Date User Summary Attachment   General 07/26/2021  3:10 PM [address] care coordination -   Note    Botox 155 units - authorization # [id number] - valid 4 visits(620 total units) - 2nd visit(420 units remaining) - valid dates 5/4/2021 - 5/4/2022   Please use [facility]      Thanks  De Ewing

## 2021-08-02 NOTE — TELEPHONE ENCOUNTER
[address]  spoke with Jay, advised I was calling to initiate a refill for patient's botox medication  Refill has been initiated and patient's profile sent to insurance verification   Will call for a status check in 2 days

## 2021-08-04 NOTE — TELEPHONE ENCOUNTER
[address]  spoke with Eugene Hanna, advised I was calling to check the status of patient's botox order  Aviva Stanford and states that when I called in 2 days ago a new order was not started  Eugene Hanna created a new order and patient's profile will now be sent to insurance verifcation   Will call for a status check in 2 days

## 2021-08-06 NOTE — TELEPHONE ENCOUNTER
[address]  spoke with Jaya Pelaez, advised I was calling to check the status of patient's botox order  Jaya Pelaez states that order is still in insurance verification   Will call for a status check in 2 days

## 2021-08-10 NOTE — TELEPHONE ENCOUNTER
Called [facility], [facility]  [facility], advised I was calling to check the status of patient's botox order  [facility] is [facility] in [facility], it has not yet move to major medical as of yet   [facility] transferred me to insurance verification to further assist me  Spoke with Zenon Sharp, advised I was calling to check the status of patient's botox order however notes are showing profile is still stuck in insurance verification and has yet to be moved to major medical, she is going to send a STAT email to [facility] and ask them to expedite this order   Will call for a status check in 2 days

## 2021-08-12 NOTE — TELEPHONE ENCOUNTER
[address]  spoke with Arabella, advised I was calling to check the status of patient's botox order  Arabella advised that order is ready to schedule for delivery, patient's consent for shipment is on file however consent is only for a $0 co-pay and for this fill her co-pay is $75  Attempted to call patient with Margaux on the line but was unsuccessful, left message asking patient to call back  If patient calls back please advise that her botox medication is ready to schedule for delivery from [address]  Patient needs to call Margaux @ [phone] to provide consent for co-pay  Will attempt patient again tomorrow

## 2021-08-13 DIAGNOSIS — F41.1 GAD (GENERALIZED ANXIETY DISORDER): ICD-10-CM

## 2021-08-13 DIAGNOSIS — F33.1 MODERATE EPISODE OF RECURRENT MAJOR DEPRESSIVE DISORDER (HCC): ICD-10-CM

## 2021-08-13 NOTE — TELEPHONE ENCOUNTER
[address]  spoke with Alcides Mcgarry, advised I was calling to check if patient has called as of yet to provide her consent for co-pay  Alcides Mcgarry states that consent has not yet been obtained, he will send an email to the outbound team and ask them to reach out to patient to obtain consent  Will also attempt patient on our end

## 2021-08-13 NOTE — TELEPHONE ENCOUNTER
Received a voicemail from [employer] they are calling to schedule delivery of patient's botox medication  [address]  spoke with Maura Burgess, advised I was calling to schedule delivery of patient's botox medication  Pernell confirmed order is ready and consent for payment has been obtained  Botox is scheduled for delivery Tuesday 8/17/2021 to the [place] location via [employer]    Please await patient's botox delivery and document once received, please advise if medication is not received by 2pm     Thanks  Courtney Faith

## 2021-08-17 NOTE — TELEPHONE ENCOUNTER
Botox number of units: 100  Botox quantity: 2  Arrived at what location: [place]  Botox at Correct Administering Location: yes  NDC number: [id number]  Lot number: [id number]  Expiration Date: 11/30/23  Appt notes indicate correct medication: yes

## 2021-08-23 RX ORDER — DULOXETIN HYDROCHLORIDE 30 MG/1
CAPSULE, DELAYED RELEASE ORAL
Qty: 90 CAPSULE | Refills: 0 | Status: SHIPPED | OUTPATIENT
Start: 2021-08-23 | End: 2021-09-23 | Stop reason: SDUPTHER

## 2021-09-02 ENCOUNTER — TELEPHONE (OUTPATIENT)
Dept: NEUROLOGY | Facility: CLINIC | Age: 31
End: 2021-09-02

## 2021-09-02 NOTE — TELEPHONE ENCOUNTER
Patient cancelled Botox appointment, she called today she wants to try to reschedule  I sent over message to MA because providers schedule is far out   The patient also said she can't make it until after 4 pm

## 2021-09-13 ENCOUNTER — PATIENT MESSAGE (OUTPATIENT)
Dept: INTERNAL MEDICINE CLINIC | Facility: CLINIC | Age: 31
End: 2021-09-13

## 2021-09-13 DIAGNOSIS — M79.7 FIBROMYALGIA: ICD-10-CM

## 2021-09-14 RX ORDER — PREGABALIN 150 MG/1
150 CAPSULE ORAL 2 TIMES DAILY
Qty: 180 CAPSULE | Refills: 0 | Status: SHIPPED | OUTPATIENT
Start: 2021-09-14 | End: 2021-12-01 | Stop reason: SDUPTHER

## 2021-09-15 ENCOUNTER — TELEPHONE (OUTPATIENT)
Dept: INTERNAL MEDICINE CLINIC | Facility: CLINIC | Age: 31
End: 2021-09-15

## 2021-09-15 ENCOUNTER — TELEPHONE (OUTPATIENT)
Dept: OTHER | Facility: OTHER | Age: 31
End: 2021-09-15

## 2021-09-15 NOTE — TELEPHONE ENCOUNTER
Patient called and wanted to reschedule her Botox appointment with Dr Javon Brand  Patient has a botox authorization as of 5/04/2021 to 5/04/2022  Patient also stated that she has new insurance so at this point can someone call patient back to get new insurance information due to patient stated she will call me back later to see if I can find her a botox appointment around November or so  If patient call me back I will get much of the information as needed

## 2021-09-23 DIAGNOSIS — F33.1 MODERATE EPISODE OF RECURRENT MAJOR DEPRESSIVE DISORDER (HCC): ICD-10-CM

## 2021-09-23 DIAGNOSIS — Z72.89 SELF-INJURIOUS BEHAVIOR: ICD-10-CM

## 2021-09-23 DIAGNOSIS — F41.1 GAD (GENERALIZED ANXIETY DISORDER): ICD-10-CM

## 2021-09-27 RX ORDER — DULOXETIN HYDROCHLORIDE 60 MG/1
60 CAPSULE, DELAYED RELEASE ORAL DAILY
Qty: 90 CAPSULE | Refills: 0 | Status: SHIPPED | OUTPATIENT
Start: 2021-09-27 | End: 2021-10-19

## 2021-09-27 RX ORDER — DULOXETIN HYDROCHLORIDE 30 MG/1
30 CAPSULE, DELAYED RELEASE ORAL
Qty: 90 CAPSULE | Refills: 0 | Status: SHIPPED | OUTPATIENT
Start: 2021-09-27 | End: 2021-10-19

## 2021-09-27 RX ORDER — LAMOTRIGINE 100 MG/1
100 TABLET ORAL DAILY
Qty: 90 TABLET | Refills: 0 | Status: SHIPPED | OUTPATIENT
Start: 2021-09-27 | End: 2021-10-19

## 2021-10-05 ENCOUNTER — TELEPHONE (OUTPATIENT)
Dept: OBGYN CLINIC | Facility: HOSPITAL | Age: 31
End: 2021-10-05

## 2021-10-05 ENCOUNTER — TELEPHONE (OUTPATIENT)
Dept: OTHER | Facility: OTHER | Age: 31
End: 2021-10-05

## 2021-10-06 ENCOUNTER — TELEPHONE (OUTPATIENT)
Dept: PSYCHIATRY | Facility: CLINIC | Age: 31
End: 2021-10-06

## 2021-10-15 ENCOUNTER — TELEPHONE (OUTPATIENT)
Dept: NEUROLOGY | Facility: CLINIC | Age: 31
End: 2021-10-15

## 2021-10-19 DIAGNOSIS — F41.1 GAD (GENERALIZED ANXIETY DISORDER): ICD-10-CM

## 2021-10-19 DIAGNOSIS — F33.1 MODERATE EPISODE OF RECURRENT MAJOR DEPRESSIVE DISORDER (HCC): ICD-10-CM

## 2021-10-19 DIAGNOSIS — Z72.89 SELF-INJURIOUS BEHAVIOR: ICD-10-CM

## 2021-10-19 RX ORDER — DULOXETIN HYDROCHLORIDE 60 MG/1
60 CAPSULE, DELAYED RELEASE ORAL DAILY
Qty: 30 CAPSULE | Refills: 0 | Status: SHIPPED | OUTPATIENT
Start: 2021-10-19 | End: 2021-11-18

## 2021-10-19 RX ORDER — DULOXETIN HYDROCHLORIDE 30 MG/1
30 CAPSULE, DELAYED RELEASE ORAL
Qty: 30 CAPSULE | Refills: 0 | Status: SHIPPED | OUTPATIENT
Start: 2021-10-19 | End: 2021-11-18

## 2021-10-19 RX ORDER — LAMOTRIGINE 100 MG/1
100 TABLET ORAL DAILY
Qty: 30 TABLET | Refills: 0 | Status: SHIPPED | OUTPATIENT
Start: 2021-10-19 | End: 2021-11-18

## 2021-10-21 ENCOUNTER — TELEPHONE (OUTPATIENT)
Dept: NEUROLOGY | Facility: CLINIC | Age: 31
End: 2021-10-21

## 2021-10-22 ENCOUNTER — TELEPHONE (OUTPATIENT)
Dept: NEUROLOGY | Facility: CLINIC | Age: 31
End: 2021-10-22

## 2021-10-27 ENCOUNTER — TELEPHONE (OUTPATIENT)
Dept: NEUROLOGY | Facility: CLINIC | Age: 31
End: 2021-10-27

## 2021-10-28 ENCOUNTER — PROCEDURE VISIT (OUTPATIENT)
Dept: NEUROLOGY | Facility: CLINIC | Age: 31
End: 2021-10-28
Payer: COMMERCIAL

## 2021-10-28 VITALS — TEMPERATURE: 97.3 F | SYSTOLIC BLOOD PRESSURE: 113 MMHG | HEART RATE: 69 BPM | DIASTOLIC BLOOD PRESSURE: 77 MMHG

## 2021-10-28 DIAGNOSIS — G43.709 CHRONIC MIGRAINE WITHOUT AURA WITHOUT STATUS MIGRAINOSUS, NOT INTRACTABLE: Primary | ICD-10-CM

## 2021-10-28 PROCEDURE — 64615 CHEMODENERV MUSC MIGRAINE: CPT | Performed by: PHYSICIAN ASSISTANT

## 2021-11-01 ENCOUNTER — SOCIAL WORK (OUTPATIENT)
Dept: BEHAVIORAL/MENTAL HEALTH CLINIC | Facility: CLINIC | Age: 31
End: 2021-11-01
Payer: COMMERCIAL

## 2021-11-01 DIAGNOSIS — F41.1 GAD (GENERALIZED ANXIETY DISORDER): ICD-10-CM

## 2021-11-01 DIAGNOSIS — F33.1 MAJOR DEPRESSIVE DISORDER, RECURRENT, MODERATE (HCC): Primary | ICD-10-CM

## 2021-11-01 PROCEDURE — 90834 PSYTX W PT 45 MINUTES: CPT | Performed by: SOCIAL WORKER

## 2021-12-01 ENCOUNTER — PATIENT MESSAGE (OUTPATIENT)
Dept: INTERNAL MEDICINE CLINIC | Facility: CLINIC | Age: 31
End: 2021-12-01

## 2021-12-01 DIAGNOSIS — M79.7 FIBROMYALGIA: ICD-10-CM

## 2021-12-01 RX ORDER — PREGABALIN 150 MG/1
150 CAPSULE ORAL 2 TIMES DAILY
Qty: 180 CAPSULE | Refills: 0 | Status: SHIPPED | OUTPATIENT
Start: 2021-12-01 | End: 2021-12-17 | Stop reason: SDUPTHER

## 2021-12-07 ENCOUNTER — OFFICE VISIT (OUTPATIENT)
Dept: INTERNAL MEDICINE CLINIC | Facility: CLINIC | Age: 31
End: 2021-12-07
Payer: COMMERCIAL

## 2021-12-07 VITALS
DIASTOLIC BLOOD PRESSURE: 78 MMHG | SYSTOLIC BLOOD PRESSURE: 114 MMHG | BODY MASS INDEX: 30.56 KG/M2 | WEIGHT: 179 LBS | OXYGEN SATURATION: 99 % | HEIGHT: 64 IN | HEART RATE: 87 BPM | TEMPERATURE: 96.6 F

## 2021-12-07 DIAGNOSIS — E78.2 MIXED HYPERLIPIDEMIA: ICD-10-CM

## 2021-12-07 DIAGNOSIS — M45.9 ANKYLOSING SPONDYLITIS OF UNSPECIFIED SITES IN SPINE (HCC): ICD-10-CM

## 2021-12-07 DIAGNOSIS — F33.1 MAJOR DEPRESSIVE DISORDER, RECURRENT, MODERATE (HCC): ICD-10-CM

## 2021-12-07 DIAGNOSIS — F41.1 GAD (GENERALIZED ANXIETY DISORDER): ICD-10-CM

## 2021-12-07 DIAGNOSIS — M79.7 FIBROMYALGIA: Primary | ICD-10-CM

## 2021-12-07 DIAGNOSIS — M06.09 RHEUMATOID ARTHRITIS OF MULTIPLE SITES WITH NEGATIVE RHEUMATOID FACTOR (HCC): ICD-10-CM

## 2021-12-07 DIAGNOSIS — K62.3 RECTAL PROLAPSE: ICD-10-CM

## 2021-12-07 DIAGNOSIS — G43.709 CHRONIC MIGRAINE WITHOUT AURA WITHOUT STATUS MIGRAINOSUS, NOT INTRACTABLE: ICD-10-CM

## 2021-12-07 PROCEDURE — 99214 OFFICE O/P EST MOD 30 MIN: CPT | Performed by: NURSE PRACTITIONER

## 2021-12-07 PROCEDURE — 3008F BODY MASS INDEX DOCD: CPT | Performed by: NURSE PRACTITIONER

## 2021-12-07 PROCEDURE — 1036F TOBACCO NON-USER: CPT | Performed by: NURSE PRACTITIONER

## 2021-12-07 RX ORDER — PREGABALIN 150 MG/1
150 CAPSULE ORAL 2 TIMES DAILY
Qty: 180 CAPSULE | Refills: 0 | Status: CANCELLED | OUTPATIENT
Start: 2021-12-07

## 2021-12-17 DIAGNOSIS — M79.7 FIBROMYALGIA: ICD-10-CM

## 2021-12-23 RX ORDER — PREGABALIN 150 MG/1
150 CAPSULE ORAL 2 TIMES DAILY
Qty: 180 CAPSULE | Refills: 0 | Status: SHIPPED | OUTPATIENT
Start: 2021-12-23 | End: 2022-04-12

## 2022-01-03 ENCOUNTER — SOCIAL WORK (OUTPATIENT)
Dept: BEHAVIORAL/MENTAL HEALTH CLINIC | Facility: CLINIC | Age: 32
End: 2022-01-03
Payer: COMMERCIAL

## 2022-01-03 DIAGNOSIS — F41.1 GAD (GENERALIZED ANXIETY DISORDER): ICD-10-CM

## 2022-01-03 DIAGNOSIS — F33.1 MAJOR DEPRESSIVE DISORDER, RECURRENT, MODERATE (HCC): Primary | ICD-10-CM

## 2022-01-03 PROCEDURE — 90834 PSYTX W PT 45 MINUTES: CPT | Performed by: SOCIAL WORKER

## 2022-01-03 NOTE — PSYCH
Psychotherapy Provided: Individual Psychotherapy 50 minutes     Length of time in session: 50 minutes, follow up in 1 month    Encounter Diagnosis     ICD-10-CM    1  Major depressive disorder, recurrent, moderate (HCC)  F33 1    2  SHIRLEY (generalized anxiety disorder)  F41 1        Goals addressed in session: Goal 1, Goal 2 and Goal 3      Pain:      none    0    Current suicide risk : Low     D; Met with Catalina individually  'Things are really great'  Specific hiccups explored between Yusef and Thai Childers - intimacy; 'always apologizing and checking if I was good enough '  Obstacles with Daron's x-wife - Yusef has a good relationship but can 'push things' at times  Daron's son visited for Holiday's  Looking for [employer]  Yusef mentioned dad's 10yr Anniversary was recently - discussed looking back of the person Yusef was then and now  Denied biting, ED behaviors  Denied SI    A: Yusef presented with appropriate mood and affect  Yusef and Thai Childers continue to grow as a couple - talking open and honestly which Yusef feels has been very beneficial     P: Continue individual therapy      [address]: Diagnosis and Treatment Plan explained to Shella Apley relates understanding diagnosis and is agreeable to Treatment Plan   Yes

## 2022-01-17 ENCOUNTER — TELEPHONE (OUTPATIENT)
Dept: NEUROLOGY | Facility: CLINIC | Age: 32
End: 2022-01-17

## 2022-01-18 ENCOUNTER — TELEPHONE (OUTPATIENT)
Dept: NEUROLOGY | Facility: CLINIC | Age: 32
End: 2022-01-18

## 2022-01-18 DIAGNOSIS — F41.1 GAD (GENERALIZED ANXIETY DISORDER): ICD-10-CM

## 2022-01-18 DIAGNOSIS — F33.1 MODERATE EPISODE OF RECURRENT MAJOR DEPRESSIVE DISORDER (HCC): ICD-10-CM

## 2022-01-18 RX ORDER — DULOXETIN HYDROCHLORIDE 60 MG/1
60 CAPSULE, DELAYED RELEASE ORAL DAILY
Qty: 30 CAPSULE | Refills: 2 | Status: CANCELLED | OUTPATIENT
Start: 2022-01-18

## 2022-01-18 RX ORDER — DULOXETIN HYDROCHLORIDE 30 MG/1
30 CAPSULE, DELAYED RELEASE ORAL
Qty: 30 CAPSULE | Refills: 2 | Status: CANCELLED | OUTPATIENT
Start: 2022-01-18

## 2022-01-18 RX ORDER — BUSPIRONE HYDROCHLORIDE 15 MG/1
15 TABLET ORAL 2 TIMES DAILY
Qty: 180 TABLET | Refills: 1 | Status: CANCELLED | OUTPATIENT
Start: 2022-01-18

## 2022-01-18 NOTE — TELEPHONE ENCOUNTER
Pt called to r/s her Botox  Moved her from:    Thursday 2/17  [address] [facility] - due to work conflict    To:     Thursday 2/24  8a  [address]  [facility]  - pt familiar w [address] and wanted early in the day in CV    Sending msg to MA to confirm if the botox timetables are good

## 2022-01-19 NOTE — TELEPHONE ENCOUNTER
Jeff Yancey please reach out to [employer] to inform her of this addon  I am not sure if Marta Oliver are doing her botox 30 or 45 mins  I know that Dr Anita Santos performs her botox for 15mins

## 2022-01-20 NOTE — TELEPHONE ENCOUNTER
Call [facility] spoke with Clementtello Aashish told her I was call to schedule Ms  Catalina Botox delivery, because of the new month patient insurance have to go through benefits verification  Please allow 2-3 business working days

## 2022-01-25 NOTE — TELEPHONE ENCOUNTER
Call [address] and speak with Akbar Garcia  I told him I was calling to schedule the patient Botox delivery; as per Akbar Garcia, they cannot prepare the patient delivery because the patient consent is required  I tried calling the patient twice with Gee on the phone with no luck  Finally, a voicemail message was left instructing the patient to contact [facility] to authorize her Botox shipment

## 2022-01-25 NOTE — TELEPHONE ENCOUNTER
Botox  200 units is schedule for delivery on 1/26/2022  VIA: [employer] [facility]    Please inform of delivery    Radha Cobble

## 2022-01-26 ENCOUNTER — TELEPHONE (OUTPATIENT)
Dept: PSYCHIATRY | Facility: CLINIC | Age: 32
End: 2022-01-26

## 2022-01-26 NOTE — TELEPHONE ENCOUNTER
Botox number of units: 100   Botox quantity: 2  Arrived at what location: [place]   Botox at Correct Administering Location: no  NDC number: 5721-2010-38  Lot number: [id number]  Expiration Date: 02/29/24  Appt notes indicate correct medication: yes        botox being sent to [place] today

## 2022-02-07 ENCOUNTER — OFFICE VISIT (OUTPATIENT)
Dept: PSYCHIATRY | Facility: CLINIC | Age: 32
End: 2022-02-07
Payer: COMMERCIAL

## 2022-02-07 DIAGNOSIS — F41.1 GAD (GENERALIZED ANXIETY DISORDER): ICD-10-CM

## 2022-02-07 DIAGNOSIS — F33.1 MODERATE EPISODE OF RECURRENT MAJOR DEPRESSIVE DISORDER (HCC): Primary | ICD-10-CM

## 2022-02-07 DIAGNOSIS — F51.05 INSOMNIA DUE TO MENTAL CONDITION: ICD-10-CM

## 2022-02-07 DIAGNOSIS — Z72.89 SELF-INJURIOUS BEHAVIOR: ICD-10-CM

## 2022-02-07 PROCEDURE — 99214 OFFICE O/P EST MOD 30 MIN: CPT | Performed by: PSYCHIATRY & NEUROLOGY

## 2022-02-07 PROCEDURE — 1036F TOBACCO NON-USER: CPT | Performed by: PSYCHIATRY & NEUROLOGY

## 2022-02-07 RX ORDER — DULOXETIN HYDROCHLORIDE 60 MG/1
60 CAPSULE, DELAYED RELEASE ORAL DAILY
Qty: 90 CAPSULE | Refills: 1 | Status: SHIPPED | OUTPATIENT
Start: 2022-02-07 | End: 2022-08-04 | Stop reason: SDUPTHER

## 2022-02-07 RX ORDER — LAMOTRIGINE 100 MG/1
50 TABLET ORAL DAILY
Qty: 45 TABLET | Refills: 1 | Status: SHIPPED | OUTPATIENT
Start: 2022-02-07 | End: 2022-08-04 | Stop reason: SDUPTHER

## 2022-02-07 RX ORDER — BUSPIRONE HYDROCHLORIDE 15 MG/1
15 TABLET ORAL 2 TIMES DAILY
Qty: 180 TABLET | Refills: 1 | Status: SHIPPED | OUTPATIENT
Start: 2022-02-07 | End: 2022-08-04 | Stop reason: SDUPTHER

## 2022-02-07 RX ORDER — DULOXETIN HYDROCHLORIDE 30 MG/1
30 CAPSULE, DELAYED RELEASE ORAL
Qty: 90 CAPSULE | Refills: 1 | Status: SHIPPED | OUTPATIENT
Start: 2022-02-07 | End: 2022-08-04 | Stop reason: SDUPTHER

## 2022-02-07 RX ORDER — TRAZODONE HYDROCHLORIDE 50 MG/1
50 TABLET ORAL
Qty: 30 TABLET | Refills: 2 | Status: SHIPPED | OUTPATIENT
Start: 2022-02-07 | End: 2022-05-26
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
## 2022-02-21 ENCOUNTER — TELEPHONE (OUTPATIENT)
Dept: NEUROLOGY | Facility: CLINIC | Age: 32
End: 2022-02-21

## 2022-02-21 NOTE — TELEPHONE ENCOUNTER
Called pt to confirm botox on 2/24 with [address]  No answer, left voicemail to call back to confirm

## 2022-02-24 ENCOUNTER — PROCEDURE VISIT (OUTPATIENT)
Dept: NEUROLOGY | Facility: CLINIC | Age: 32
End: 2022-02-24
Payer: COMMERCIAL

## 2022-02-24 VITALS — DIASTOLIC BLOOD PRESSURE: 72 MMHG | SYSTOLIC BLOOD PRESSURE: 123 MMHG | TEMPERATURE: 96.8 F | HEART RATE: 72 BPM

## 2022-02-24 DIAGNOSIS — G43.709 CHRONIC MIGRAINE WITHOUT AURA: Primary | ICD-10-CM

## 2022-02-24 PROCEDURE — 64615 CHEMODENERV MUSC MIGRAINE: CPT | Performed by: PHYSICIAN ASSISTANT

## 2022-02-24 NOTE — PROGRESS NOTES
Universal Protocol   Consent: Verbal consent obtained  Written consent obtained    Risks and benefits: risks, benefits and alternatives were discussed  Consent given by: patient  Patient understanding: patient states understanding of the procedure being performed  Patient consent: the patient's understanding of the procedure matches consent given  Procedure consent: procedure consent matches procedure scheduled        Chemodenervation     Date/Time 2/24/2022 8:13 AM     Performed by  Camilla Varela PA-C     Authorized by Camilla Varela PA-C        Pre-procedure details      Prepped With: Alcohol     Procedure details     Position:  Upright   Botox     Botox Type:  Type A    Brand:  Botox    mL's of Botulinum Toxin:  195    Final Concentration per CC:  100 units    Needle Gauge:  30 G 2 5 inch   Procedures     Botox Procedures: chronic headache      Indications: migraines     Injection Location      Head / Face:  L superior trapezius, R superior trapezius, L superior cervical paraspinal, R superior cervical paraspinal, L , R , procerus, L temporalis, R temporalis, R frontalis, L frontalis, R medial occipitalis and L medial occipitalis    L  injection amount:  5 unit(s)    R  injection amount:  5 unit(s)    L lateral frontalis:  5 unit(s)    R lateral frontalis:  5 unit(s)    L medial frontalis:  5 unit(s)    R medial frontalis:  5 unit(s)    L temporalis injection amount:  20 unit(s)    R temporalis injection amount:  20 unit(s)    Procerus injection amount:  5 unit(s)    L medial occipitalis injection amount:  15 unit(s)    R medial occipitalis injection amount:  15 unit(s)    L superior cervical paraspinal injection amount:  10 unit(s)    R superior cervical paraspinal injection amount:  10 unit(s)    L superior trapezius injection amount:  15 unit(s)    R superior trapezius injection amount:  15 unit(s)   Total Units     Total units used:  195    Total units discarded:  5 Post-procedure details      Chemodenervation:  Chronic migraine    Facial Nerve Location[de-identified]  Bilateral facial nerve    Patient tolerance of procedure: Tolerated well, no immediate complications   Comments      Extra units medically necessary:  - 10 R masseter/ TMJ regions  - 10 L masseter/ TMJ regions  - 20 between both temporalis muscles         Blood pressure 123/72, pulse 72, temperature (!) 96 8 °F (36 °C), temperature source Temporal, not currently breastfeeding

## 2022-04-11 DIAGNOSIS — M79.7 FIBROMYALGIA: ICD-10-CM

## 2022-04-12 RX ORDER — PREGABALIN 150 MG/1
CAPSULE ORAL
Qty: 180 CAPSULE | Refills: 0 | Status: SHIPPED | OUTPATIENT
Start: 2022-04-12 | End: 2022-07-18

## 2022-04-18 ENCOUNTER — SOCIAL WORK (OUTPATIENT)
Dept: BEHAVIORAL/MENTAL HEALTH CLINIC | Facility: CLINIC | Age: 32
End: 2022-04-18
Payer: COMMERCIAL

## 2022-04-18 DIAGNOSIS — F33.1 MAJOR DEPRESSIVE DISORDER, RECURRENT, MODERATE (HCC): Primary | ICD-10-CM

## 2022-04-18 DIAGNOSIS — F41.1 GAD (GENERALIZED ANXIETY DISORDER): ICD-10-CM

## 2022-04-18 PROCEDURE — 90834 PSYTX W PT 45 MINUTES: CPT | Performed by: SOCIAL WORKER
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
## 2022-04-18 NOTE — PSYCH
Psychotherapy Provided: Individual Psychotherapy 50 minutes     Length of time in session: 50 minutes, follow up in 1 month    Encounter Diagnosis     ICD-10-CM    1  Major depressive disorder, recurrent, moderate (HCC)  F33 1    2  SHIRLEY (generalized anxiety disorder)  F41 1        Goals addressed in session: Goal 1, Goal 2 and Goal 3      Pain:      none    0    Current suicide risk : Low     D: Met with Catalina individually  "I feel really good'  Content with choice to resume medication  Session focused upon recent [employer] and upcoming Kate/ Bachelorette party  (coed)  Discussed struggles with 's mom - Micheal Kennedy asked to call Khris Holguin 'ma' and x wife disagrees  Work remains very stressful  Looking into a higher job with ability to supervise others as she didn't receive the best supervision  Discussed time frame of starting to try for children - thinking 6 months  A: Khris Holguin presented with appropriate mood and affect  Work remains stressful but excited by many future plans    P: Continue individual therapy    [address]: Diagnosis and Treatment Plan explained to Myra Taylor relates understanding diagnosis and is agreeable to Treatment Plan   Yes

## 2022-05-05 ENCOUNTER — TELEPHONE (OUTPATIENT)
Dept: NEUROLOGY | Facility: CLINIC | Age: 32
End: 2022-05-05

## 2022-05-05 NOTE — TELEPHONE ENCOUNTER
Spoke to Yonny Clifford Pharmacist at [facility] gave a verbal prescription for Botox 200 units with 3 refills       Medication is under review and will call back to schedule delivery

## 2022-05-13 ENCOUNTER — TELEPHONE (OUTPATIENT)
Dept: PSYCHIATRY | Facility: CLINIC | Age: 32
End: 2022-05-13

## 2022-05-13 NOTE — TELEPHONE ENCOUNTER
I left a message for Clayton Li offering an appointment on 5/19 at [address]   Asked to call back to confirm with interest

## 2022-05-26 ENCOUNTER — TELEPHONE (OUTPATIENT)
Dept: NEUROLOGY | Facility: CLINIC | Age: 32
End: 2022-05-26

## 2022-05-26 ENCOUNTER — NURSE TRIAGE (OUTPATIENT)
Dept: OTHER | Facility: OTHER | Age: 32
End: 2022-05-26

## 2022-05-26 ENCOUNTER — OFFICE VISIT (OUTPATIENT)
Dept: INTERNAL MEDICINE CLINIC | Facility: CLINIC | Age: 32
End: 2022-05-26
Payer: COMMERCIAL

## 2022-05-26 VITALS
TEMPERATURE: 97.9 F | BODY MASS INDEX: 30.42 KG/M2 | HEIGHT: 64 IN | SYSTOLIC BLOOD PRESSURE: 118 MMHG | WEIGHT: 178.2 LBS | OXYGEN SATURATION: 96 % | HEART RATE: 60 BPM | DIASTOLIC BLOOD PRESSURE: 82 MMHG

## 2022-05-26 DIAGNOSIS — J02.9 ACUTE PHARYNGITIS, UNSPECIFIED ETIOLOGY: Primary | ICD-10-CM

## 2022-05-26 LAB
S PYO AG THROAT QL: NEGATIVE
SARS-COV-2 AG UPPER RESP QL IA: NEGATIVE
VALID CONTROL: NORMAL

## 2022-05-26 PROCEDURE — 87811 SARS-COV-2 COVID19 W/OPTIC: CPT | Performed by: NURSE PRACTITIONER

## 2022-05-26 PROCEDURE — 87880 STREP A ASSAY W/OPTIC: CPT | Performed by: NURSE PRACTITIONER

## 2022-05-26 PROCEDURE — 99213 OFFICE O/P EST LOW 20 MIN: CPT | Performed by: NURSE PRACTITIONER

## 2022-05-26 NOTE — PROGRESS NOTES
Assessment/Plan:    Negative covid and strep  Recommend salt water gargles and tylenol as needed  Call is symptoms persist or worsen  Advised to repeat a home covid test if other symptoms develop  Diagnoses and all orders for this visit:    Acute pharyngitis, unspecified etiology  -     POCT rapid strepA  -     POCT Rapid Covid Ag          Subjective:      Patient ID: Mima Zhao is a 28 y o  female  Here today with complaints of sore throat  Symptoms started 3 days ago   No fevers or cough  She has right ear pain and a headache   She is taking tylenol   She is not vaccinated against covid           The following portions of the patient's history were reviewed and updated as appropriate: allergies, current medications, past family history, past medical history, past social history, past surgical history and problem list     Review of Systems   Constitutional: Negative for activity change, appetite change, fatigue and fever  HENT: Positive for ear pain and sore throat  Negative for congestion and trouble swallowing  Respiratory: Negative for cough and shortness of breath  Cardiovascular: Negative for chest pain  Gastrointestinal: Negative for abdominal pain  Neurological: Negative for dizziness, light-headedness and headaches  Objective:      /82   Pulse 60   Temp 97 9 °F (36 6 °C)   Ht 5' 4" (1 626 m)   Wt 80 8 kg (178 lb 3 2 oz)   SpO2 96%   BMI 30 59 kg/m²          Physical Exam  Vitals reviewed  Constitutional:       Appearance: Normal appearance  HENT:      Head: Normocephalic and atraumatic  Right Ear: Tympanic membrane, ear canal and external ear normal       Left Ear: Tympanic membrane, ear canal and external ear normal       Mouth/Throat:      Pharynx: Posterior oropharyngeal erythema present  No oropharyngeal exudate  Comments: Canker sore on left tonsil   Cardiovascular:      Rate and Rhythm: Normal rate and regular rhythm        Heart sounds: Normal heart sounds  Pulmonary:      Effort: Pulmonary effort is normal       Breath sounds: Normal breath sounds  Neurological:      Mental Status: She is alert and oriented to person, place, and time     Psychiatric:         Mood and Affect: Mood normal          Behavior: Behavior normal

## 2022-05-26 NOTE — TELEPHONE ENCOUNTER
Patient calling in complaining of sore throat and swollen tonsils  She would like an appointment for today  Patient scheduled for today at 1:30pm     Reason for Disposition   SEVERE (e g , excruciating) throat pain    Answer Assessment - Initial Assessment Questions  1  ONSET: "When did the throat start hurting?" (Hours or days ago)       Yesterday     2  SEVERITY: "How bad is the sore throat?" (Scale 1-10; mild, moderate or severe)    - MILD (1-3):  doesn't interfere with eating or normal activities    - MODERATE (4-7): interferes with eating some solids and normal activities    - SEVERE (8-10):  excruciating pain, interferes with most normal activities    - SEVERE DYSPHAGIA: can't swallow liquids, drooling      8/10    3  STREP EXPOSURE: "Has there been any exposure to strep within the past week?" If Yes, ask: "What type of contact occurred?"       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    4  VIRAL SYMPTOMS: "Are there any symptoms of a cold, such as a runny nose, cough, hoarse voice or red eyes?"       Slight cough    5  FEVER: "Do you have a fever?" If Yes, ask: "What is your temperature, how was it measured, and when did it start?"      Denies     6  PUS ON THE TONSILS: "Is there pus on the tonsils in the back of your throat?"     1 small white spot in back left of throat     7  OTHER SYMPTOMS: "Do you have any other symptoms?" (e g , difficulty breathing, headache, rash)      Denies     8   PREGNANCY: "Is there any chance you are pregnant?" "When was your last menstrual period?"      No    Protocols used: SORE THROAT-ADULT-

## 2022-05-26 NOTE — TELEPHONE ENCOUNTER
Called [facility] to schedule delivery    Botox 200 units  Qty:1  Delivery to [facility]   Scheduled 5/27/22  Via [employer]    Please advise if medication doesn't arrive

## 2022-05-27 NOTE — TELEPHONE ENCOUNTER
Botox number of units: 200  Botox quantity: 1  Arrived at what location: [facility] [place], [place]  Botox at [facility] Admin[facility] Location: yes  NDC number: [id number]  Lot number: [id number]   Expiration Date: 12/31/2024  Appt notes indicate correct medication: yes

## 2022-06-03 ENCOUNTER — TELEPHONE (OUTPATIENT)
Dept: NEUROLOGY | Facility: CLINIC | Age: 32
End: 2022-06-03

## 2022-06-03 ENCOUNTER — TELEPHONE (OUTPATIENT)
Dept: PSYCHIATRY | Facility: CLINIC | Age: 32
End: 2022-06-03

## 2022-06-03 ENCOUNTER — TELEPHONE (OUTPATIENT)
Dept: OTHER | Facility: OTHER | Age: 32
End: 2022-06-03

## 2022-06-03 NOTE — TELEPHONE ENCOUNTER
Called Patient left a message to scheduled appointment with Dr Kelby Aguero   Please offer the next availability on Provider scheduled/

## 2022-06-06 ENCOUNTER — PROCEDURE VISIT (OUTPATIENT)
Dept: NEUROLOGY | Facility: CLINIC | Age: 32
End: 2022-06-06
Payer: COMMERCIAL

## 2022-06-06 VITALS — SYSTOLIC BLOOD PRESSURE: 112 MMHG | TEMPERATURE: 97.4 F | HEART RATE: 80 BPM | DIASTOLIC BLOOD PRESSURE: 69 MMHG

## 2022-06-06 DIAGNOSIS — G43.709 CHRONIC MIGRAINE WITHOUT AURA WITHOUT STATUS MIGRAINOSUS, NOT INTRACTABLE: Primary | ICD-10-CM

## 2022-06-06 PROCEDURE — 64615 CHEMODENERV MUSC MIGRAINE: CPT | Performed by: PHYSICIAN ASSISTANT

## 2022-06-06 NOTE — PROGRESS NOTES
Universal Protocol   Consent: Verbal consent obtained  Written consent obtained    Risks and benefits: risks, benefits and alternatives were discussed  Consent given by: patient  Patient understanding: patient states understanding of the procedure being performed  Patient consent: the patient's understanding of the procedure matches consent given  Procedure consent: procedure consent matches procedure scheduled        Chemodenervation     Date/Time 6/6/2022 7:51 AM     Performed by  Moses Adorno PA-C     Authorized by Moses Adorno PA-C        Pre-procedure details      Prepped With: Alcohol     Procedure details     Position:  Upright   Botox     Botox Type:  Type A    Brand:  Botox    mL's of Botulinum Toxin:  195    Final Concentration per CC:  100 units    Needle Gauge:  30 G 2 5 inch   Procedures     Botox Procedures: chronic headache      Indications: migraines     Injection Location      Head / Face:  L superior trapezius, R superior trapezius, L superior cervical paraspinal, R superior cervical paraspinal, L , R , procerus, L temporalis, R temporalis, R frontalis, L frontalis, R medial occipitalis and L medial occipitalis    L  injection amount:  5 unit(s)    R  injection amount:  5 unit(s)    L lateral frontalis:  5 unit(s)    R lateral frontalis:  5 unit(s)    L medial frontalis:  5 unit(s)    R medial frontalis:  5 unit(s)    L temporalis injection amount:  20 unit(s)    R temporalis injection amount:  20 unit(s)    Procerus injection amount:  5 unit(s)    L medial occipitalis injection amount:  15 unit(s)    R medial occipitalis injection amount:  15 unit(s)    L superior cervical paraspinal injection amount:  10 unit(s)    R superior cervical paraspinal injection amount:  10 unit(s)    L superior trapezius injection amount:  15 unit(s)    R superior trapezius injection amount:  15 unit(s)   Total Units     Total units used:  195    Total units discarded:  5 Post-procedure details      Chemodenervation:  Chronic migraine    Facial Nerve Location[de-identified]  Bilateral facial nerve    Patient tolerance of procedure: Tolerated well, no immediate complications   Comments      Extra units medically necessary:  - 10 R masseter/ TMJ regions  - 10 L masseter/ TMJ regions  - 20 between both temporalis muscles       Blood pressure 112/69, pulse 80, temperature (!) 97 4 °F (36 3 °C), temperature source Temporal, not currently breastfeeding

## 2022-06-09 ENCOUNTER — OFFICE VISIT (OUTPATIENT)
Dept: INTERNAL MEDICINE CLINIC | Facility: CLINIC | Age: 32
End: 2022-06-09
Payer: COMMERCIAL

## 2022-06-09 VITALS
TEMPERATURE: 97.4 F | SYSTOLIC BLOOD PRESSURE: 104 MMHG | BODY MASS INDEX: 28.97 KG/M2 | WEIGHT: 169.7 LBS | HEIGHT: 64 IN | HEART RATE: 89 BPM | OXYGEN SATURATION: 99 % | DIASTOLIC BLOOD PRESSURE: 60 MMHG

## 2022-06-09 DIAGNOSIS — F51.05 INSOMNIA DUE TO MENTAL CONDITION: ICD-10-CM

## 2022-06-09 DIAGNOSIS — F33.1 MAJOR DEPRESSIVE DISORDER, RECURRENT, MODERATE (HCC): ICD-10-CM

## 2022-06-09 DIAGNOSIS — G43.709 CHRONIC MIGRAINE WITHOUT AURA WITHOUT STATUS MIGRAINOSUS, NOT INTRACTABLE: ICD-10-CM

## 2022-06-09 DIAGNOSIS — M06.09 RHEUMATOID ARTHRITIS OF MULTIPLE SITES WITH NEGATIVE RHEUMATOID FACTOR (HCC): Primary | ICD-10-CM

## 2022-06-09 DIAGNOSIS — F41.1 GAD (GENERALIZED ANXIETY DISORDER): ICD-10-CM

## 2022-06-09 DIAGNOSIS — M45.9 ANKYLOSING SPONDYLITIS OF UNSPECIFIED SITES IN SPINE (HCC): ICD-10-CM

## 2022-06-09 PROCEDURE — 99214 OFFICE O/P EST MOD 30 MIN: CPT | Performed by: NURSE PRACTITIONER

## 2022-06-09 PROCEDURE — 1036F TOBACCO NON-USER: CPT | Performed by: NURSE PRACTITIONER

## 2022-06-09 PROCEDURE — 3008F BODY MASS INDEX DOCD: CPT | Performed by: NURSE PRACTITIONER

## 2022-06-09 NOTE — PROGRESS NOTES
Assessment/Plan:    Chronic migraine without aura without status migrainosus, not intractable  Improved with botox treatments  Sees neuro  Rheumatoid arthritis of multiple sites with negative rheumatoid factor ([address] )  Off humira  Symptoms have been stable on lyrica  Sees rheumatology  Check labs  Major depressive disorder, recurrent, moderate (HCC)  Stable  On cymbalta, lamictal, and buspar per psychiatry     SHIRLEY (generalized anxiety disorder)  Stable  meds per psychiatry     Insomnia due to mental condition  On trazodone     BMI Counseling: Body mass index is 29 13 kg/m²  The BMI is above normal  Nutrition recommendations include reducing portion sizes and decreasing overall calorie intake  Exercise recommendations include exercising 3-5 times per week  Diagnoses and all orders for this visit:    Rheumatoid arthritis of multiple sites with negative rheumatoid factor (HCC)    Chronic migraine without aura without status migrainosus, not intractable    Major depressive disorder, recurrent, moderate (HCC)    Ankylosing spondylitis of unspecified sites in spine (HCC)    SHIRLEY (generalized anxiety disorder)    Insomnia due to mental condition          Subjective:      Patient ID: April Mercedes is a 28 y o  female  Here today for follow up  Promise Davidson is doing well  Her joint pains have been tolerable on lyrica  She is off humira  She has not had a migraine in the last few months  Since receiving botox treatments she has had less headaches overall  Her depression and anxiety are stable  The following portions of the patient's history were reviewed and updated as appropriate: allergies, current medications, past family history, past medical history, past social history, past surgical history and problem list     Review of Systems   Constitutional: Negative for activity change, appetite change, fatigue and unexpected weight change  Eyes: Negative for visual disturbance     Respiratory: Negative for cough and shortness of breath  Cardiovascular: Negative for chest pain, palpitations and leg swelling  Gastrointestinal: Negative for abdominal pain, constipation and diarrhea  Genitourinary: Negative for difficulty urinating  Musculoskeletal: Negative for arthralgias  Skin: Negative for rash  Neurological: Negative for dizziness, weakness, light-headedness and headaches  Psychiatric/Behavioral: Negative for dysphoric mood and sleep disturbance  The patient is not nervous/anxious  Objective:      /60   Pulse 89   Temp (!) 97 4 °F (36 3 °C)   Ht 5' 4" (1 626 m)   Wt 77 kg (169 lb 11 2 oz)   SpO2 99%   BMI 29 13 kg/m²          Physical Exam  Vitals reviewed  Constitutional:       Appearance: Normal appearance  HENT:      Head: Normocephalic and atraumatic  Eyes:      Conjunctiva/sclera: Conjunctivae normal    Cardiovascular:      Rate and Rhythm: Normal rate and regular rhythm  Heart sounds: Normal heart sounds  Pulmonary:      Effort: Pulmonary effort is normal       Breath sounds: Normal breath sounds  Skin:     General: Skin is warm and dry  Neurological:      Mental Status: She is alert and oriented to person, place, and time     Psychiatric:         Mood and Affect: Mood normal          Behavior: Behavior normal

## 2022-06-24 ENCOUNTER — APPOINTMENT (OUTPATIENT)
Dept: LAB | Facility: CLINIC | Age: 32
End: 2022-06-24
Payer: COMMERCIAL

## 2022-06-24 DIAGNOSIS — E78.2 MIXED HYPERLIPIDEMIA: ICD-10-CM

## 2022-06-24 DIAGNOSIS — M06.09 RHEUMATOID ARTHRITIS OF MULTIPLE SITES WITH NEGATIVE RHEUMATOID FACTOR (HCC): ICD-10-CM

## 2022-06-24 LAB
ALBUMIN SERPL BCP-MCNC: 4.2 G/DL (ref 3.5–5)
ALP SERPL-CCNC: 42 U/L (ref 34–104)
ALT SERPL W P-5'-P-CCNC: 14 U/L (ref 7–52)
ANION GAP SERPL CALCULATED.3IONS-SCNC: 6 MMOL/L (ref 4–13)
AST SERPL W P-5'-P-CCNC: 16 U/L (ref 13–39)
BASOPHILS # BLD AUTO: 0.05 THOUSANDS/ΜL (ref 0–0.1)
BASOPHILS NFR BLD AUTO: 1 % (ref 0–1)
BILIRUB SERPL-MCNC: 0.41 MG/DL (ref 0.2–1)
BUN SERPL-MCNC: 10 MG/DL (ref 5–25)
CALCIUM SERPL-MCNC: 9.2 MG/DL (ref 8.4–10.2)
CHLORIDE SERPL-SCNC: 103 MMOL/L (ref 96–108)
CHOLEST SERPL-MCNC: 254 MG/DL
CO2 SERPL-SCNC: 27 MMOL/L (ref 21–32)
CREAT SERPL-MCNC: 0.57 MG/DL (ref 0.6–1.3)
EOSINOPHIL # BLD AUTO: 0.16 THOUSAND/ΜL (ref 0–0.61)
EOSINOPHIL NFR BLD AUTO: 2 % (ref 0–6)
ERYTHROCYTE [DISTWIDTH] IN BLOOD BY AUTOMATED COUNT: 13.3 % (ref 11.6–15.1)
GFR SERPL CREATININE-BSD FRML MDRD: 124 ML/MIN/1.73SQ M
GLUCOSE P FAST SERPL-MCNC: 98 MG/DL (ref 65–99)
HCT VFR BLD AUTO: 40.5 % (ref 34.8–46.1)
HDLC SERPL-MCNC: 46 MG/DL
HGB BLD-MCNC: 13.5 G/DL (ref 11.5–15.4)
IMM GRANULOCYTES # BLD AUTO: 0.02 THOUSAND/UL (ref 0–0.2)
IMM GRANULOCYTES NFR BLD AUTO: 0 % (ref 0–2)
LDLC SERPL CALC-MCNC: 173 MG/DL (ref 0–100)
LYMPHOCYTES # BLD AUTO: 2.09 THOUSANDS/ΜL (ref 0.6–4.47)
LYMPHOCYTES NFR BLD AUTO: 28 % (ref 14–44)
MCH RBC QN AUTO: 28.5 PG (ref 26.8–34.3)
MCHC RBC AUTO-ENTMCNC: 33.3 G/DL (ref 31.4–37.4)
MCV RBC AUTO: 85 FL (ref 82–98)
MONOCYTES # BLD AUTO: 0.54 THOUSAND/ΜL (ref 0.17–1.22)
MONOCYTES NFR BLD AUTO: 7 % (ref 4–12)
NEUTROPHILS # BLD AUTO: 4.63 THOUSANDS/ΜL (ref 1.85–7.62)
NEUTS SEG NFR BLD AUTO: 62 % (ref 43–75)
NRBC BLD AUTO-RTO: 0 /100 WBCS
PLATELET # BLD AUTO: 261 THOUSANDS/UL (ref 149–390)
PMV BLD AUTO: 11.4 FL (ref 8.9–12.7)
POTASSIUM SERPL-SCNC: 4.7 MMOL/L (ref 3.5–5.3)
PROT SERPL-MCNC: 7 G/DL (ref 6.4–8.4)
RBC # BLD AUTO: 4.74 MILLION/UL (ref 3.81–5.12)
SODIUM SERPL-SCNC: 136 MMOL/L (ref 135–147)
TRIGL SERPL-MCNC: 175 MG/DL
WBC # BLD AUTO: 7.49 THOUSAND/UL (ref 4.31–10.16)

## 2022-06-24 PROCEDURE — 85025 COMPLETE CBC W/AUTO DIFF WBC: CPT

## 2022-06-24 PROCEDURE — 36415 COLL VENOUS BLD VENIPUNCTURE: CPT

## 2022-06-24 PROCEDURE — 80061 LIPID PANEL: CPT

## 2022-06-24 PROCEDURE — 80053 COMPREHEN METABOLIC PANEL: CPT

## 2022-07-04 ENCOUNTER — TELEPHONE (OUTPATIENT)
Dept: OTHER | Facility: OTHER | Age: 32
End: 2022-07-04

## 2022-07-04 NOTE — TELEPHONE ENCOUNTER
Patient called to cancel her appointment with Gordon Carl tomorrow @ 4pm and would like a call back to reschedule, did not wish to reschedule at time of call

## 2022-07-07 ENCOUNTER — TELEPHONE (OUTPATIENT)
Dept: PSYCHIATRY | Facility: CLINIC | Age: 32
End: 2022-07-07

## 2022-07-15 DIAGNOSIS — M79.7 FIBROMYALGIA: ICD-10-CM

## 2022-07-18 RX ORDER — PREGABALIN 150 MG/1
CAPSULE ORAL
Qty: 180 CAPSULE | Refills: 0 | Status: SHIPPED | OUTPATIENT
Start: 2022-07-18

## 2022-07-21 NOTE — PROGRESS NOTES
Assessment and Plan:   Patient is a 23-year-old female who presents for rheumatology follow-up for history of seronegative RA versus seronegative spondyloarthropathy  She has seen various rheumatologists over time and carried different diagnoses  Since I have followed with her, she was on treatment with Humira but has not had a typical clinical picture suggestive of inflammatory disease such as AS or RA  She has now been off Humira for about 1 year and is doing well  She has not developed any typical signs or symptoms of an inflammatory arthropathy  In the office today she has no signs of joint swelling or inflammation  She is actually doing well on medicinal marijuana  In addition she is going to be trying for pregnancy this coming winter and we discussed that I would suggest continuing to monitor her off any DMARDs and biologics especially without obvious active disease  She was in agreement with that plan and will keep me updated if any changes      Plan:  Diagnoses and all orders for this visit:    Fibromyalgia    Rheumatoid arthritis of multiple sites with negative rheumatoid factor ([facility] [address] )        Follow-up plan: 6 months        Rheumatic Disease Summary  1  Prior dx of Ankylosing spondylitis vs seronegative RA  -Multiple prior rheumatologists including Rosalind Rossi, Dr Gene [facility]), then est with Dr Jeremiah Olivares 7/2015 for previous dx of HLA-B27 negative AS, Sjogrens, and fibromyalgia; cousin with AS, mom with RA    -Lost to f/u with Jeremiah Olivares since 4/2016, then followed with [facility] rheum (did not think she had AS, possibly seroneg, thought mainly fibromyalgia), now returning to [facility] with me on 2/25/19  -Labs 7/2017: negative HLA-B27, RF, CCP, DORON, dsDNA, SSA, SSB, smith, RNP, C3/4, Hep B/C  -XR SI/lumbar 8/2016: mild facet arthropathy, normal SI with erosions or ankylosis   -XR hands/feet 6/2017: normal, no erosions   -MRI cervical spine 8/2017: normal   -Meds history:    -Multiple NSAIDs (nabumetone, meloxicam, naproxen, diclofenac, daypro, lodine)    -Sulfasalazine    -Oral MTX 15mg/week (2015-4/2016)    -Humira (9/2015-4/2016, effective but afraid needles)    -Otezla (2016, D/C due to vomiting and depression)    -Michael Nina (2016, x6 months, then stopped working)    -Xeljanz for ?seroneg RA ([facility]) (1/2018-2/2019, ineffective)  -Visit 2/25/19: no active synovitis to suggest RA, however patient insistent on stopping xeljanz and going back on humira; exam most notable for fibromyalgia  -Humira started 5/2019 with improvement in generalized pain    -Visit 12/3/19: stable on humira, no changes   -Visit 9/14/20: achilles tendonitis, medrol dosepack, cont humira   -Visit 3/15/21: stable on humira, no active disease   -Visit 7/22/22: off humira x1 year without any signs of IA, cont to monitor off treatment given unclear dx; also pregnancy planning for winter this year   2  Fibromyalgia   -Lyrica beneficial   -Suggested discussing with psych about switching to Cymbalta  3  Drug toxicity monitoring   4  Comorbidities: migraines, small right supraspinatus tear, anxiety, depression,s/p rectal prolapse surgery      HPI  Marylou Quiros is a 33 y o   female who presents for rheumatology follow-up regarding seronegative RA     She has not been seen in the office since March 2021  At that point, she was doing well on Humira  She subsequently stopped the Humira as of summer 2021 due to plans to become pregnant  Patient reports that she overall has been doing well  She is not currently struggling with a lot of significant joint pain, just mild joint pain at times  The main areas are the hip and lower back, knees, sometimes the hands  Sometimes will use advil  Has been using medicinal marijuana which she feels has worked really well for her pain and also helps her relax  Her and her  are planning to try for pregnancy coming up this winter    She is aware she would have to come off the medical marijuana  After stopping Humira about 1 year ago, she has not noticed much change or difference in any of her symptoms  The following portions of the patient's history were reviewed and updated as appropriate: allergies, current medications, past family history, past medical history, past social history, past surgical history and problem list     Review of Systems:   Review of Systems   Musculoskeletal: Positive for arthralgias, back pain and myalgias  Negative for joint swelling  Skin: Negative for rash  Home Medications:    Current Outpatient Medications:     Adalimumab (Humira Pen) 40 MG/0 8ML PNKT, Inject 0 8 mL (40 mg total) under the skin every 14 (fourteen) days, Disp: 5 each, Rfl: 3    busPIRone (BUSPAR) 15 mg tablet, Take 1 tablet (15 mg total) by mouth 2 (two) times a day, Disp: 180 tablet, Rfl: 1    DULoxetine (CYMBALTA) 30 mg delayed release capsule, Take 1 capsule (30 mg total) by mouth daily at bedtime, Disp: 90 capsule, Rfl: 1    DULoxetine (CYMBALTA) 60 mg delayed release capsule, Take 1 capsule (60 mg total) by mouth daily, Disp: 90 capsule, Rfl: 1    lamoTRIgine (LaMICtal) 100 mg tablet, Take 0 5 tablets (50 mg total) by mouth daily, Disp: 45 tablet, Rfl: 1    levonorgestrel (MIRENA) 20 MCG/24HR IUD, 1 each by Intrauterine route once, Disp: , Rfl:     pregabalin (LYRICA) 150 mg capsule, TAKE 1 CAPSULE TWICE A DAY, Disp: 180 capsule, Rfl: 0    traZODone (DESYREL) 50 mg tablet, TAKE 1 TABLET DAILY AT BEDTIME AS NEEDED FOR SLEEP, Disp: 30 tablet, Rfl: 2    Objective:    Vitals:    07/22/22 1340   BP: 128/72   Pulse: 85   SpO2: 97%   Weight: 82 4 kg (181 lb 9 6 oz)   Height: 5' 4" (1 626 m)       Physical Exam  Constitutional:       General: She is not in acute distress  HENT:      Head: Normocephalic and atraumatic  Eyes:      Conjunctiva/sclera: Conjunctivae normal    Pulmonary:      Effort: Pulmonary effort is normal  No respiratory distress     Musculoskeletal:      Cervical back: Neck supple  Comments: No joint swelling or synovitis anywhere  Skin:     Coloration: Skin is not pale  Neurological:      Mental Status: She is alert  Mental status is at baseline     Psychiatric:         Mood and Affect: Mood normal          Behavior: Behavior normal          Labs:   Component      Latest Ref Rng & Units 6/24/2022   WBC      4 31 - 10 16 Thousand/uL 7 49   Red Blood Cell Count      3 81 - 5 12 Million/uL 4 74   Hemoglobin      11 5 - 15 4 g/dL 13 5   HCT      34 8 - 46 1 % 40 5   MCV      82 - 98 fL 85   MCH      26 8 - 34 3 pg 28 5   MCHC      31 4 - 37 4 g/dL 33 3   RDW      11 6 - 15 1 % 13 3   MPV      8 9 - 12 7 fL 11 4   Platelet Count      092 - 390 Thousands/uL 261   nRBC      /100 WBCs 0   Neutrophils %      43 - 75 % 62   Immat GRANS %      0 - 2 % 0   Lymphocytes Relative      14 - 44 % 28   Monocytes Relative      4 - 12 % 7   Eosinophils      0 - 6 % 2   Basophils Relative      0 - 1 % 1   Absolute Neutrophils      1 85 - 7 62 Thousands/µL 4 63   Immature Grans Absolute      0 00 - 0 20 Thousand/uL 0 02   Lymphocytes Absolute      0 60 - 4 47 Thousands/µL 2 09   Absolute Monocytes      0 17 - 1 22 Thousand/µL 0 54   Absolute Eosinophils      0 00 - 0 61 Thousand/µL 0 16   Basophils Absolute      0 00 - 0 10 Thousands/µL 0 05   Sodium      135 - 147 mmol/L 136   Potassium      3 5 - 5 3 mmol/L 4 7   Chloride      96 - 108 mmol/L 103   CO2      21 - 32 mmol/L 27   Anion Gap      4 - 13 mmol/L 6   BUN      5 - 25 mg/dL 10   Creatinine      0 60 - 1 30 mg/dL 0 57 (L)   GLUCOSE FASTING      65 - 99 mg/dL 98   Calcium      8 4 - 10 2 mg/dL 9 2   AST      13 - 39 U/L 16   ALT      7 - 52 U/L 14   Alkaline Phosphatase      34 - 104 U/L 42   Total Protein      6 4 - 8 4 g/dL 7 0   Albumin      3 5 - 5 0 g/dL 4 2   TOTAL BILIRUBIN      0 20 - 1 00 mg/dL 0 41   eGFR      ml/min/1 73sq m 124

## 2022-07-22 ENCOUNTER — OFFICE VISIT (OUTPATIENT)
Dept: RHEUMATOLOGY | Facility: CLINIC | Age: 32
End: 2022-07-22
Payer: COMMERCIAL

## 2022-07-22 VITALS
OXYGEN SATURATION: 97 % | DIASTOLIC BLOOD PRESSURE: 72 MMHG | HEIGHT: 64 IN | SYSTOLIC BLOOD PRESSURE: 128 MMHG | HEART RATE: 85 BPM | WEIGHT: 181.6 LBS | BODY MASS INDEX: 31 KG/M2

## 2022-07-22 DIAGNOSIS — M79.7 FIBROMYALGIA: Primary | ICD-10-CM

## 2022-07-22 DIAGNOSIS — M06.09 RHEUMATOID ARTHRITIS OF MULTIPLE SITES WITH NEGATIVE RHEUMATOID FACTOR (HCC): ICD-10-CM

## 2022-07-22 PROCEDURE — 99214 OFFICE O/P EST MOD 30 MIN: CPT | Performed by: INTERNAL MEDICINE

## 2022-08-03 NOTE — TELEPHONE ENCOUNTER
Pt called needing to schedule an appt  Writer informed pt that she would be contacted to schedule  Please reach out with next availability  Pt was rescheduling from a cx appt on 7/7/22  Thank you

## 2022-08-04 ENCOUNTER — TELEMEDICINE (OUTPATIENT)
Dept: PSYCHIATRY | Facility: CLINIC | Age: 32
End: 2022-08-04
Payer: COMMERCIAL

## 2022-08-04 DIAGNOSIS — F33.1 MODERATE EPISODE OF RECURRENT MAJOR DEPRESSIVE DISORDER (HCC): ICD-10-CM

## 2022-08-04 DIAGNOSIS — Z72.89 SELF-INJURIOUS BEHAVIOR: ICD-10-CM

## 2022-08-04 DIAGNOSIS — F41.1 GAD (GENERALIZED ANXIETY DISORDER): ICD-10-CM

## 2022-08-04 DIAGNOSIS — F51.05 INSOMNIA DUE TO MENTAL CONDITION: ICD-10-CM

## 2022-08-04 PROCEDURE — 3725F SCREEN DEPRESSION PERFORMED: CPT | Performed by: PSYCHIATRY & NEUROLOGY

## 2022-08-04 PROCEDURE — 99214 OFFICE O/P EST MOD 30 MIN: CPT | Performed by: PSYCHIATRY & NEUROLOGY

## 2022-08-04 RX ORDER — BUSPIRONE HYDROCHLORIDE 15 MG/1
15 TABLET ORAL 2 TIMES DAILY
Qty: 180 TABLET | Refills: 1 | Status: SHIPPED | OUTPATIENT
Start: 2022-08-04

## 2022-08-04 RX ORDER — DULOXETIN HYDROCHLORIDE 30 MG/1
30 CAPSULE, DELAYED RELEASE ORAL
Qty: 90 CAPSULE | Refills: 1 | Status: SHIPPED | OUTPATIENT
Start: 2022-08-04

## 2022-08-04 RX ORDER — CLINDAMYCIN PHOSPHATE AND BENZOYL PEROXIDE 10; 50 MG/G; MG/G
GEL TOPICAL
COMMUNITY
Start: 2022-07-25

## 2022-08-04 RX ORDER — TRAZODONE HYDROCHLORIDE 50 MG/1
50-100 TABLET ORAL
Qty: 135 TABLET | Refills: 1 | Status: SHIPPED | OUTPATIENT
Start: 2022-08-04

## 2022-08-04 RX ORDER — LAMOTRIGINE 100 MG/1
50 TABLET ORAL DAILY
Qty: 45 TABLET | Refills: 1 | Status: SHIPPED | OUTPATIENT
Start: 2022-08-04

## 2022-08-04 RX ORDER — DULOXETIN HYDROCHLORIDE 60 MG/1
60 CAPSULE, DELAYED RELEASE ORAL DAILY
Qty: 90 CAPSULE | Refills: 1 | Status: SHIPPED | OUTPATIENT
Start: 2022-08-04
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
## 2022-08-05 ENCOUNTER — TELEPHONE (OUTPATIENT)
Dept: PSYCHIATRY | Facility: CLINIC | Age: 32
End: 2022-08-05

## 2022-08-05 NOTE — TELEPHONE ENCOUNTER
Writer called the pt and left a message to cx todays appt on 8/5/22 with Marcie Hassan due to the provider is out of the office, There are no- follow-up scheduled at this time  Thank you

## 2022-08-08 ENCOUNTER — TELEPHONE (OUTPATIENT)
Dept: PSYCHIATRY | Facility: CLINIC | Age: 32
End: 2022-08-08

## 2022-08-08 NOTE — TELEPHONE ENCOUNTER
Writer left a message to offer an appt with Dillan Llamas on 8/9/22 tomorrow at 10am if pt can not please offer Thursday 8/11/22 at 5pm as second option   Writer left office number to give us a call back at earliest convenience if  they would like to accept, Thank you

## 2022-08-11 ENCOUNTER — SOCIAL WORK (OUTPATIENT)
Dept: BEHAVIORAL/MENTAL HEALTH CLINIC | Facility: CLINIC | Age: 32
End: 2022-08-11
Payer: COMMERCIAL

## 2022-08-11 DIAGNOSIS — F33.1 MAJOR DEPRESSIVE DISORDER, RECURRENT, MODERATE (HCC): Primary | ICD-10-CM

## 2022-08-11 DIAGNOSIS — F41.1 GAD (GENERALIZED ANXIETY DISORDER): ICD-10-CM

## 2022-08-11 PROCEDURE — 90834 PSYTX W PT 45 MINUTES: CPT | Performed by: SOCIAL WORKER
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
## 2022-08-22 ENCOUNTER — ANNUAL EXAM (OUTPATIENT)
Dept: OBGYN CLINIC | Facility: CLINIC | Age: 32
End: 2022-08-22
Payer: COMMERCIAL

## 2022-08-22 VITALS
BODY MASS INDEX: 29.53 KG/M2 | WEIGHT: 173 LBS | SYSTOLIC BLOOD PRESSURE: 118 MMHG | HEIGHT: 64 IN | DIASTOLIC BLOOD PRESSURE: 68 MMHG

## 2022-08-22 DIAGNOSIS — Z12.4 ENCOUNTER FOR SCREENING FOR MALIGNANT NEOPLASM OF CERVIX: ICD-10-CM

## 2022-08-22 DIAGNOSIS — Z01.419 WELL WOMAN EXAM WITH ROUTINE GYNECOLOGICAL EXAM: Primary | ICD-10-CM

## 2022-08-22 DIAGNOSIS — Z11.51 SCREENING FOR HPV (HUMAN PAPILLOMAVIRUS): ICD-10-CM

## 2022-08-22 DIAGNOSIS — Z30.431 IUD CHECK UP: ICD-10-CM

## 2022-08-22 PROCEDURE — S0612 ANNUAL GYNECOLOGICAL EXAMINA: HCPCS | Performed by: OBSTETRICS & GYNECOLOGY

## 2022-08-22 PROCEDURE — G0476 HPV COMBO ASSAY CA SCREEN: HCPCS | Performed by: OBSTETRICS & GYNECOLOGY

## 2022-08-22 PROCEDURE — G0145 SCR C/V CYTO,THINLAYER,RESCR: HCPCS | Performed by: OBSTETRICS & GYNECOLOGY

## 2022-08-22 NOTE — PROGRESS NOTES
ASSESSMENT & PLAN:   Diagnoses and all orders for this visit:    Well woman exam with routine gynecological exam  -     Liquid-based pap, screening    Encounter for screening for malignant neoplasm of cervix  -     Liquid-based pap, screening    Screening for HPV (human papillomavirus)  -     Liquid-based pap, screening    IUD check up   - will return for removal in the fall  The following were reviewed in today's visit: ASCCP guidelines, Gardisil vaccination, STD testing breast self exam, use and side effects of OCPs, family planning choices, exercise and healthy diet  Patient to return to office in yearly for annual exam      All questions have been answered to her satisfaction  CC:  Annual Gynecologic Examination  Chief Complaint   Patient presents with    Gynecologic Exam     PT is here for her annual exam and pap smear; last pap was in 2020  Pt is doing well, no complaints  HPI: Cecilia Moser is a 33 y o  Ty Amelia who presents for annual gynecologic examination  She has the following concerns:  She is getting  in 10 days  Plans to try to conceive soon after wedding, this winter  Will return for IUD removal  Patient is taking a prenatal vitamin         Health Maintenance:    Exercise: frequently  Breast exams/breast awareness: no  Diet: well balanced diet      Past Medical History:   Diagnosis Date    Abdominal pain, generalized 8/14/2018    Added automatically from request for surgery [id number]    Anal pain 8/10/2020    Anxiety     Chronic migraine 1/15/2018    Chronic periscapular pain on right side 8/17/2018    Depression     Eating disorder     occasional purging    Fibromyalgia, primary     Fissure, anal 4/8/2019    Head injury due to trauma 3/29/2018    Herpes     Incomplete tear of right rotator cuff 1/5/2018    Irritable bowel syndrome (IBS)     Long term use of drug 7/25/2019    Microscopic hematuria 8/11/2017    Myofascial pain 7/30/2015    Numbness and tingling 9/29/2017    PONV (postoperative nausea and vomiting)     PTSD (post-traumatic stress disorder)     RA (rheumatoid arthritis) (HCC)     Rectal bleeding 4/8/2019    Right ovarian cyst 8/14/2017    Substance abuse ([facility] [address] )     marijuana use    Varicella     Yeast infection     chronic       Past Surgical History:   Procedure Laterality Date    COLONOSCOPY      MANDIBLE SURGERY      AK COLONOSCOPY FLX DX W/COLLJ SPEC WHEN PFRMD N/A 10/8/2018    Procedure: COLONOSCOPY;  Surgeon: Florecita Sanchez MD;  [facility]; Service: Gastroenterology    AK FIX RECTAL PROLAPSE,PERINEAL APPR N/A 12/23/2020    Procedure: Haywood Runner;  Surgeon: Catheline Favre, MD;  [facility];  Service: Colorectal    REDUCTION MAMMAPLASTY         Past OB/Gyn History:   No LMP recorded  (Menstrual status: Amenorrheic other)  Last Pap: 2021 : no abnormalities  History of abnormal Pap smear: no  HPV vaccine completed: yes    Patient is currently sexually active  STD testing: no  Current contraception: IUD      Family History  Family History   Problem Relation Age of Onset    Osteoporosis Mother     Fibromyalgia Mother     Rheum arthritis Mother     Ulcerative colitis Mother     Cancer Father         adenocarcinoma    No Known Problems Brother     Hypertension Family     Ulcers Family         peptic    Kidney disease Family     Alcohol abuse Neg Hx     Substance Abuse Neg Hx     Mental illness Neg Hx     Depression Neg Hx        Family history of uterine or ovarian cancer: no  Family history of breast cancer: no  Family history of colon cancer: no    Social History:  Social History     Socioeconomic History    Marital status: Single     Spouse name: Not on file    Number of children: 0    Years of education: Currently in school    Highest education level:  Bachelor's degree (e g , BA, AB, BS)   Occupational History    Occupation: Teacher     Employer: [employer] Tobacco Use    Smoking status: Never Smoker    Smokeless tobacco: Never Used   Vaping Use    Vaping Use: Never used   Substance and Sexual Activity    Alcohol use: Yes     Comment: socially    Drug use: Yes     Frequency: 7 0 times per week     Types: Marijuana     Comment: MEDICAL CARD    Sexual activity: Yes     Partners: Male     Birth control/protection: I U D  Other Topics Concern    Not on file   Social History Narrative    Daily coffee consumption, 1 cup per day    Self injurious behavior     Social Determinants of Health     Financial Resource Strain: Not on file   Food Insecurity: Not on file   Transportation Needs: Not on file   Physical Activity: Not on file   Stress: Not on file   Social Connections: Not on file   Intimate Partner Violence: Not on file   Housing Stability: Not on file     Domestic violence screen: negative    Allergies:  No Known Allergies    Medications:    Current Outpatient Medications:     busPIRone (BUSPAR) 15 mg tablet, Take 1 tablet (15 mg total) by mouth 2 (two) times a day, Disp: 180 tablet, Rfl: 1    Clindamycin Phos-Benzoyl Perox gel, EVERY NIGHT AT BEDTIME TO FACE ALL OVER FOR ACNE  KEEP IN THE REFRIGERATOR   DISCARD AFTER 2 MONTHS, Disp: , Rfl:     DULoxetine (CYMBALTA) 30 mg delayed release capsule, Take 1 capsule (30 mg total) by mouth daily at bedtime, Disp: 90 capsule, Rfl: 1    DULoxetine (CYMBALTA) 60 mg delayed release capsule, Take 1 capsule (60 mg total) by mouth daily, Disp: 90 capsule, Rfl: 1    lamoTRIgine (LaMICtal) 100 mg tablet, Take 0 5 tablets (50 mg total) by mouth daily, Disp: 45 tablet, Rfl: 1    levonorgestrel (MIRENA) 20 MCG/24HR IUD, 1 each by Intrauterine route once, Disp: , Rfl:     pregabalin (LYRICA) 150 mg capsule, TAKE 1 CAPSULE TWICE A DAY, Disp: 180 capsule, Rfl: 0    traZODone (DESYREL) 50 mg tablet, Take 1-2 tablets ( mg total) by mouth daily at bedtime as needed for sleep, Disp: 135 tablet, Rfl: 1    Review of Systems:  Review of Systems   Constitutional: Negative for chills and fever  Respiratory: Negative for cough and shortness of breath  Cardiovascular: Negative for chest pain and palpitations  Gastrointestinal: Negative for abdominal distention, abdominal pain, blood in stool, constipation, nausea and vomiting  Genitourinary: Negative for difficulty urinating, dyspareunia, dysuria, frequency, menstrual problem, pelvic pain, urgency, vaginal bleeding, vaginal discharge and vaginal pain  Neurological: Negative for headaches  Physical Exam:  /68 (BP Location: Right arm, Patient Position: Sitting, Cuff Size: Large)   Ht 5' 4" (1 626 m)   Wt 78 5 kg (173 lb)   BMI 29 70 kg/m²    Physical Exam  Constitutional:       General: She is awake  Appearance: Normal appearance  She is well-developed  Genitourinary:      Vulva, bladder and urethral meatus normal       Right Labia: No rash, tenderness or lesions  Left Labia: No tenderness, lesions or rash  No labial fusion noted  No vaginal discharge, erythema, tenderness or bleeding  No vaginal prolapse present  No vaginal atrophy present  Right Adnexa: not tender, not full and no mass present  Left Adnexa: not tender, not full and no mass present  No cervical motion tenderness, discharge, lesion or polyp  IUD strings visualized (in os)  Uterus is not enlarged, tender or irregular  No uterine mass detected  No urethral prolapse present  Bladder is not tender  Pelvic exam was performed with patient in the lithotomy position  Breasts:      Right: No inverted nipple, mass, nipple discharge, skin change, tenderness, axillary adenopathy or supraclavicular adenopathy  Left: No inverted nipple, mass, nipple discharge, skin change, tenderness, axillary adenopathy or supraclavicular adenopathy  Breast exam comments: B/l mammaplasty scars present        HENT:      Head: Normocephalic and atraumatic  Cardiovascular:      Rate and Rhythm: Normal rate and regular rhythm  Heart sounds: Normal heart sounds  Pulmonary:      Effort: Pulmonary effort is normal  No tachypnea or respiratory distress  Breath sounds: Normal breath sounds  Abdominal:      General: Abdomen is flat  There is no distension  Palpations: Abdomen is soft  Tenderness: There is no abdominal tenderness  There is no guarding or rebound  Musculoskeletal:      Cervical back: Neck supple  Lymphadenopathy:      Upper Body:      Right upper body: No supraclavicular or axillary adenopathy  Left upper body: No supraclavicular or axillary adenopathy  Neurological:      General: No focal deficit present  Mental Status: She is alert and oriented to person, place, and time  Psychiatric:         Mood and Affect: Mood normal          Behavior: Behavior normal          Thought Content: Thought content normal          Judgment: Judgment normal    Vitals reviewed

## 2022-08-24 NOTE — RESULT ENCOUNTER NOTE
Marie Silva,     Your HPV testing is negative  Your pap is still pending, and will be updated soon  Please contact the office at [phone] with any questions       Theo

## 2022-08-26 LAB
LAB AP GYN PRIMARY INTERPRETATION: NORMAL
Lab: NORMAL

## 2022-08-30 ENCOUNTER — OFFICE VISIT (OUTPATIENT)
Dept: INTERNAL MEDICINE CLINIC | Facility: CLINIC | Age: 32
End: 2022-08-30
Payer: COMMERCIAL

## 2022-08-30 VITALS
OXYGEN SATURATION: 97 % | WEIGHT: 175 LBS | HEIGHT: 64 IN | HEART RATE: 91 BPM | TEMPERATURE: 96.3 F | SYSTOLIC BLOOD PRESSURE: 116 MMHG | DIASTOLIC BLOOD PRESSURE: 80 MMHG | BODY MASS INDEX: 29.88 KG/M2

## 2022-08-30 DIAGNOSIS — M06.09 RHEUMATOID ARTHRITIS OF MULTIPLE SITES WITH NEGATIVE RHEUMATOID FACTOR (HCC): ICD-10-CM

## 2022-08-30 DIAGNOSIS — Z00.00 ANNUAL PHYSICAL EXAM: Primary | ICD-10-CM

## 2022-08-30 DIAGNOSIS — E78.2 MIXED HYPERLIPIDEMIA: ICD-10-CM

## 2022-08-30 DIAGNOSIS — F33.1 MAJOR DEPRESSIVE DISORDER, RECURRENT, MODERATE (HCC): ICD-10-CM

## 2022-08-30 DIAGNOSIS — M45.9 ANKYLOSING SPONDYLITIS OF UNSPECIFIED SITES IN SPINE (HCC): ICD-10-CM

## 2022-08-30 DIAGNOSIS — Z11.1 SCREENING FOR TUBERCULOSIS: ICD-10-CM

## 2022-08-30 PROCEDURE — 86580 TB INTRADERMAL TEST: CPT | Performed by: NURSE PRACTITIONER

## 2022-08-30 PROCEDURE — 99395 PREV VISIT EST AGE 18-39: CPT | Performed by: NURSE PRACTITIONER

## 2022-08-30 NOTE — PROGRESS NOTES
[facility]    NAME: Senia Carrasco  AGE: 33 y o  SEX: female  : 1990     DATE: 2022     Assessment and Plan:     Problem List Items Addressed This Visit        Musculoskeletal and Integument    Rheumatoid arthritis of multiple sites with negative rheumatoid factor ([facility] [place] [address] )     Off humira  Symptoms stable  On lyrica  Sees rheumatology          Ankylosing spondylitis of unspecified sites in spine ([facility] [address] )     On lyrica and cymbalta  Other    Major depressive disorder, recurrent, moderate (HCC)    Mixed hyperlipidemia      Other Visit Diagnoses     Annual physical exam    -  Primary    Screening for tuberculosis        Relevant Orders    TB Skin Test (Completed)          Immunizations and preventive care screenings were discussed with patient today  Appropriate education was printed on patient's after visit summary  Return in about 6 months (around 2023) for Next scheduled follow up  Chief Complaint:     Chief Complaint   Patient presents with    Annual Exam      History of Present Illness:     Adult Annual Physical   Patient here for a comprehensive physical exam  The patient reports no problems  Getting  Saturday  Diet and Physical Activity  · Diet/Nutrition: well balanced diet  · Exercise: no formal exercise  Depression Screening  PHQ-2/9 Depression Screening         General Health  · Sleep: sleeps well  · Hearing: normal - none   · Vision: most recent eye exam >1 year ago and wears glasses  · Dental: regular dental visits  /GYN Health  · Last menstrual period: IUD  · Contraceptive method: IUD placement  Review of Systems:     Review of Systems   Constitutional: Negative for activity change, appetite change, fatigue and unexpected weight change  Eyes: Negative for visual disturbance     Respiratory: Negative for cough, chest tightness and shortness of breath  Cardiovascular: Negative for chest pain, palpitations and leg swelling  Gastrointestinal: Negative for abdominal pain, blood in stool, constipation and diarrhea  Genitourinary: Negative for difficulty urinating  Musculoskeletal: Negative for arthralgias  Skin: Negative for rash  Neurological: Negative for dizziness, weakness, light-headedness and headaches  Psychiatric/Behavioral: Negative for dysphoric mood and sleep disturbance  The patient is not nervous/anxious  Past Medical History:     Past Medical History:   Diagnosis Date    Abdominal pain, generalized 8/14/2018    Added automatically from request for surgery [id number]    Anal pain 8/10/2020    Anxiety     Chronic migraine 1/15/2018    Chronic periscapular pain on right side 8/17/2018    Depression     Eating disorder     occasional purging    Fibromyalgia, primary     Fissure, anal 4/8/2019    Head injury due to trauma 3/29/2018    Herpes     Incomplete tear of right rotator cuff 1/5/2018    Irritable bowel syndrome (IBS)     Long term use of drug 7/25/2019    Microscopic hematuria 8/11/2017    Myofascial pain 7/30/2015    Numbness and tingling 9/29/2017    PONV (postoperative nausea and vomiting)     PTSD (post-traumatic stress disorder)     RA [facility]oid [facility]) ([facility] [address] )     Rectal bleeding 4/8/2019    Right ovarian cyst 8/14/2017    [facility]     marijuana use    Varicella     Yeast infection     chronic      Past Surgical History:     Past Surgical History:   Procedure Laterality Date    COLONOSCOPY      MANDIBLE SURGERY      IA COLONOSCOPY FLX DX W/COLLJ SPEC WHEN PFRMD N/A 10/8/2018    Procedure: COLONOSCOPY;  Surgeon: Lui Capps MD;  [facility];   Service: Gastroenterology    IA FIX RECTAL PROLAPSE,PERINEAL APPR N/A 12/23/2020    Procedure: Daphne Oh;  Surgeon: Kris Rodriguez MD;  [facility];  Service: Colorectal    REDUCTION MAMMAPLASTY Social History:     Social History     Socioeconomic History    Marital status: Single     Spouse name: None    Number of children: 0    Years of education: Currently in school    Highest education level: Bachelor's degree (e g , BA, AB, BS)   Occupational History    Occupation: Teacher     Employer: [employer]   Tobacco Use    Smoking status: Never Smoker    Smokeless tobacco: Never Used   Vaping Use    Vaping Use: Never used   Substance and Sexual Activity    Alcohol use: Yes     Comment: socially    Drug use: Yes     Frequency: 7 0 times per week     Types: Marijuana     Comment: MEDICAL CARD    Sexual activity: Yes     Partners: Male     Birth control/protection: I U D  Other Topics Concern    None   Social History Narrative    Daily coffee consumption, 1 cup per day    Self injurious behavior     Social Determinants of Health     Financial Resource Strain: Not on file   Food Insecurity: Not on file   Transportation Needs: Not on file   Physical Activity: Not on file   Stress: Not on file   Social Connections: Not on file   Intimate Partner Violence: Not on file   Housing Stability: Not on file      Family History:     Family History   Problem Relation Age of Onset    Osteoporosis Mother    [place] Fibromyalgia Mother     Rheum arthritis Mother     Ulcerative colitis Mother     Cancer Father         adenocarcinoma    No Known Problems Brother     Hypertension Family     Ulcers Family         peptic    Kidney disease Family     Alcohol abuse Neg Hx     Substance Abuse Neg Hx     Mental illness Neg Hx     Depression Neg Hx       Current Medications:     Current Outpatient Medications   Medication Sig Dispense Refill    busPIRone (BUSPAR) 15 mg tablet Take 1 tablet (15 mg total) by mouth 2 (two) times a day 180 tablet 1    Clindamycin Phos-Benzoyl Perox gel EVERY NIGHT AT BEDTIME TO FACE ALL OVER FOR ACNE  KEEP IN THE REFRIGERATOR   DISCARD AFTER 2 MONTHS      DULoxetine (CYMBALTA) 30 mg delayed release capsule Take 1 capsule (30 mg total) by mouth daily at bedtime 90 capsule 1    DULoxetine (CYMBALTA) 60 mg delayed release capsule Take 1 capsule (60 mg total) by mouth daily 90 capsule 1    lamoTRIgine (LaMICtal) 100 mg tablet Take 0 5 tablets (50 mg total) by mouth daily 45 tablet 1    levonorgestrel (MIRENA) 20 MCG/24HR IUD 1 each by Intrauterine route once      pregabalin (LYRICA) 150 mg capsule TAKE 1 CAPSULE TWICE A  capsule 0    traZODone (DESYREL) 50 mg tablet Take 1-2 tablets ( mg total) by mouth daily at bedtime as needed for sleep 135 tablet 1     No current facility-administered medications for this visit  Allergies:     No Known Allergies   Physical Exam:     /80   Pulse 91   Temp (!) 96 3 °F (35 7 °C)   Ht 5' 4" (1 626 m)   Wt 79 4 kg (175 lb)   SpO2 97%   BMI 30 04 kg/m²     Physical Exam  Vitals reviewed  Constitutional:       Appearance: Normal appearance  HENT:      Head: Normocephalic and atraumatic  Right Ear: Tympanic membrane, ear canal and external ear normal       Left Ear: Tympanic membrane, ear canal and external ear normal    Eyes:      Conjunctiva/sclera: Conjunctivae normal    Cardiovascular:      Rate and Rhythm: Normal rate and regular rhythm  Pulses: Normal pulses  Heart sounds: Normal heart sounds  Pulmonary:      Effort: Pulmonary effort is normal       Breath sounds: Normal breath sounds  Abdominal:      General: Bowel sounds are normal       Palpations: Abdomen is soft  Musculoskeletal:         General: No swelling  Normal range of motion  Cervical back: Neck supple  Skin:     General: Skin is warm and dry  Neurological:      Mental Status: She is alert and oriented to person, place, and time     Psychiatric:         Mood and Affect: Mood normal          Behavior: Behavior normal           Michelle Daughters [facility]

## 2022-09-02 ENCOUNTER — CLINICAL SUPPORT (OUTPATIENT)
Dept: INTERNAL MEDICINE CLINIC | Facility: CLINIC | Age: 32
End: 2022-09-02

## 2022-09-02 DIAGNOSIS — Z11.1 ENCOUNTER FOR PPD SKIN TEST READING: Primary | ICD-10-CM

## 2022-09-02 LAB
INDURATION: 0 MM
TB SKIN TEST: NEGATIVE

## 2022-10-25 NOTE — TELEPHONE ENCOUNTER
Called patient- lmom awaiting a call back- when patient calls back please advise her that she has a previous balance of $282 85 that will need to be brought down to $250 00 in order to ship her medication  Please advise the patient that she must call [facility] and take care of this so I can schedule delivery       [phone]- [phone] Xolair Pregnancy And Lactation Text: This medication is Pregnancy Category B and is considered safe during pregnancy. This medication is excreted in breast milk.

## 2023-01-12 NOTE — TELEPHONE ENCOUNTER
Fidelia is calling to schedule a colonoscopy. We received a referral for an Open Access.    Please return her call when available    Thank you   received a fax from [facility] stating that lamotrigine needs PA   i called pharm and made them aware that this should go through workers comp

## 2023-07-09 NOTE — PROGRESS NOTES
Assessment   1  Exposure to herpes simplex virus (HSV) (V01 79) (Z20 828)   2  Herpes simplex infection (054 9) (B00 9)    Plan   Exposure to herpes simplex virus (HSV)    · (1) HERPES I/II ANTIBODIES, IGG; Status:Active; Requested [id number]:[id number]; Perform:[facility]; Due:2019;Ordered;For:Exposure to herpes simplex virus (HSV); Ordered By:Clare Gaona;   · (1) HERPES I/II ANTIBODIES, IGM; Status:Active; Requested [id number]; Perform:[facility]; Due:2019;Ordered;For:Exposure to herpes simplex virus (HSV); Ordered By:Clare Gaona;  Herpes simplex infection    · ValACYclovir HCl - 500 MG Oral Tablet; TAKE 1 TABLET DAILY   Rx By: Everett Camargo; Dispense: 90 Days ; #:90 Tablet; Refill: 3;For: Herpes simplex infection; ADDISON = N; Verified Transmission to [id number]; Last Updated By: System, [employer]; 2018 10:39:02 AM  Screening for STD (sexually transmitted disease)    · (1) CHLAMYDIA/GC AMPLIFIED DNA, PCR; Source:Vaginal; Status:Active; Requested    RUTH:[id number]; Perform:[facility] In Office Collection; [id number];HCMUXIM; For:Screening for STD (sexually transmitted disease); Ordered By:Guzman Gaonaer;    Discussion/Summary   Discussion Summary:    Lesions healed  herpes titers - if negative, would repeat in 3-6 months  valacyclovir 500mg daily after treatment dose x 10 days  Chief Complaint   Chief Complaint Free Text Note Form: Patient would like to be tested for STDs  She is pretty sure she contracted herpes from boyfriend  Has two blisters on vagina, one on lip  History of Present Illness   HPI: 52-year-old female who is sexually active with her boyfriend was a known history of herpes virus  After recent intercourse, she noticed to lesions on her vulva that were tender  Valacyclovir was prescribed  A lesion on her lip started however was suppressed  All lesions are currently healed   Discussed herpes titers  The patient would like the blood work done  Discussed that negative, would repeat in 3-6 months  Discussed continued suppression with daily valacyclovir, patient would like to take daily  Rx sent  Vulvar Disorder: The patient is being seen for an initial evaluation of a vulvar disorder  Symptoms:  multiple vulvar lumps,-- vulvar swelling,-- vulvar burning,-- vulvar itching,-- vulvar pain-- and-- vulvar skin lesion, but-- no vulvar drainage,-- no vulvar rash-- and-- no vulvar mass  Symptoms are located in the right vulva  Onset was gradual 1 week(s) ago  The symptoms occur constantly  She describes this as moderate in severity-- and-- resolved  Exacerbating factors:  tight clothing  Associated symptoms:  no vaginal discharge,-- no vaginal bleeding-- and-- no pelvic pain  Current treatment includes anti virals  By report, there is good compliance with treatment  Review of Systems   Focused-Female:      Constitutional: not feeling poorly-- and-- not feeling tired  Genitourinary: no pelvic pain,-- no vaginal discharge,-- no dysmenorrhea-- and-- no unexplained vaginal bleeding  Integumentary: skin lesion, but-- as noted in HPI  ROS Reviewed:    ROS reviewed  Active Problems   1  Abdominal pain (789 00) (R10 9)   2  Acute bacterial conjunctivitis of both eyes (372 03) (H10 33)   3  Adalimumab (Humira) long-term use (V58 69) (Z79 899)   4  Ankylosing spondylitis (720 0) (M45 9)   5  History of Birth Control   6  Cervical radiculopathy (723 4) (M54 12)   7  Concussion (850 9) (S06 0X9A)   8  Eating disorder, unspecified (307 50) (F50 9)   9  Encounter to discuss test results (V65 49) (Z71 89)   10  Extensor tendinitis of foot (727 06) (M77 50)   11  Fatigue (780 79) (R53 83)   12  Feeling tired (780 79) (R53 83)   13  Fibromyalgia (729 1) (M79 7)   14  SHIRLEY (generalized anxiety disorder) (300 02) (F41 1)   15  Headache, unspecified headache type (784 0) (R51)   16   Herpes simplex infection [phone]  9) (B00 9)   17  Methotrexate, long term, current use (V58 69) (Z79 899)   18  Microscopic hematuria (599 72) (R31 29)   19  Migraine headache (346 90) (G43 909)   20  Moderate episode of recurrent major depressive disorder (296 32) (F33 1)   21  Myofascial pain (729 1) (M79 1)   22  History of Need for HPV vaccination (V04 89) (Z23)   23  History of Need for prophylactic vaccination and inoculation against influenza (V04 81)      (Z23)   24  Numbness and tingling (782 0) (R20 0,R20 2)   25  Paresthesias (782 0) (R20 2)   26  Psoriasis (696 1) (L40 9)   27  Psoriatic arthropathy (696 0) (L40 50)   28  PTSD (post-traumatic stress disorder) (309 81) (F43 10)   29  Rheumatoid arthritis of multiple sites with negative rheumatoid factor (714 0) (M06 09)   30  Right ankle pain (719 47) (M25 571)   31  Right ovarian cyst (620 2) (N83 201)   32  Screening for STD (sexually transmitted disease) (V74 5) (Z11 3)   33  Self-injurious behavior (300 9) (F48 9)   34  Sinus headache (784 0) (R51)   35  HORTENCIA (stress urinary incontinence, female) (625 6) (N39 3)   36  Vitamin D deficiency (268 9) (E55 9)   37  Well female exam with routine gynecological exam (V72 31) (Z01 419)    Past Medical History   1  History of Contraception management (V25 9) (Z30 9)   2  History of Contraceptive surveillance (V25 40) (Z30 40)   3  History of Encounter for insertion of mirena IUD (V25 11) (Z30 430)   4  History of Encounter for other contraceptive management (V25 8) (Z30 8)   5  History of Exposure to influenza (V01 79) (Z20 828)   6  History of dyspareunia in female (V13 29) (Z87 42)   7  Denied: History of seizure   8  History of IUD check up (V25 42) (Z30 431)   9  History of Lateral epicondylitis, unspecified laterality (726 32) (M77 10)   10  History of Need for HPV vaccination (V04 89) (Z23)   11  History of Need for prophylactic vaccination and inoculation against influenza (V04 81)      (Z23)   12   History of Right ankle sprain (845 00) (S93 401A)   13  History of Spondylitis (720 9) (M41 90)  Active Problems And Past Medical History Reviewed: The active problems and past medical history were reviewed and updated today  Surgical History   1  History of Breast Surgery Reduction Procedure Bilateral   2  History of Jaw Surgery  Surgical History Reviewed: The surgical history was reviewed and updated today  Family History   Mother    1  Family history of osteoporosis (V17 81) (Z82 62)   2  Family history of Fibromyalgia   3  Family history of Rheumatoid Arthritis   4  Family history of Ulcerative Colitis  Father    5  Family history of Adenocarcinoma  Family History    6  Denied: Family history of bipolar disorder   7  Denied: Family history of Crohn's disease   8  Denied: Family history of paranoid schizophrenia   9  Denied: Family history of psoriatic arthritis   10  Denied: Family history of rheumatoid arthritis   11  Denied: Family history of substance abuse   12  Denied: Family history of suicide attempt   15  Denied: Family history of systemic lupus erythematosus   14  Family history of Hypertension (V17 49)   15  Family history of Peptic Ulcer   16  Family history of Reported Family History Of Kidney Disease  Family History Reviewed: The family history was reviewed and updated today  Social History    · Alcohol Use (History)   · History of Birth Control   · History of Currently In School   · Daily Coffee Consumption (1  Cups/Day)   · Employed   · Never smoker   · No drug use   · Self-injurious behavior (300 9) (F48 9)   · Denied: History of Tobacco Use    Current Meds    1  Azelastine HCl - 0 1 % Nasal Solution; USE 1 SPRAY IN EACH NOSTRIL TWICE DAILY; Therapy: 69JJS0774 to (Last Rx:21Nov2017)  Requested for: 21Nov2017 Ordered   2  FLUoxetine HCl - 20 MG Oral Capsule; take 1 capsule daily  After 2 weeks, increase to 2     capsules daily;      Therapy: 07YRW8347 to (Last Rx:31Syz5713)  Requested for: 26EWJ8446 Ordered   3  Lidocaine 5 % External Patch; 3 to affected area  12 hr on/ 12 hr off , max 3 per     day; Therapy: 94XPP3860 to (Evaluate:20Jan2018)  Requested for: 23Utu9023; Last     Rx:20Raq4992 Ordered   4  Lyrica 75 MG Oral Capsule; TAKE 1 CAPSULE IN MORNING (IN ADDITION TO 75MG IN     AM AND 150MG IN PM); Therapy: 66UCO2295 to (Evaluate:27Mar2018); Last Rx:84Jia1433 Ordered   5  Lyrica 75 MG Oral Capsule; TAKE ONE CAPSULE IN MORNING AND TWO CAPSULES IN     EVENING; Therapy: 54LJA9508 to (Evaluate:19Feb2018); Last Rx:21Nov2017 Ordered   6  Mirena (52 MG) 20 MCG/24HR Intrauterine Intrauterine Device; Therapy: (Recorded:10Aug2016) to Recorded   7  Prochlorperazine Maleate 10 MG Oral Tablet; take 1 tablet by mouth every 8 hours as     needed for nausea; Therapy: 64WFW4506 to (Evaluate:18Dec2017)  Requested for: 04HLM8342; Last     Rx:29Kpb3911 Ordered   8  Rizatriptan Benzoate 10 MG Oral Tablet; TAKE 1 TABLET AT ONSET OF HEADACHE  MAY     REPEAT EVERY 2 HOURS AS NEEDED  MAXIMUM 3 TABLETS IN 24 HOURS; Therapy: 07DFH2848 to 0490 69 75 87)  Requested for: ; Last     Rx:27Ubt5685; Status: ACTIVE - Renewal Denied Ordered   9  Topiramate 25 MG Oral Tablet; 2 po  bid; Therapy: 52ZPO7603 to (Evaluate:18Jun2018)  Requested for: 71Kso5023; Last     Rx:50Gin3804 Ordered   10  ValACYclovir HCl - 1 GM Oral Tablet; TAKE 1 TABLET EVERY 12 HOURS DAILY; Therapy: 68DQX6840 to (Evaluate:07Jan2018)  Requested for: 84Bdt0454; Last      Rx:25Hhb1109 Ordered  Medication List Reviewed: The medication list was reviewed and updated today  Allergies   1  No Known Drug Allergies  2   Other    Vitals   Vital Signs    Recorded: 83ZXI9065 10:08AM   Heart Rate 78   Systolic 615, Sitting   Diastolic 82, Sitting   Height 5 ft 4 in   Weight 183 lb    BMI Calculated 31 41   BSA Calculated 1 88   Pain Scale 0     Physical Exam        Constitutional      General appearance: No acute distress, well appearing and well nourished  Genitourinary      External genitalia: Normal and no lesions appreciated  -- no active lesions  one healed lesion on labia near clitoral dumont  Vagina: Normal, no lesions or dryness appreciated         Psychiatric      Orientation to person, place, and time: Normal        Mood and affect: Normal        Future Appointments      Date/Time Provider Specialty Site   01/09/2018 04:00 PM Ankit Duenas [facility] Psychiatry [facility]   02/05/2018 05:00 PM Ankit Duenas [facility] Psychiatry [facility]   02/19/2018 12:00 PM Ankit Duenas [facility] Psychiatry [facility]   02/26/2018 05:00 PM Ankit Duenas [facility] Psychiatry [facility]   03/05/2018 05:00 PM Ankit Duenas [facility] Psychiatry [facility]   01/15/2018 11:15 AM Rico Quinonez, [address] Neurology [facility] [place]   02/12/2018 05:30 PM Everton Clark DO Psychiatry [facility]   01/05/2018 07:00 AM Maryann Ward MD Pain Management [place] [address]    Electronically signed by : Dasia Lopez DO; Jan 2 3845 10:43AM EST                       (Author) No

## 2024-04-22 NOTE — TELEPHONE ENCOUNTER
Called [facility]- spoke with Juliana Castillo- he advised me the patient's Botox order is currently in insurance verification  Order is marked as STAT  Will do a status check in 2 days  Any Hypertriglyceridemia?: No Hypercholesterolemia Monitoring: I explained this is common when taking isotretinoin. We will monitor closely. Female Pregnancy Counseling Text: Female patients should also be on two forms of birth control while taking this medication and for one month after their last dose. Headache Monitoring: I recommended monitoring the headaches for now. There is no evidence of increased intracranial pressure. They were instructed to call if the headaches are worsening. Are Labs Available For Review?: No- Not Drawn Yet Cheilitis Normal Treatment: I recommended application of Vaseline or Aquaphor numerous times a day (as often as every hour) and before going to bed. Hypertriglyceridemia Monitoring: I explained this is common when taking isotretinoin. If this worsens they will contact us. Use Therapeutic Ranged Or Therapeutic Target: please select Range or Target Pounds Preamble Statement (Weight Entered In Details Tab): Reported Weight in pounds: Xerosis Normal Treatment: I recommended application of Cetaphil or CeraVe numerous times a day going to bed to all dry areas. Hypertriglyceridemia Treatment: I explained this is common when taking isotretinoin. If this worsens they will contact us. They may try OTC ibuprofen. Cheilitis Aggressive Treatment: I recommended application of Vaseline or Aquaphor numerous times a day (as often as every hour) and before going to bed. I also prescribed a topical steroid for twice daily use. Patient Weight (Optional But Required For Cumulative Dose-Numbers And Decimals Only): 180 Retinoid Dermatitis Normal Treatment: I recommended more frequent application of Cetaphil or CeraVe to the areas of dermatitis. Kilograms Preamble Statement (Weight Entered In Details Tab): Reported Weight in kilograms: Lower Range (In Mg/Kg): 120 Most Recent Alt (Optional): pending Weight Units: pounds Female Completion Statement: After discussing her treatment course we decided to discontinue isotretinoin therapy at this time. I explained that she would need to continue her birth control methods for at least one month after the last dosage. She should also get a pregnancy test one month after the last dose. She shouldn't donate blood for one month after the last dose. She should call with any new symptoms of depression. Xerosis Aggressive Treatment: I recommended application of Cetaphil or CeraVe numerous times a day going to bed to all dry areas. I also prescribed a topical steroid for twice daily use. Nosebleeds Normal Treatment: I explained this is common when taking isotretinoin. I recommended saline mist in each nostril multiple times a day. If this worsens they will contact us. Retinoid Dermatitis Aggressive Treatment: I recommended more frequent application of Cetaphil or CeraVe to the areas of dermatitis. I also prescribed a topical steroid for twice daily use until the dermatitis resolves. Upper Range (In Mg/Kg): 150 Months Of Therapy Completed: 1 Male Completion Statement: After discussing his treatment course we decided to discontinue isotretinoin therapy at this time. He shouldn't donate blood for one month after the last dose. He should call with any new symptoms of depression. Target Cumulative Dosage (In Mg/Kg): 135 Is Xerosis Present?: Yes - Normal Treatment Add Associated Diagnosis When Managing Medication Side Effects: Yes Next Month's Dosage: Continue Current Dosage Dosing Month 1 (Required For Cumulative Dosing): 30mg BID Counseling Text: I reviewed the side effect in detail. Patient should get monthly blood tests, not donate blood, not drive at night if vision affected, and not share medication. Detail Level: Zone Xerosis Normal Treatment: I recommended application of Cetaphil or CeraVe numerous times a day and before going to bed to all dry areas. What Is The Patient's Gender: Male Xerosis Aggressive Treatment: I recommended application of Cetaphil or CeraVe numerous times a day and before going to bed to all dry areas. I also prescribed a topical steroid for twice daily use.
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
## 2024-12-14 NOTE — ASSESSMENT & PLAN NOTE
Off humira  Symptoms stable  On lyrica  Sees rheumatology
On lyrica and cymbalta 
verbal cues/1 person assist

## 2025-02-27 NOTE — LETTER
4 Eyes Skin Assessment     NAME:  Najma Fitzpatrick  YOB: 1950  MEDICAL RECORD NUMBER:  [id number]    The patient is being assessed for  Admission    I agree that at least one RN has performed a thorough Head to Toe Skin Assessment on the patient. ALL assessment sites listed below have been assessed.      Areas assessed by both nurses:    Head, Face, Ears, Shoulders, Back, Chest, Arms, Elbows, Hands, Sacrum. Buttock, Coccyx, Ischium, and Legs. Feet and Heels        Does the Patient have a Wound? No noted wound(s)       Emmanuel Prevention initiated by RN: No  Wound Care Orders initiated by RN: No    Pressure Injury (Stage 3,4, Unstageable, DTI, NWPT, and Complex wounds) if present, place Wound referral order by RN under : No    New Ostomies, if present place, Ostomy referral order under : No     Nurse 1 eSignature: Electronically signed by Lita Costa RN on 2/26/25 at 7:04 PM EST    **SHARE this note so that the co-signing nurse can place an eSignature**    Nurse 2 eSignature: Electronically signed by Ruthann Feldman RN on 2/26/25 at 7:05 PM EST    To whom it may concern,      [place] , [birth year], us under my professional care for medical treatment  It is medically necessary for her to transition from Trokendi XR to Lamictal  Please contact the office with any additional questions        Sincerely,      Norma Sprague PA-C

## 2025-06-12 NOTE — TELEPHONE ENCOUNTER
Contacted patient to make her aware that the forms she requested through [employer] have been completed but there are spots requiring her signature  She will stop by the office tomorrow at some point to complete this  given to family

## (undated) DEVICE — POOLE SUCTION HANDLE: Brand: CARDINAL HEALTH

## (undated) DEVICE — GAUZE SPONGES,16 PLY: Brand: CURITY

## (undated) DEVICE — TUBING SUCTION 5MM X 12 FT

## (undated) DEVICE — PAD GROUNDING ADULT

## (undated) DEVICE — SUT VICRYL 4-0 SH 27 IN J415H

## (undated) DEVICE — BETHLEHEM UNIVERSAL MINOR GEN: Brand: CARDINAL HEALTH

## (undated) DEVICE — SUT MONOCRYL 5-0 RB-1 27 IN Y213H

## (undated) DEVICE — NEEDLE 25G X 1 1/2

## (undated) DEVICE — SPONGE STICK WITH PVP-I: Brand: KENDALL

## (undated) DEVICE — INTENDED FOR TISSUE SEPARATION, AND OTHER PROCEDURES THAT REQUIRE A SHARP SURGICAL BLADE TO PUNCTURE OR CUT.: Brand: BARD-PARKER SAFETY BLADES SIZE 15, STERILE

## (undated) DEVICE — GLOVE SRG BIOGEL 8

## (undated) DEVICE — STERILE SURGICAL LUBRICANT,  TUBE: Brand: SURGILUBE

## (undated) DEVICE — PLUMEPEN PRO 10FT

## (undated) DEVICE — RETRACTOR RING 14.1 X 14.1 CM DISP

## (undated) DEVICE — STAYS ELASTIC 5MM SHARP HOOK LONE STAR

## (undated) DEVICE — BASIC SINGLE BASIN 2-LF: Brand: MEDLINE INDUSTRIES, INC.